# Patient Record
Sex: FEMALE | Race: WHITE | NOT HISPANIC OR LATINO | Employment: FULL TIME | URBAN - METROPOLITAN AREA
[De-identification: names, ages, dates, MRNs, and addresses within clinical notes are randomized per-mention and may not be internally consistent; named-entity substitution may affect disease eponyms.]

---

## 2017-01-01 PROBLEM — E83.39 HYPOPHOSPHATEMIA: Status: ACTIVE | Noted: 2017-01-01

## 2017-01-01 PROBLEM — E83.42 HYPOMAGNESEMIA: Status: ACTIVE | Noted: 2017-01-01

## 2017-01-01 PROBLEM — E44.0 MALNUTRITION OF MODERATE DEGREE: Status: ACTIVE | Noted: 2017-01-01

## 2017-01-03 ENCOUNTER — ANESTHESIA (OUTPATIENT)
Dept: SURGERY | Facility: HOSPITAL | Age: 37
DRG: 329 | End: 2017-01-03
Payer: COMMERCIAL

## 2017-01-03 ENCOUNTER — ANESTHESIA EVENT (OUTPATIENT)
Dept: SURGERY | Facility: HOSPITAL | Age: 37
DRG: 329 | End: 2017-01-03
Payer: COMMERCIAL

## 2017-01-03 PROBLEM — K55.029 ISCHEMIC NECROSIS OF SMALL BOWEL: Status: ACTIVE | Noted: 2017-01-03

## 2017-01-03 PROBLEM — K56.600 PARTIAL SMALL BOWEL OBSTRUCTION: Status: ACTIVE | Noted: 2017-01-03

## 2017-01-03 PROCEDURE — 25000003 PHARM REV CODE 250: Performed by: SURGERY

## 2017-01-03 PROCEDURE — 63600175 PHARM REV CODE 636 W HCPCS: Performed by: SURGERY

## 2017-01-03 PROCEDURE — P9045 ALBUMIN (HUMAN), 5%, 250 ML: HCPCS | Performed by: NURSE ANESTHETIST, CERTIFIED REGISTERED

## 2017-01-03 PROCEDURE — 63600175 PHARM REV CODE 636 W HCPCS: Performed by: NURSE ANESTHETIST, CERTIFIED REGISTERED

## 2017-01-03 PROCEDURE — 25000003 PHARM REV CODE 250: Performed by: NURSE ANESTHETIST, CERTIFIED REGISTERED

## 2017-01-03 RX ORDER — FENTANYL CITRATE 50 UG/ML
INJECTION, SOLUTION INTRAMUSCULAR; INTRAVENOUS
Status: DISCONTINUED | OUTPATIENT
Start: 2017-01-03 | End: 2017-01-03

## 2017-01-03 RX ORDER — NEOSTIGMINE METHYLSULFATE 1 MG/ML
INJECTION, SOLUTION INTRAVENOUS
Status: DISCONTINUED | OUTPATIENT
Start: 2017-01-03 | End: 2017-01-03

## 2017-01-03 RX ORDER — LIDOCAINE HYDROCHLORIDE 10 MG/ML
INJECTION INFILTRATION; PERINEURAL
Status: DISCONTINUED | OUTPATIENT
Start: 2017-01-03 | End: 2017-01-03

## 2017-01-03 RX ORDER — SODIUM CHLORIDE, SODIUM LACTATE, POTASSIUM CHLORIDE, CALCIUM CHLORIDE 600; 310; 30; 20 MG/100ML; MG/100ML; MG/100ML; MG/100ML
INJECTION, SOLUTION INTRAVENOUS CONTINUOUS PRN
Status: DISCONTINUED | OUTPATIENT
Start: 2017-01-03 | End: 2017-01-03

## 2017-01-03 RX ORDER — ROCURONIUM BROMIDE 10 MG/ML
INJECTION, SOLUTION INTRAVENOUS
Status: DISCONTINUED | OUTPATIENT
Start: 2017-01-03 | End: 2017-01-03

## 2017-01-03 RX ORDER — ONDANSETRON 2 MG/ML
INJECTION INTRAMUSCULAR; INTRAVENOUS
Status: DISCONTINUED | OUTPATIENT
Start: 2017-01-03 | End: 2017-01-03

## 2017-01-03 RX ORDER — GLYCOPYRROLATE 0.2 MG/ML
INJECTION INTRAMUSCULAR; INTRAVENOUS
Status: DISCONTINUED | OUTPATIENT
Start: 2017-01-03 | End: 2017-01-03

## 2017-01-03 RX ORDER — MIDAZOLAM HYDROCHLORIDE 1 MG/ML
INJECTION, SOLUTION INTRAMUSCULAR; INTRAVENOUS
Status: DISCONTINUED | OUTPATIENT
Start: 2017-01-03 | End: 2017-01-03

## 2017-01-03 RX ORDER — PROPOFOL 10 MG/ML
VIAL (ML) INTRAVENOUS
Status: DISCONTINUED | OUTPATIENT
Start: 2017-01-03 | End: 2017-01-03

## 2017-01-03 RX ORDER — SUCCINYLCHOLINE CHLORIDE 20 MG/ML
INJECTION INTRAMUSCULAR; INTRAVENOUS
Status: DISCONTINUED | OUTPATIENT
Start: 2017-01-03 | End: 2017-01-03

## 2017-01-03 RX ORDER — ALBUMIN HUMAN 50 G/1000ML
SOLUTION INTRAVENOUS CONTINUOUS PRN
Status: DISCONTINUED | OUTPATIENT
Start: 2017-01-03 | End: 2017-01-03

## 2017-01-03 RX ADMIN — LIDOCAINE HYDROCHLORIDE 80 MG: 10 INJECTION, SOLUTION INFILTRATION; PERINEURAL at 01:01

## 2017-01-03 RX ADMIN — ROCURONIUM BROMIDE 10 MG: 10 INJECTION, SOLUTION INTRAVENOUS at 03:01

## 2017-01-03 RX ADMIN — METRONIDAZOLE 500 MG: 500 SOLUTION INTRAVENOUS at 02:01

## 2017-01-03 RX ADMIN — MIDAZOLAM HYDROCHLORIDE 2 MG: 1 INJECTION, SOLUTION INTRAMUSCULAR; INTRAVENOUS at 01:01

## 2017-01-03 RX ADMIN — ROCURONIUM BROMIDE 45 MG: 10 INJECTION, SOLUTION INTRAVENOUS at 02:01

## 2017-01-03 RX ADMIN — ALBUMIN (HUMAN): 12.5 SOLUTION INTRAVENOUS at 01:01

## 2017-01-03 RX ADMIN — FENTANYL CITRATE 150 MCG: 50 INJECTION, SOLUTION INTRAMUSCULAR; INTRAVENOUS at 01:01

## 2017-01-03 RX ADMIN — ONDANSETRON 4 MG: 2 INJECTION, SOLUTION INTRAMUSCULAR; INTRAVENOUS at 03:01

## 2017-01-03 RX ADMIN — FENTANYL CITRATE 50 MCG: 50 INJECTION, SOLUTION INTRAMUSCULAR; INTRAVENOUS at 02:01

## 2017-01-03 RX ADMIN — PROPOFOL 100 MG: 10 INJECTION, EMULSION INTRAVENOUS at 01:01

## 2017-01-03 RX ADMIN — SUCCINYLCHOLINE CHLORIDE 150 MG: 20 INJECTION, SOLUTION INTRAMUSCULAR; INTRAVENOUS at 01:01

## 2017-01-03 RX ADMIN — GLYCOPYRROLATE 0.6 MG: 0.2 INJECTION, SOLUTION INTRAMUSCULAR; INTRAVENOUS at 03:01

## 2017-01-03 RX ADMIN — CEFTRIAXONE 2 G: 2 INJECTION, SOLUTION INTRAVENOUS at 02:01

## 2017-01-03 RX ADMIN — FENTANYL CITRATE 50 MCG: 50 INJECTION, SOLUTION INTRAMUSCULAR; INTRAVENOUS at 03:01

## 2017-01-03 RX ADMIN — NEOSTIGMINE METHYLSULFATE 4 MG: 1 INJECTION INTRAVENOUS at 03:01

## 2017-01-03 RX ADMIN — Medication 20 MG: at 02:01

## 2017-01-03 RX ADMIN — SODIUM CHLORIDE, SODIUM LACTATE, POTASSIUM CHLORIDE, AND CALCIUM CHLORIDE: 600; 310; 30; 20 INJECTION, SOLUTION INTRAVENOUS at 01:01

## 2017-01-03 RX ADMIN — ROCURONIUM BROMIDE 5 MG: 10 INJECTION, SOLUTION INTRAVENOUS at 01:01

## 2017-01-03 NOTE — TRANSFER OF CARE
"Anesthesia Transfer of Care Note    Patient: Lia Avila    Procedure(s) Performed: Procedure(s):  LAPAROSCOPY-DIAGNOSTIC  [id number]  RESECTION - SMALL BOWEL    Patient location: PACU    Anesthesia Type: general    Transport from OR: Transported from OR on room air with adequate spontaneous ventilation    Post pain: adequate analgesia    Post assessment: no apparent anesthetic complications    Post vital signs: stable    Level of consciousness: awake    Nausea/Vomiting: no nausea/vomiting    Complications: none          Last vitals:   Visit Vitals    BP (!) 123/56    Pulse (!) 138    Temp 36.7 °C (98.1 °F) (Temporal)    Resp 17    Ht 5' 2" (1.575 m)    Wt 62.1 kg (137 lb)    LMP 12/22/2016    SpO2 100%    Breastfeeding No    BMI 25.06 kg/m2     "

## 2017-01-03 NOTE — ANESTHESIA PREPROCEDURE EVALUATION
01/03/2017  Lia Avila is a 36 y.o., female.    [facility] Anesthesia Evaluation    I have reviewed the Patient Summary Reports.    I have reviewed the Nursing Notes.   I have reviewed the Medications.     Review of Systems  Anesthesia Hx:  No problems with previous Anesthesia  History of prior surgery of interest to airway management or planning: Denies Family Hx of Anesthesia complications.   Denies Personal Hx of Anesthesia complications.   Social:  Alcohol Use, Smoker    Hematology/Oncology:     Oncology Normal     EENT/Dental:EENT/Dental Normal   Cardiovascular:   ECG has been reviewed.    Renal/:  Renal/ Normal     Hepatic/GI:   GERD IBS   Musculoskeletal:  Musculoskeletal Normal    Endocrine:  Endocrine Normal    Psych:   Psychiatric History anxiety depression          Physical Exam   Airway/Jaw/Neck:  Airway Findings: Mouth Opening: Normal Tongue: Normal  General Airway Assessment: Adult  Mallampati: II  TM Distance: Normal, at least 6 cm          Chest/Lungs:  Chest/Lungs Findings: Normal Respiratory Rate     Heart/Vascular:  Heart Findings: Rate: Tachycardia             Anesthesia Plan  Type of Anesthesia, risks & benefits discussed:  Anesthesia Type:  general  Patient's Preference:   Intra-op Monitoring Plan:   Intra-op Monitoring Plan Comments:   Post Op Pain Control Plan:   Post Op Pain Control Plan Comments:   Induction:   IV  Beta Blocker:  Patient is not currently on a Beta-Blocker (No further documentation required).       Informed Consent: Patient understands risks and agrees with Anesthesia plan.  Questions answered. Anesthesia consent signed with patient.  ASA Score: 3     Day of Surgery Review of History & Physical:    H&P update referred to the surgeon.         Ready For Surgery From Anesthesia Perspective.

## 2017-01-03 NOTE — ANESTHESIA POSTPROCEDURE EVALUATION
"Anesthesia Post Evaluation    Patient: Lia Avila    Procedure(s) Performed: Procedure(s):  LAPAROSCOPY-DIAGNOSTIC  [id number]  RESECTION - SMALL BOWEL    Final Anesthesia Type: general  Patient location during evaluation: PACU  Patient participation: Yes- Able to Participate  Level of consciousness: awake and alert  Post-procedure vital signs: reviewed and stable  Pain management: adequate  Airway patency: patent  PONV status at discharge: No PONV  Anesthetic complications: no      Cardiovascular status: hemodynamically stable  Respiratory status: spontaneous ventilation  Hydration status: euvolemic  Follow-up not needed.        Visit Vitals    BP (!) 115/52    Pulse (!) 135    Temp 36.7 °C (98.1 °F) (Temporal)    Resp (!) 35    Ht 5' 2" (1.575 m)    Wt 62.1 kg (137 lb)    LMP 12/22/2016    SpO2 95%    Breastfeeding No    BMI 25.06 kg/m2       Pain/Matt Score: Pain Assessment Performed: Yes (1/3/2017 12:00 PM)  Presence of Pain: non-verbal indicators absent (1/3/2017  1:35 PM)  Pain Rating Prior to Med Admin: 7 (1/3/2017  4:05 PM)  Pain Rating Post Med Admin: 0 (eyes closed) (1/3/2017  4:31 AM)      "

## 2017-01-04 PROBLEM — K56.600 PARTIAL SMALL BOWEL OBSTRUCTION: Status: RESOLVED | Noted: 2017-01-03 | Resolved: 2017-01-04

## 2017-01-07 PROBLEM — E83.42 HYPOMAGNESEMIA: Status: RESOLVED | Noted: 2017-01-01 | Resolved: 2017-01-07

## 2017-01-07 PROBLEM — E83.39 HYPOPHOSPHATEMIA: Status: RESOLVED | Noted: 2017-01-01 | Resolved: 2017-01-07

## 2017-01-08 PROBLEM — R19.7 DIARRHEA, UNSPECIFIED: Status: ACTIVE | Noted: 2017-01-08

## 2017-01-09 PROBLEM — K55.029 ISCHEMIC NECROSIS OF SMALL BOWEL: Status: RESOLVED | Noted: 2017-01-03 | Resolved: 2017-01-09

## 2017-01-10 PROBLEM — R10.13 EPIGASTRIC PAIN: Status: ACTIVE | Noted: 2017-01-10

## 2017-01-11 PROBLEM — Z90.49 S/P SMALL BOWEL RESECTION: Status: ACTIVE | Noted: 2017-01-11

## 2017-01-11 PROBLEM — J90 PLEURAL EFFUSION, LEFT: Status: ACTIVE | Noted: 2017-01-11

## 2017-01-15 PROBLEM — E44.0 PROTEIN-CALORIE MALNUTRITION, MODERATE: Status: ACTIVE | Noted: 2017-01-15

## 2017-01-15 PROBLEM — I82.B12 THROMBOSIS OF LEFT SUBCLAVIAN VEIN: Status: ACTIVE | Noted: 2017-01-15

## 2017-01-16 ENCOUNTER — ANESTHESIA (OUTPATIENT)
Dept: SURGERY | Facility: HOSPITAL | Age: 37
DRG: 329 | End: 2017-01-16
Payer: COMMERCIAL

## 2017-01-16 ENCOUNTER — ANESTHESIA EVENT (OUTPATIENT)
Dept: SURGERY | Facility: HOSPITAL | Age: 37
DRG: 329 | End: 2017-01-16
Payer: COMMERCIAL

## 2017-01-16 PROBLEM — E44.0 MALNUTRITION OF MODERATE DEGREE: Chronic | Status: ACTIVE | Noted: 2017-01-01

## 2017-01-16 PROBLEM — K56.600 PARTIAL SMALL BOWEL OBSTRUCTION: Status: ACTIVE | Noted: 2017-01-16

## 2017-01-16 PROCEDURE — 25000003 PHARM REV CODE 250: Performed by: NURSE ANESTHETIST, CERTIFIED REGISTERED

## 2017-01-16 PROCEDURE — 63600175 PHARM REV CODE 636 W HCPCS: Performed by: NURSE ANESTHETIST, CERTIFIED REGISTERED

## 2017-01-16 RX ORDER — MIDAZOLAM HYDROCHLORIDE 1 MG/ML
INJECTION, SOLUTION INTRAMUSCULAR; INTRAVENOUS
Status: DISCONTINUED | OUTPATIENT
Start: 2017-01-16 | End: 2017-01-16

## 2017-01-16 RX ORDER — PROPOFOL 10 MG/ML
VIAL (ML) INTRAVENOUS
Status: DISCONTINUED | OUTPATIENT
Start: 2017-01-16 | End: 2017-01-16

## 2017-01-16 RX ORDER — FENTANYL CITRATE 50 UG/ML
INJECTION, SOLUTION INTRAMUSCULAR; INTRAVENOUS
Status: DISCONTINUED | OUTPATIENT
Start: 2017-01-16 | End: 2017-01-16

## 2017-01-16 RX ORDER — LIDOCAINE HCL/PF 100 MG/5ML
SYRINGE (ML) INTRAVENOUS
Status: DISCONTINUED | OUTPATIENT
Start: 2017-01-16 | End: 2017-01-16

## 2017-01-16 RX ORDER — ONDANSETRON 2 MG/ML
INJECTION INTRAMUSCULAR; INTRAVENOUS
Status: DISCONTINUED | OUTPATIENT
Start: 2017-01-16 | End: 2017-01-16

## 2017-01-16 RX ORDER — SODIUM CHLORIDE, SODIUM LACTATE, POTASSIUM CHLORIDE, CALCIUM CHLORIDE 600; 310; 30; 20 MG/100ML; MG/100ML; MG/100ML; MG/100ML
INJECTION, SOLUTION INTRAVENOUS CONTINUOUS PRN
Status: DISCONTINUED | OUTPATIENT
Start: 2017-01-16 | End: 2017-01-16

## 2017-01-16 RX ADMIN — PROPOFOL 50 MG: 10 INJECTION, EMULSION INTRAVENOUS at 11:01

## 2017-01-16 RX ADMIN — SODIUM CHLORIDE, SODIUM LACTATE, POTASSIUM CHLORIDE, AND CALCIUM CHLORIDE: 600; 310; 30; 20 INJECTION, SOLUTION INTRAVENOUS at 11:01

## 2017-01-16 RX ADMIN — FENTANYL CITRATE 50 MCG: 50 INJECTION, SOLUTION INTRAMUSCULAR; INTRAVENOUS at 11:01

## 2017-01-16 RX ADMIN — PROPOFOL 30 MG: 10 INJECTION, EMULSION INTRAVENOUS at 11:01

## 2017-01-16 RX ADMIN — PROPOFOL 30 MG: 10 INJECTION, EMULSION INTRAVENOUS at 12:01

## 2017-01-16 RX ADMIN — MIDAZOLAM HYDROCHLORIDE 1 MG: 1 INJECTION, SOLUTION INTRAMUSCULAR; INTRAVENOUS at 11:01

## 2017-01-16 RX ADMIN — LIDOCAINE HYDROCHLORIDE 60 MG: 20 INJECTION, SOLUTION INTRAVENOUS at 11:01

## 2017-01-16 RX ADMIN — ONDANSETRON 4 MG: 2 INJECTION, SOLUTION INTRAMUSCULAR; INTRAVENOUS at 11:01

## 2017-01-16 NOTE — ANESTHESIA RELEASE NOTE
"Anesthesia Release from PACU Note    Patient: Lia Avila    Procedure(s) Performed: Procedure(s) (LRB):  CHEST TUBE PLACEMENT (Left)    Anesthesia type: MAC    Post pain: Adequate analgesia    Post assessment: no apparent anesthetic complications, tolerated procedure well and no evidence of recall    Last Vitals:   Visit Vitals    /67 (BP Location: Right arm, Patient Position: Lying, BP Method: Automatic)    Pulse (!) 111    Temp 37.3 °C (99.2 °F) (Oral)    Resp (!) 22    Ht 5' 2" (1.575 m)    Wt 62.1 kg (137 lb)    LMP 12/22/2016    SpO2 (!) 87%    Breastfeeding No    BMI 25.06 kg/m2       Post vital signs: stable    Level of consciousness: awake and alert     Nausea/Vomiting: no nausea/no vomiting    Complications: none    Airway Patency: patent    Respiratory: unassisted, nasal cannula    Cardiovascular: stable and blood pressure at baseline    Hydration: euvolemic  "

## 2017-01-16 NOTE — TRANSFER OF CARE
"Anesthesia Transfer of Care Note    Patient: Lia Avila    Procedure(s) Performed: Procedure(s) (LRB):  CHEST TUBE PLACEMENT (Left)    Patient location: PACU    Anesthesia Type: MAC    Transport from OR: Transported from OR on 2-3 L/min O2 by NC with adequate spontaneous ventilation    Post pain: adequate analgesia    Post assessment: no apparent anesthetic complications    Post vital signs: stable    Level of consciousness: awake and alert    Nausea/Vomiting: no nausea/vomiting    Complications: none          Last vitals:   Visit Vitals    /67 (BP Location: Right arm, Patient Position: Lying, BP Method: Automatic)    Pulse (!) 111    Temp 37.3 °C (99.2 °F) (Oral)    Resp (!) 22    Ht 5' 2" (1.575 m)    Wt 62.1 kg (137 lb)    LMP 12/22/2016    SpO2 (!) 87%    Breastfeeding No    BMI 25.06 kg/m2     "

## 2017-01-16 NOTE — ANESTHESIA PREPROCEDURE EVALUATION
01/16/2017  Lia Avila is a 36 y.o., female.    [facility] An[facility]hesia Evaluation    I have reviewed the Patient Summary Reports.    I have reviewed the Nursing Notes.   I have reviewed the Medications.     Review of Systems  Anesthesia Hx:  No problems with previous Anesthesia  Denies Family Hx of Anesthesia complications.   Denies Personal Hx of Anesthesia complications.   Social:  Smoker    Hematology/Oncology:     Oncology Normal    -- Anemia:   EENT/Dental:EENT/Dental Normal   Cardiovascular:   ECG has been reviewed.    Pulmonary:   Pleural effusion, left   O2 3lpm sat 87%   Renal/:  Renal/ Normal     Hepatic/GI:   GERD    Musculoskeletal:  Musculoskeletal Normal    Endocrine:  Endocrine Normal    Dermatological:  Skin Normal    Psych:   Psychiatric History anxiety          Physical Exam  General:  Well nourished    Airway/Jaw/Neck:  Airway Findings: Mouth Opening: Normal Tongue: Normal  General Airway Assessment: Adult, Average  Mallampati: II  TM Distance: Normal, at least 6 cm      Dental:  Dental Findings: In tact   Chest/Lungs:  Chest/Lungs Findings: Normal Respiratory Rate     Heart/Vascular:  Heart Findings: Rate: Tachycardia  Rhythm: Regular Rhythm  Sounds: Normal        Mental Status:  Mental Status Findings:  Cooperative, Alert and Oriented         Anesthesia Plan  Type of Anesthesia, risks & benefits discussed:  Anesthesia Type:  MAC, general  Patient's Preference:   Intra-op Monitoring Plan:   Intra-op Monitoring Plan Comments:   Post Op Pain Control Plan:   Post Op Pain Control Plan Comments:   Induction:   IV  Beta Blocker:  Patient is not currently on a Beta-Blocker (No further documentation required).       Informed Consent: Patient understands risks and agrees with Anesthesia plan.  Questions answered. Anesthesia consent signed with patient.  ASA Score: 3     Day of Surgery  Review of History & Physical: I have interviewed and examined the patient. I have reviewed the patient's H&P dated: 1/16/17. There are no significant changes.  H&P update referred to the surgeon.         Ready For Surgery From Anesthesia Perspective.

## 2017-01-17 PROBLEM — J90 PLEURAL EFFUSION, RIGHT: Status: ACTIVE | Noted: 2017-01-17

## 2017-01-17 PROBLEM — D64.9 ANEMIA: Status: ACTIVE | Noted: 2017-01-17

## 2017-01-17 PROBLEM — E87.8 ELECTROLYTE IMBALANCE: Status: ACTIVE | Noted: 2017-01-17

## 2017-01-18 PROBLEM — K56.600 PARTIAL SMALL BOWEL OBSTRUCTION: Status: RESOLVED | Noted: 2017-01-16 | Resolved: 2017-01-18

## 2017-01-18 PROBLEM — R07.81 PLEURITIC CHEST PAIN: Status: ACTIVE | Noted: 2017-01-11

## 2017-01-18 PROBLEM — J18.9 PARAPNEUMONIC EFFUSION: Status: ACTIVE | Noted: 2017-01-17

## 2017-01-18 PROBLEM — N73.9 PELVIC ABSCESS IN FEMALE: Status: ACTIVE | Noted: 2017-01-18

## 2017-01-18 PROBLEM — J91.8 PARAPNEUMONIC EFFUSION: Status: ACTIVE | Noted: 2017-01-17

## 2017-01-21 NOTE — ANESTHESIA POSTPROCEDURE EVALUATION
"Anesthesia Post Evaluation    Patient: Lia Avila    Procedure(s) Performed: Procedure(s) (LRB):  CHEST TUBE PLACEMENT (Left)    Final Anesthesia Type: MAC  Patient location during evaluation: PACU  Patient participation: Yes- Able to Participate  Level of consciousness: awake and alert and oriented  Post-procedure vital signs: reviewed and stable  Pain management: adequate  Airway patency: patent  PONV status at discharge: No PONV  Anesthetic complications: no      Cardiovascular status: hemodynamically stable  Respiratory status: unassisted, nasal cannula and spontaneous ventilation  Hydration status: euvolemic  Follow-up not needed.        Visit Vitals    /77 (BP Location: Right arm, Patient Position: Lying, BP Method: Automatic)    Pulse 97    Temp 37.3 °C (99.2 °F) (Oral)    Resp 20    Ht 5' 2" (1.575 m)    Wt 62.6 kg (138 lb)    LMP 12/22/2016    SpO2 97%    Breastfeeding No    BMI 25.24 kg/m2       Pain/Matt Score: Pain Assessment Performed: Yes (1/21/2017 11:05 AM)  Presence of Pain: complains of pain/discomfort (1/21/2017 11:05 AM)  Pain Rating Prior to Med Admin: 2 (1/21/2017 11:09 AM)  Pain Rating Post Med Admin: 2 (1/21/2017  8:27 AM)      "

## 2017-01-22 PROBLEM — R19.7 DIARRHEA, UNSPECIFIED: Status: RESOLVED | Noted: 2017-01-08 | Resolved: 2017-01-22

## 2017-01-23 ENCOUNTER — TELEPHONE (OUTPATIENT)
Dept: PULMONOLOGY | Facility: CLINIC | Age: 37
End: 2017-01-23

## 2017-01-23 NOTE — TELEPHONE ENCOUNTER
----- Message from Thony Ivy MD sent at 1/23/2017  1:37 PM CST -----  Please let her know   NO Malignant cells    FINAL PATHOLOGIC DIAGNOSIS  Pleural fluid:  Sparsely cellular fluid;  Negative for malignant cells.  [facility]  Diagnosed by: Sam Novoa M.D.  (Electronically Signed: 2017-01-23 10:01:47)

## 2017-02-01 ENCOUNTER — NURSE TRIAGE (OUTPATIENT)
Dept: ADMINISTRATIVE | Facility: CLINIC | Age: 37
End: 2017-02-01

## 2017-02-01 ENCOUNTER — HOSPITAL ENCOUNTER (EMERGENCY)
Facility: HOSPITAL | Age: 37
Discharge: HOME | End: 2017-02-01
Attending: EMERGENCY MEDICINE
Payer: COMMERCIAL

## 2017-02-01 VITALS
DIASTOLIC BLOOD PRESSURE: 70 MMHG | OXYGEN SATURATION: 97 % | RESPIRATION RATE: 28 BRPM | SYSTOLIC BLOOD PRESSURE: 120 MMHG | HEIGHT: 62 IN | WEIGHT: 138 LBS | HEART RATE: 100 BPM | TEMPERATURE: 99 F | BODY MASS INDEX: 25.4 KG/M2

## 2017-02-01 DIAGNOSIS — D64.9 ANEMIA, UNSPECIFIED TYPE: Primary | ICD-10-CM

## 2017-02-01 DIAGNOSIS — J90 PLEURAL EFFUSION ON RIGHT: ICD-10-CM

## 2017-02-01 DIAGNOSIS — N83.201 RIGHT OVARIAN CYST: ICD-10-CM

## 2017-02-01 DIAGNOSIS — R03.0 ELEVATED BLOOD PRESSURE READING: ICD-10-CM

## 2017-02-01 DIAGNOSIS — R07.89 CHEST PRESSURE: ICD-10-CM

## 2017-02-01 LAB
ALBUMIN SERPL BCP-MCNC: 3.6 G/DL
ALP SERPL-CCNC: 78 U/L
ALT SERPL W/O P-5'-P-CCNC: 16 U/L
ANION GAP SERPL CALC-SCNC: 13 MMOL/L
AST SERPL-CCNC: 14 U/L
B-HCG UR QL: NEGATIVE
BASOPHILS # BLD AUTO: 0.05 K/UL
BASOPHILS NFR BLD: 0.6 %
BILIRUB SERPL-MCNC: 0.3 MG/DL
BILIRUB UR QL STRIP: NEGATIVE
BUN SERPL-MCNC: 8 MG/DL
CALCIUM SERPL-MCNC: 9.6 MG/DL
CHLORIDE SERPL-SCNC: 107 MMOL/L
CLARITY UR: CLEAR
CO2 SERPL-SCNC: 21 MMOL/L
COLOR UR: YELLOW
CREAT SERPL-MCNC: 0.7 MG/DL
DIFFERENTIAL METHOD: ABNORMAL
EOSINOPHIL # BLD AUTO: 0.4 K/UL
EOSINOPHIL NFR BLD: 4.5 %
ERYTHROCYTE [DISTWIDTH] IN BLOOD BY AUTOMATED COUNT: 16.2 %
EST. GFR  (AFRICAN AMERICAN): >60 ML/MIN/1.73 M^2
EST. GFR  (NON AFRICAN AMERICAN): >60 ML/MIN/1.73 M^2
GLUCOSE SERPL-MCNC: 95 MG/DL
GLUCOSE UR QL STRIP: NEGATIVE
HCT VFR BLD AUTO: 34 %
HGB BLD-MCNC: 10.7 G/DL
HGB UR QL STRIP: NEGATIVE
KETONES UR QL STRIP: NEGATIVE
LEUKOCYTE ESTERASE UR QL STRIP: NEGATIVE
LIPASE SERPL-CCNC: 35 U/L
LYMPHOCYTES # BLD AUTO: 2.1 K/UL
LYMPHOCYTES NFR BLD: 23.3 %
MCH RBC QN AUTO: 26.8 PG
MCHC RBC AUTO-ENTMCNC: 31.5 %
MCV RBC AUTO: 85 FL
MONOCYTES # BLD AUTO: 0.5 K/UL
MONOCYTES NFR BLD: 5.2 %
NEUTROPHILS # BLD AUTO: 6 K/UL
NEUTROPHILS NFR BLD: 66.4 %
NITRITE UR QL STRIP: NEGATIVE
PH UR STRIP: 7 [PH] (ref 5–8)
PLATELET # BLD AUTO: 404 K/UL
PMV BLD AUTO: 10.2 FL
POTASSIUM SERPL-SCNC: 3.9 MMOL/L
PROT SERPL-MCNC: 7.4 G/DL
PROT UR QL STRIP: NEGATIVE
RBC # BLD AUTO: 4 M/UL
SODIUM SERPL-SCNC: 141 MMOL/L
SP GR UR STRIP: 1.01 (ref 1–1.03)
TROPONIN I SERPL DL<=0.01 NG/ML-MCNC: <0.006 NG/ML
URN SPEC COLLECT METH UR: NORMAL
UROBILINOGEN UR STRIP-ACNC: NEGATIVE EU/DL
WBC # BLD AUTO: 9.04 K/UL

## 2017-02-01 PROCEDURE — 25000003 PHARM REV CODE 250: Performed by: EMERGENCY MEDICINE

## 2017-02-01 PROCEDURE — 84484 ASSAY OF TROPONIN QUANT: CPT

## 2017-02-01 PROCEDURE — 80053 COMPREHEN METABOLIC PANEL: CPT

## 2017-02-01 PROCEDURE — 83690 ASSAY OF LIPASE: CPT

## 2017-02-01 PROCEDURE — 81025 URINE PREGNANCY TEST: CPT

## 2017-02-01 PROCEDURE — 99284 EMERGENCY DEPT VISIT MOD MDM: CPT | Mod: 25

## 2017-02-01 PROCEDURE — 63600175 PHARM REV CODE 636 W HCPCS: Performed by: EMERGENCY MEDICINE

## 2017-02-01 PROCEDURE — 93010 ELECTROCARDIOGRAM REPORT: CPT | Mod: ,,, | Performed by: INTERNAL MEDICINE

## 2017-02-01 PROCEDURE — 85025 COMPLETE CBC W/AUTO DIFF WBC: CPT

## 2017-02-01 PROCEDURE — 96374 THER/PROPH/DIAG INJ IV PUSH: CPT

## 2017-02-01 PROCEDURE — 25500020 PHARM REV CODE 255: Performed by: EMERGENCY MEDICINE

## 2017-02-01 PROCEDURE — 81003 URINALYSIS AUTO W/O SCOPE: CPT

## 2017-02-01 RX ORDER — OXYCODONE AND ACETAMINOPHEN 10; 325 MG/1; MG/1
1 TABLET ORAL
Status: COMPLETED | OUTPATIENT
Start: 2017-02-01 | End: 2017-02-01

## 2017-02-01 RX ORDER — DULOXETIN HYDROCHLORIDE 60 MG/1
120 CAPSULE, DELAYED RELEASE ORAL DAILY
COMMUNITY
End: 2019-07-11

## 2017-02-01 RX ORDER — ONDANSETRON 2 MG/ML
4 INJECTION INTRAMUSCULAR; INTRAVENOUS ONCE
Status: COMPLETED | OUTPATIENT
Start: 2017-02-01 | End: 2017-02-01

## 2017-02-01 RX ADMIN — OXYCODONE HYDROCHLORIDE AND ACETAMINOPHEN 1 TABLET: 10; 325 TABLET ORAL at 08:02

## 2017-02-01 RX ADMIN — IOHEXOL 100 ML: 300 INJECTION, SOLUTION INTRAVENOUS at 06:02

## 2017-02-01 RX ADMIN — ONDANSETRON 4 MG: 2 INJECTION INTRAMUSCULAR; INTRAVENOUS at 04:02

## 2017-02-01 RX ADMIN — IOHEXOL 30 ML: 350 INJECTION, SOLUTION INTRAVENOUS at 04:02

## 2017-02-01 NOTE — ED AVS SNAPSHOT
[facility] - [address] [address]               Lia Avila   2017  2:54 PM   ED    Description:  Female : 1980   Department:  [facility]           Your Care was Coordinated By:     Provider Role From To    Kathi Gorman DO Attending Provider 17 1456 --      Reason for Visit     Chest Pain           Diagnoses this Visit        Comments    Anemia, unspecified type    -  Primary     Chest pressure         Pleural effusion on right         Right ovarian cyst           ED Disposition     None           To Do List           Follow-up Information     Follow up with Thony Ivy MD. Schedule an appointment as soon as possible for a visit in 2 days.    Specialty:  Pulmonary Disease    Why:  Return to the ED for:   Worsening shortness of breath, difficulty breathing, fever, worsening chest pain, or other concerns.    Contact information:    [address]  [facility] [address]  [phone]          Follow up with Louis O. Jeansonne, MD. Schedule an appointment as soon as possible for a visit in 2 days.    Specialty:  General Surgery    Why:  Return to the ED for:   vomiting, abdominal pain, fever, unable to pass gas, weakness, or other concerns.     Contact information:    [address] [place] [address] [address] Jessica CRUZ [address]  [phone]          Follow up with [facility]. Schedule an appointment as soon as possible for a visit in 2 days.    Specialty:  Obstetrics and Gynecology    Why:  Return to the ED for lower pelvic pain and vomiting.    Contact information:    [address] [place] [id number]  [phone]      [facility]     [facility] -  Assistance  Registered nurses in the [facility] provide clinical advisement, health education, appointment booking, and other advisory services.  Call for this free service at [phone].              Medications           Message regarding Medications     Verify the changes and/or additions to your medication regime listed below are the same as discussed with your clinician today.  If any of these changes or additions are incorrect, please notify your healthcare provider.        These medications were administered today        Dose Freq    omnipaque 350 iohexol 30 mL 30 mL IMG once as needed    Sig: Take 30 mLs by mouth ONCE PRN for contrast.    Class: Normal    Route: Oral    ondansetron injection 4 mg 4 mg Once    Sig: Inject 4 mg into the vein once.    Class: Normal    Route: Intravenous    omnipaque 300 iohexol 100 mL 100 mL IMG once as needed    Sig: Inject 100 mLs into the vein ONCE PRN for contrast.    Class: Normal    Route: Intravenous    oxycodone-acetaminophen  mg per tablet 1 tablet 1 tablet ED 1 Time    Sig: Take 1 tablet by mouth ED 1 Time.    Class: Normal    Route: Oral           Verify that the below list of medications is an accurate representation of the medications you are currently taking.  If none reported, the list may be blank. If incorrect, please contact your healthcare provider. Carry this list with you in case of emergency.           Current Medications     amoxicillin-clavulanate 875-125mg (AUGMENTIN) 875-125 mg per tablet Take 1 tablet by mouth 2 (two) times daily.    apixaban 5 mg Tab Take 1 tablet (5 mg total) by mouth 2 (two) times daily.    clindamycin (CLEOCIN) 300 MG capsule Take 1 capsule (300 mg total) by mouth 3 (three) times daily.    DEXTROAMPHETAMINE/AMPHETAMINE (ADDERALL ORAL) Take 20 mg by mouth 2 (two) times daily.     duloxetine (CYMBALTA) 60 MG capsule Take 90 mg by mouth once daily.    lorazepam (ATIVAN) 0.5 MG tablet Take 0.5 mg by mouth every 6 (six) hours as needed.    multivitamin (THERAGRAN) tablet Take 1 tablet by mouth once daily.    omeprazole (PRILOSEC) 20 MG capsule Take 20 mg by mouth once daily.    ondansetron (ZOFRAN) 4 MG tablet Take 8 mg by  "mouth every 8 (eight) hours.    promethazine (PHENERGAN) 25 MG tablet Take 1 tablet (25 mg total) by mouth every 6 (six) hours as needed for Nausea.    quetiapine (SEROQUEL) 100 MG Tab Take 300 mg by mouth every evening.     trazodone (DESYREL) 150 MG tablet Take 150 mg by mouth every evening.    imipramine (TOFRANIL) 50 MG tablet Take 100 mg by mouth every evening.     oxycodone (ROXICODONE) 10 mg Tab immediate release tablet Take 1 tablet (10 mg total) by mouth every 6 (six) hours as needed for Pain.           Clinical Reference Information           Your Vitals Were     BP Pulse Temp Resp Height Weight    122/66 92 98.5 °F (36.9 °C) (Oral) 20 5' 2" (1.575 m) 62.6 kg (138 lb)    Last Period SpO2 BMI          12/22/2016 (Approximate) 97% 25.24 kg/m2        Allergies as of 2/1/2017        Reactions    Sulfa (Sulfonamide Antibiotics)       Immunizations Administered on Date of Encounter - 2/1/2017     None      ED Micro, Lab, POCT     Start Ordered       Status Ordering Provider    02/01/17 1627 02/01/17 1626  Troponin I  Add-on      Completed     02/01/17 1529 02/01/17 1528  CBC auto differential  STAT      Final result     02/01/17 1529 02/01/17 1528  Comprehensive metabolic panel  STAT      Final result     02/01/17 1529 02/01/17 1528  Lipase  STAT      Final result     02/01/17 1529 02/01/17 1528  Pregnancy, urine rapid  STAT      Final result     02/01/17 1529 02/01/17 1528  Urinalysis  STAT      Final result     02/01/17 1529 02/01/17 1529  Troponin I  Once      Final result       ED Imaging Orders     Start Ordered       Status Ordering Provider    02/01/17 1548 02/01/17 1553  CT Abdomen Pelvis With Contrast  1 time imaging      Final result     02/01/17 1547 02/01/17 1553  CTA Chest Non-Coronary (PE Study)  1 time imaging      Final result     02/01/17 1529 02/01/17 1528  X-Ray Chest PA And Lateral  1 time imaging      Final result     02/01/17 1529 02/01/17 1528  X-Ray Abdomen Flat And Erect  1 time imaging  "     Final result         Discharge Instructions       Your blood pressure was elevated in the ED.  You may have high blood pressure.   Keep a log of your blood pressure and follow up with a Primary Care Provider within the next two weeks. Call [phone] for appointment.       Discharge References/Attachments     ANEMIA (ENGLISH)    OVARIAN CYST (ENGLISH)    PLEURAL EFFUSION (ENGLISH)    CHEST PAIN, UNCERTAIN CAUSE (ENGLISH)    ABDOMINAL PAIN, ADULT (ENGLISH)    HYPERTENSION, TO BE CONFIRMED (ENGLISH)      Your Scheduled Appointments     Feb 16, 2017 10:40 AM CST   Established Patient Visit with Louis O. Jeansonne IV, MD [facility]    [address] [place] [id number] [phone]            Feb 22, 2017  9:00 AM CST   Established Patient Visit with MD KRISTA [facility] ([facility])    [address] [place] [id number] [phone]              [facility] Sign-Up     Activating your [facility] account is as easy as 1-2-3!     1) Visit [email or web address], select Sign Up Now, enter this activation code and your date of birth, then select Next.  [email or web address]  Expires: 3/8/2017  1:56 PM      2) Create a username and password to use when you visit [facility] in the future and select a security question in case you lose your password and select Next.    3) Enter your e-mail address and click Sign Up!    Additional Information  If you have questions, please e-mail [email or web address] or call [phone] to talk to our [facility] staff. Remember, [facility] is NOT to be used for urgent needs. For medical emergencies, dial 911.         Smoking Cessation     If you would like to quit smoking:   You may be eligible for free services if you are a [place] resident and started smoking cigarettes before September 1, 1988.  Call the Smoking Cessation Trust (SCT) toll free at (986) 508-9537 or (863) 261-7999.   Call 4-264-XWNS-NOW  if you do not meet the above criteria.             [facility] complies with applicable Federal civil rights laws and does not discriminate on the basis of race, color, national origin, age, disability, or sex.        Language Assistance Services     ATTENTION: Language assistance services are available, free of charge. Please call [phone].      ATENCIÓN: Si habla español, tiene a joseph disposición servicios gratuitos de asistencia lingüística. Llame al [phone].     CHÚ Ý: N?u b?n nói Ti?ng Vi?t, có các d?ch v? h? tr? ngôn ng? mi?n phí dành cho b?n. G?i s? [phone].

## 2017-02-01 NOTE — ED PROVIDER NOTES
"SCRIBE #1 NOTE: I, Star Stovall, am scribing for, and in the presence of, Kathi Gorman DO. I have scribed the entire note.      History      Chief Complaint   Patient presents with    Chest Pain     recent bowel resection, saturday began having chest pressure, no relief       Review of patient's allergies indicates:   Allergen Reactions    Sulfa (sulfonamide antibiotics)         HPI   HPI    2/1/2017, 3:01 PM   History obtained from the patient      History of Present Illness: Lia Avila is a 36 y.o. female patient who presents to the Emergency Department for chest pressure which onset gradually 4 days ago. Pt states sx located to "everything above the belly button". Sx are constant and moderate in severity. Sx worse when lying flat or with deep inspiration. Sx alleviated while sitting upright. Associated sx include nausea, abd bloating/distension and discomfort. Although, she is not currently nauseated. Pt denies any SOB, diaphoresis, palpitations, extremity weakness/numbness, leg pain/swelling, HA, dizziness, cough, constipation, vomiting, diarrhea, fever, dysuria, hematuria, and all other sx at this time. Pt states she was admitted here Dec 28 2017 for bowel resection and stayed until Jan 22 after the stay being prolonged to treat bilateral pleural effusion, blood clot to neck and left subclavian. She states she saw her PCP 2 days ago and was dx with constipation, but states she has been having normal bowel movements, and had a BM today. Pt states she was on reglan 5 years ago, unsure why she was taken off of it. Normal gastric emptying study about 3 years ago. No further complaints or concerns at this time.          Arrival mode: Personal vehicle      PCP: Akanksha West MD       Past Medical History:  Past Medical History   Diagnosis Date    Acid reflux     Aerophagia     Alcoholic      recovering    Anxiety     Depression     Functional gastrointestinal disorder     Irritable bowel " syndrome        Past Surgical History:  Past Surgical History   Procedure Laterality Date    Cholecystectomy      Gastrostomy-jejeunostomy tube change/placement           Family History:  History reviewed. No pertinent family history.    Social History:  Social History     Social History Main Topics    Smoking status: Current Every Day Smoker     Packs/day: 0.25    Smokeless tobacco: Not on file    Alcohol use No      Comment: pt states that she is a recoveirng alcoholic, last drink 7-2-11    Drug use: No    Sexual activity: Not on file       ROS   Review of Systems   Constitutional: Negative for chills, diaphoresis and fever.   HENT: Negative for sore throat.    Respiratory: Negative for cough and shortness of breath.    Cardiovascular: Positive for chest pain. Negative for palpitations and leg swelling.   Gastrointestinal: Positive for abdominal distention and abdominal pain. Negative for blood in stool, constipation, diarrhea, nausea and vomiting.   Genitourinary: Negative for difficulty urinating, dysuria and hematuria.   Musculoskeletal: Negative for back pain.   Skin: Negative for rash.   Neurological: Negative for dizziness, weakness, numbness and headaches.   Hematological: Does not bruise/bleed easily.   All other systems reviewed and are negative.      Physical Exam    Initial Vitals   BP Pulse Resp Temp SpO2   02/01/17 1452 02/01/17 1452 02/01/17 1452 02/01/17 1452 02/01/17 1452   134/59 106 16 98.2 °F (36.8 °C) 96 %      Physical Exam  Nursing Notes and Vital Signs Reviewed.  Constitutional: Patient is in no acute distress. Awake and alert. Well-developed and well-nourished.  Head: Atraumatic. Normocephalic.  Eyes: PERRL. EOM intact. Conjunctivae are not pale. No scleral icterus.  ENT: Mucous membranes are moist. Oropharynx is clear and symmetric.    Neck: Supple. Full ROM. No lymphadenopathy.  Cardiovascular: Mildly tachycardic. Regular rhythm. No murmurs, rubs, or gallops. Distal pulses are 2+  "and symmetric.  Pulmonary/Chest: No respiratory distress. Clear to auscultation bilaterally. No wheezing, rales, or rhonchi.  Abdominal: Soft and non-distended.  There is no tenderness.  No rebound, guarding, or rigidity.  Good bowel sounds.  Peg tube in place.  Genitourinary: No CVA tenderness  Musculoskeletal: Moves all extremities. No obvious deformities. No edema. No calf tenderness.   LUE: has no evident deformity. negative for swelling. negative for tenderness. ROM is normal. Cap refill distally is <2 seconds. Radial pulses are equal and 2+ bilaterally. No motor or sensory deficit.   Skin: Warm and dry.  Neurological:  Alert, awake, and appropriate.  Normal speech.  No acute focal neurological deficits are appreciated.  Psychiatric: Normal affect. Good eye contact. Appropriate in content.    ED Course    Procedures  ED Vital Signs:  Vitals:    02/01/17 1452 02/01/17 1616 02/01/17 1716 02/01/17 1850   BP: (!) 134/59 132/76 124/71 (!) 119/46   Pulse: 106 99 89 101   Resp: 16 (!) 24 18 (!) 22   Temp: 98.2 °F (36.8 °C)   98.5 °F (36.9 °C)   TempSrc: Oral   Oral   SpO2: 96% 99% 99% 100%   Weight: 62.6 kg (138 lb)      Height: 5' 2" (1.575 m)       02/01/17 1901 02/01/17 1916 02/01/17 1931 02/01/17 1935   BP: 118/79 112/68 118/86    Pulse: 92 95 100 105   Resp: (!) 23 (!) 22 (!) 25 19   Temp:       TempSrc:       SpO2: 97% 97% 96% 97%   Weight:       Height:        02/01/17 2001 02/01/17 2031   BP: 122/66 120/70   Pulse: 92 100   Resp: 20 (!) 28   Temp:     TempSrc:     SpO2: 97% 97%   Weight:     Height:         Abnormal Lab Results:  Labs Reviewed   CBC W/ AUTO DIFFERENTIAL - Abnormal; Notable for the following:        Result Value    Hemoglobin 10.7 (*)     Hematocrit 34.0 (*)     MCH 26.8 (*)     MCHC 31.5 (*)     RDW 16.2 (*)     Platelets 404 (*)     All other components within normal limits   COMPREHENSIVE METABOLIC PANEL - Abnormal; Notable for the following:     CO2 21 (*)     All other components within " normal limits   LIPASE   PREGNANCY TEST, URINE RAPID   URINALYSIS   TROPONIN I   TROPONIN I        All Lab Results:  Results for orders placed or performed during the hospital encounter of 02/01/17   CBC auto differential   Result Value Ref Range    WBC 9.04 3.90 - 12.70 K/uL    RBC 4.00 4.00 - 5.40 M/uL    Hemoglobin 10.7 (L) 12.0 - 16.0 g/dL    Hematocrit 34.0 (L) 37.0 - 48.5 %    MCV 85 82 - 98 fL    MCH 26.8 (L) 27.0 - 31.0 pg    MCHC 31.5 (L) 32.0 - 36.0 %    RDW 16.2 (H) 11.5 - 14.5 %    Platelets 404 (H) 150 - 350 K/uL    MPV 10.2 9.2 - 12.9 fL    Gran # 6.0 1.8 - 7.7 K/uL    Lymph # 2.1 1.0 - 4.8 K/uL    Mono # 0.5 0.3 - 1.0 K/uL    Eos # 0.4 0.0 - 0.5 K/uL    Baso # 0.05 0.00 - 0.20 K/uL    Gran% 66.4 38.0 - 73.0 %    Lymph% 23.3 18.0 - 48.0 %    Mono% 5.2 4.0 - 15.0 %    Eosinophil% 4.5 0.0 - 8.0 %    Basophil% 0.6 0.0 - 1.9 %    Differential Method Automated    Comprehensive metabolic panel   Result Value Ref Range    Sodium 141 136 - 145 mmol/L    Potassium 3.9 3.5 - 5.1 mmol/L    Chloride 107 95 - 110 mmol/L    CO2 21 (L) 23 - 29 mmol/L    Glucose 95 70 - 110 mg/dL    BUN, Bld 8 6 - 20 mg/dL    Creatinine 0.7 0.5 - 1.4 mg/dL    Calcium 9.6 8.7 - 10.5 mg/dL    Total Protein 7.4 6.0 - 8.4 g/dL    Albumin 3.6 3.5 - 5.2 g/dL    Total Bilirubin 0.3 0.1 - 1.0 mg/dL    Alkaline Phosphatase 78 55 - 135 U/L    AST 14 10 - 40 U/L    ALT 16 10 - 44 U/L    Anion Gap 13 8 - 16 mmol/L    eGFR if African American >60 >60 mL/min/1.73 m^2    eGFR if non African American >60 >60 mL/min/1.73 m^2   Lipase   Result Value Ref Range    Lipase 35 4 - 60 U/L   Pregnancy, urine rapid   Result Value Ref Range    Preg Test, Ur Negative    Urinalysis   Result Value Ref Range    Specimen UA Urine, Clean Catch     Color, UA Yellow Yellow, Straw, [place]    Appearance, UA Clear Clear    pH, UA 7.0 5.0 - 8.0    Specific Gravity, UA 1.010 1.005 - 1.030    Protein, UA Negative Negative    Glucose, UA Negative Negative    Ketones, UA  Negative Negative    Bilirubin (UA) Negative Negative    Occult Blood UA Negative Negative    Nitrite, UA Negative Negative    Urobilinogen, UA Negative <2.0 EU/dL    Leukocytes, UA Negative Negative   Troponin I   Result Value Ref Range    Troponin I <0.006 0.000 - 0.026 ng/mL     Results for MELISSA PALOMINO (MRN [id number]) as of 2/1/2017 16:35   Ref. Range 1/21/2017 06:23 1/22/2017 08:20 1/22/2017 10:20 2/1/2017 15:50   Hemoglobin Latest Ref Range: 12.0 - 16.0 g/dL 8.9 (L)   10.7 (L)   Hematocrit Latest Ref Range: 37.0 - 48.5 % 28.3 (L)   34.0 (L)       Imaging Results:  Imaging Results         CT Abdomen Pelvis With Contrast (Final result) Result time:  02/01/17 18:37:59    Final result by Flako Cordova MD (02/01/17 18:37:59)    Impression:             1.  Right ovarian cyst measuring 3.3 x 2.6 cm.  2.  Interval decrease in size of the small anterior pelvic fluid collection measuring 2.4 x 0.6 m on today's exam.  Interval decrease in size of the small pelvic cul-de-sac fluid collection measuring 1.4 x 1.0 cm on today's exam.  3.  No bowel obstruction.    All CT scans at this facility use dose modulation, iterative reconstruction and/or weight based dosing when appropriate to reduce radiation dose to as low as reasonably achievable.        Electronically signed by: FLAKO CORDOVA MD  Date:     02/01/17  Time:    18:37     Narrative:    Exam: CT scan of the abdomen and pelvis with contrast    Technique: Axial CT imaging was performed through the abdomen and pelvis with  75cc  of intravenous contrast. Multiplanar reformats were performed and interpreted.    Clinical History: recent bowel recent, had fluid collection RLQ.   Abdominal pain     Comparison: CT abdomen and pelvis, 01/17/2017    Findings:         There is a percutaneous gastrostomy tube in place.     The liver, spleen, kidneys, adrenal glands, and pancreas are normal.   Cholecystectomy clips are noted.  There is no bowel obstruction.     There is no  free fluid or free air or peritoneal inflammatory change.  The focal fluid collection in the anterior pelvis seen on the prior exam is much smaller on this exam measuring 2.4 x 0.6 cm.  Also the focal fluid collection in the pelvic cul-de-sac has decreased in size measuring 1.4 x 1.0 cm on today's exam.    The abdominal aorta is normal in caliber.    There is a right ovarian cyst measuring 3.3 x 2.6 cm. The urinary bladder is unremarkable.    There is grade 2 retrolisthesis of L5 on S1.  Posterior spinal fusion is seen from L4-S2.            CTA Chest Non-Coronary (PE Study) (Final result) Result time:  02/01/17 18:31:04    Final result by Flako Cordova MD (02/01/17 18:31:04)    Impression:        In.  No pulmonary embolism.   2.  Moderate size right pleural effusion and small left pleural effusion with bilateral lower lobe atelectasis as described above.        All CT scans at this facility use dose modulation, iterative reconstruction and/or weight based dosing when appropriate to reduce radiation dose to as low as reasonably achievable.        Electronically signed by: FLAKO CORDOVA MD  Date:     02/01/17  Time:    18:31     Narrative:    Exam: CTA chest with intravenous contrast.    Clinical History: Chest pain.  Chest pressure, had clot in left subclavian.  Rule out pulmonary embolus.    Technique: Axial CTA images performed through the chest after the administration of 100 cc intravenous contrast. 3D MIP images were performed and interpreted.    Findings:        There is no evidence of pulmonary embolus. Pulmonary artery caliber is normal. The heart, great vessels, and mediastinal structures are within normal limits. No thoracic adenopathy.    There is marked compressive atelectasis of the basilar segments of the right lower lobe.  There is subsegmental atelectasis of the left lower lobe.  No pneumothorax. There is a moderate size right pleural effusion.  There is a trace left pleural effusion.    There is a  percutaneous gastrostomy tube in place.    The bones are intact.            X-Ray Abdomen Flat And Erect (Final result) Result time:  02/01/17 17:54:52    Final result by Flako Cordova MD (02/01/17 17:54:52)    Impression:             1. No radiographic evidence of small bowel obstruction or ileus.  2.  Right pleural effusion.      Electronically signed by: FLAKO CORDOVA MD  Date:     02/01/17  Time:    17:54     Narrative:    Exam: Abdomen radiograph, 2 views.    Clinical History:   Abdominal Pain.    Findings:    The bowel gas pattern is within normal limits.   Oral contrast is seen within the right colon and hepatic flexure.  Cholecystectomy clips are noted.  Spinal fusion hardware is noted..   No free air the peritoneum.. There is a right pleural effusion..            X-Ray Chest PA And Lateral (Final result) Result time:  02/01/17 17:56:44    Final result by Flako Cordova MD (02/01/17 17:56:44)    Impression:         Persistent small right pleural effusion.            Electronically signed by: FLAKO CORDOVA MD  Date:     02/01/17  Time:    17:56     Narrative:    Exam: Two-view chest radiograph    Clinical History: .  Chest pain     Comparison: Chest x-ray, 01/22/2017.    Findings:   Right pleural effusion persists, unchanged.  Right basilar atelectasis is noted. The cardiac silhouette is within normal limits. No pneumothorax. The bones are intact.             The EKG was ordered, reviewed, and independently interpreted by the ED provider.  Interpretation time: 3:01 PM  Rate: 98 BPM  Rhythm: normal sinus rhythm  Interpretation: No STEMI.        The Emergency Provider reviewed the vital signs and test results, which are outlined above.    ED Discussion       3:39 PM: Speaking with Dr. Jeansonne (cardiology), who recommends pt have CT with oral contrast.    3:46 PM: Re-evaluated pt. Pt is resting in NAD.  D/w pt all pertinent results and information. Updated pt on plan for CT and consult with Dr. Jeansonne. D/w pt  any concerns expressed at this time. Answered all questions. Pt expresses understanding at this time.       7:56 PM:  Discussed with pt all pertinent ED information and results. Discussed pt dx and plan of tx. Gave pt all f/u and return to the ED instructions.Pt to call to advance her f/u appt with Dr. Ivy and Dr. Jeansonne. All questions and concerns were addressed at this time. Pt expresses understanding of information and instructions, and is comfortable with plan to discharge. Pt is stable for discharge.    I discussed with patient and/or family/caretaker that evaluation in the ED does not suggest any emergent or life threatening medical conditions requiring immediate intervention beyond what was provided in the ED, and I believe patient is safe for discharge.  Regardless, an unremarkable evaluation in the ED does not preclude the development or presence of a serious of life threatening condition. As such, patient was instructed to return immediately for any worsening or change in current symptoms.        I have discussed with patient and/or family/caretaker chest pain precautions, specifically to return for worsening chest pain, shortness of breath, fever, or any concern.  I have low suspicion for cardiopulmonary, vascular, infectious, respiratory, or other emergent medical condition based on my evaluation in the ED.      Pre-hypertension/Hypertension: The pt has been informed that they may have pre-hypertension or hypertension based on a blood pressure reading in the ED. I recommend that the pt call the PCP listed on their discharge instructions or a physician of their choice this week to arrange f/u for further evaluation of possible pre-hypertension or hypertension.     ED Medication(s):  Medications   omnipaque 350 iohexol 30 mL (30 mLs Oral Given 2/1/17 1610)   ondansetron injection 4 mg (4 mg Intravenous Given 2/1/17 1623)   omnipaque 300 iohexol 100 mL (100 mLs Intravenous Given 2/1/17 1800)    oxycodone-acetaminophen  mg per tablet 1 tablet (1 tablet Oral Given 2/1/17 2004)       Discharge Medication List as of 2/1/2017  8:21 PM          Follow-up Information     Follow up with Thony Ivy MD. Schedule an appointment as soon as possible for a visit in 2 days.    Specialty:  Pulmonary Disease    Why:  Return to the ED for:   Worsening shortness of breath, difficulty breathing, fever, worsening chest pain, or other concerns.    Contact information:    [address]  [place] [address] [phone]          Follow up with Louis O. Jeansonne, MD. Schedule an appointment as soon as possible for a visit in 2 days.    Specialty:  General Surgery    Why:  Return to the ED for:   vomiting, abdominal pain, fever, unable to pass gas, weakness, or other concerns.     Contact information:    [address] [place] [address]  [phone]          Follow up with [facility]. Schedule an appointment as soon as possible for a visit in 2 days.    Specialty:  Obstetrics and Gynecology    Why:  Return to the ED for lower pelvic pain and vomiting.    Contact information:    [address] [facility] [id number] [phone]             Medical Decision Making    Medical Decision Making:   History:   Old Medical Records: I decided to obtain old medical records.  Old Records Summarized: records from previous admission(s).       <> Summary of Records: Lia Avila is a 36 year old female with history of IBS, functional GI disorder, s/p PEG tube placement who was admitted with small bowel obstruction, parapneumonic effusion, and left subclavian vein DVT.        SBO: Patient presented to ED with LLQ abdominal pain. CT abdomen consistent with developing SBO. PEG tube placed to suction for decompression without improvement. SBFT consistent with SBO. underwent small bowel resection on 1/03/17 for necrotic bowel. Diet slowly advanced and patient tolerated  well.     Parapneumonic Effusion: Patient continued to have leukocytosis postoperatively. Thoracentesis on 1/11/17 and 1/13/17 with exudative drainage. Patient continued to reaccumulate and required CT placement. Patient was followed by Pulmonology. Interventions also included intravenous antibiotics. CXR showed improvement with medical management. Leukocytosis resolved. Blood and pleural cultures were negative. She will continued with Augmentin and Clindamycin x 14 days. She was asked to follow up with Dr. Ivy in 2 weeks for pleural cytology results.     Left Subclavian Vein DVT- Patient required PICC line placement during hospitalization. She developed swelling in LUE. Ultrasound confirmed Left Subclavian vein DVT and was placed on heparin infusion. She was transitioned to Eliquis and will continued treatment for 6 months.   Clinical Tests:   Lab Tests: Ordered and Reviewed  Radiological Study: Ordered and Reviewed  Medical Tests: Ordered and Reviewed           Scribe Attestation:   Scribe #1: I performed the above scribed service and the documentation accurately describes the services I performed. I attest to the accuracy of the note.    Attending:   Physician Attestation Statement for Scribe #1: I, Kathi Gorman DO, personally performed the services described in this documentation, as scribed by Star Stovall in my presence, and it is both accurate and complete.          Clinical Impression       ICD-10-CM ICD-9-CM   1. Anemia, unspecified type D64.9 285.9   2. Chest pressure R07.89 786.59   3. Pleural effusion on right J90 511.9   4. Right ovarian cyst N83.201 620.2   5. Elevated blood pressure reading R03.0 796.2       Disposition:   Disposition: Discharged  Condition: Stable         Kathi Gorman DO  02/01/17 2126

## 2017-02-01 NOTE — ED NOTES
Unable to initiate IV using sonosite in R arm, spoke with Dr. Gorman about difficulties and states its fine to start IV on L arm at this time

## 2017-02-02 ENCOUNTER — TELEPHONE (OUTPATIENT)
Dept: PULMONOLOGY | Facility: CLINIC | Age: 37
End: 2017-02-02

## 2017-02-02 NOTE — DISCHARGE INSTRUCTIONS
Your blood pressure was elevated in the ED.  You may have high blood pressure.   Keep a log of your blood pressure and follow up with a Primary Care Provider within the next two weeks. Call [phone] for appointment.

## 2017-02-02 NOTE — TELEPHONE ENCOUNTER
----- Message from Timothy Collier sent at 2/2/2017  8:49 AM CST -----  Contact: Pt   Pt is requesting to be seen as soon as possible for a hospital follow up./ Pt has an appt on 2/22, but she is requesting to be seen sooner./ Pt can be reached at [phone] ([email or web address])

## 2017-02-03 ENCOUNTER — OFFICE VISIT (OUTPATIENT)
Dept: PULMONOLOGY | Facility: CLINIC | Age: 37
End: 2017-02-03
Payer: COMMERCIAL

## 2017-02-03 ENCOUNTER — PROCEDURE VISIT (OUTPATIENT)
Dept: PULMONOLOGY | Facility: CLINIC | Age: 37
End: 2017-02-03
Payer: COMMERCIAL

## 2017-02-03 VITALS
DIASTOLIC BLOOD PRESSURE: 78 MMHG | BODY MASS INDEX: 25.4 KG/M2 | WEIGHT: 138 LBS | SYSTOLIC BLOOD PRESSURE: 118 MMHG | HEART RATE: 116 BPM | OXYGEN SATURATION: 98 % | RESPIRATION RATE: 18 BRPM | HEIGHT: 62 IN

## 2017-02-03 DIAGNOSIS — R07.81 PLEURITIC CHEST PAIN: Primary | ICD-10-CM

## 2017-02-03 DIAGNOSIS — I82.B12 THROMBOSIS OF LEFT SUBCLAVIAN VEIN: ICD-10-CM

## 2017-02-03 DIAGNOSIS — J18.9 PARAPNEUMONIC EFFUSION: ICD-10-CM

## 2017-02-03 DIAGNOSIS — R06.02 SOB (SHORTNESS OF BREATH): Primary | ICD-10-CM

## 2017-02-03 DIAGNOSIS — R94.2 RESTRICTIVE VENTILATORY DEFECT: Chronic | ICD-10-CM

## 2017-02-03 DIAGNOSIS — E44.0 MALNUTRITION OF MODERATE DEGREE: Chronic | ICD-10-CM

## 2017-02-03 DIAGNOSIS — Z90.49 S/P SMALL BOWEL RESECTION: Chronic | ICD-10-CM

## 2017-02-03 DIAGNOSIS — K58.8 OTHER IRRITABLE BOWEL SYNDROME: Chronic | ICD-10-CM

## 2017-02-03 DIAGNOSIS — J91.8 PARAPNEUMONIC EFFUSION: ICD-10-CM

## 2017-02-03 PROBLEM — R10.13 EPIGASTRIC PAIN: Status: RESOLVED | Noted: 2017-01-10 | Resolved: 2017-02-03

## 2017-02-03 PROBLEM — E87.8 ELECTROLYTE IMBALANCE: Status: RESOLVED | Noted: 2017-01-17 | Resolved: 2017-02-03

## 2017-02-03 PROBLEM — N73.9 PELVIC ABSCESS IN FEMALE: Status: RESOLVED | Noted: 2017-01-18 | Resolved: 2017-02-03

## 2017-02-03 LAB
POST FEF 25 75: 2.16 L/S (ref 2.5–3.65)
POST FET 100: 7.47 S
POST FEV1 FVC: 93 %
POST FEV1: 1.47 L (ref 2.58–3.12)
POST FIF 50: 2.4 L/S
POST FVC: 1.58 L (ref 3.13–3.78)
POST PEF: 3.02 L/S (ref 5.86–7.46)
PRE FEF 25 75: 1.96 L/S (ref 2.5–3.65)
PRE FET 100: 6.91 S
PRE FEV1 FVC: 92 %
PRE FEV1: 1.46 L (ref 2.58–3.12)
PRE FIF 50: 2.53 L/S
PRE FVC: 1.58 L (ref 3.13–3.78)
PRE PEF: 3.39 L/S (ref 5.86–7.46)
PREDICTED FEV1 FVC: 82.73 % (ref 77.84–87.63)
PREDICTED FEV1: 2.85 L (ref 2.58–3.12)
PREDICTED FVC: 3.45 L (ref 3.13–3.78)
PROVOCATION PROTOCOL: ABNORMAL

## 2017-02-03 PROCEDURE — 90686 IIV4 VACC NO PRSV 0.5 ML IM: CPT | Mod: S$GLB,,, | Performed by: INTERNAL MEDICINE

## 2017-02-03 PROCEDURE — 90471 IMMUNIZATION ADMIN: CPT | Mod: S$GLB,,, | Performed by: INTERNAL MEDICINE

## 2017-02-03 PROCEDURE — 99214 OFFICE O/P EST MOD 30 MIN: CPT | Mod: 25,S$GLB,, | Performed by: INTERNAL MEDICINE

## 2017-02-03 PROCEDURE — 90670 PCV13 VACCINE IM: CPT | Mod: S$GLB,,, | Performed by: INTERNAL MEDICINE

## 2017-02-03 PROCEDURE — 90472 IMMUNIZATION ADMIN EACH ADD: CPT | Mod: S$GLB,,, | Performed by: INTERNAL MEDICINE

## 2017-02-03 PROCEDURE — 99999 PR PBB SHADOW E&M-EST. PATIENT-LVL III: CPT | Mod: PBBFAC,,, | Performed by: INTERNAL MEDICINE

## 2017-02-03 PROCEDURE — 94060 EVALUATION OF WHEEZING: CPT | Mod: S$GLB,,, | Performed by: INTERNAL MEDICINE

## 2017-02-03 RX ORDER — ATORVASTATIN CALCIUM 20 MG/1
20 TABLET, FILM COATED ORAL NIGHTLY
Refills: 1 | COMMUNITY
Start: 2017-01-23

## 2017-02-03 NOTE — PROGRESS NOTES
Subjective:       Lia Avila is a 36 y.o. female    This a follow-up visit.  Patient is accompanied by her mother.    Patient was in the hospital with the acute enteritis.    She had a laparotomy and then subsequently developed bilateral pleural effusions that needed chest tube in the left side and thoracentesis on the right side.  Pleural fluid WBC was elevated 5531.  Patient was treated with Augmentin and clindamycin.  She has made a remarkable recovery.  Her albumin has improved from 1.9 and is now 3.6  She was recently seen in the emergency room on Tuesday she is concerned that fluid had reaccumulated  I reviewed old imaging with patient and her mother  There is only very very trivial pleural fluid.  The pleural space is measured at around less than 2 cm.  This is markedly improved patient is taking ibuprofen more treatment and her symptoms have improved  No fever no cough no wheezing.  Her spirometry today consistent with a restrictive ventilatory defect  We have reviewed the literature for the anticoagulation for her acute upper extremity DVT.  Guidelines last at least a minimum of 3 months of anticoagulation per maximum of 6 months  I reviewed with her the risks of anticoagulation including bleeding  Patient will be advised to use barrier methods of birth control.  Pharmacological birth control would be contraindicated given her DVT  No smoking history  She will get a Prevnar 13 vaccination and influenza vaccination          The following portions of the patient's history were reviewed and updated as appropriate:   She  has a past medical history of Acid reflux; Aerophagia; Alcoholic; Anxiety; Depression; Functional gastrointestinal disorder; and Irritable bowel syndrome.  She  does not have any pertinent problems on file.  She  has a past surgical history that includes Cholecystectomy and Gastrostomy-jejeunostomy tube change/placement.  Her family history is not on file.  She  reports that she  quit smoking about 2 months ago. She smoked 0.25 packs per day. She does not have any smokeless tobacco history on file. She reports that she does not drink alcohol or use illicit drugs.  She has a current medication list which includes the following prescription(s): apixaban, apixaban, duloxetine, lorazepam, multivitamin, omeprazole, ondansetron, quetiapine, trazodone, and atorvastatin.  Current Outpatient Prescriptions on File Prior to Visit   Medication Sig Dispense Refill    apixaban 5 mg Tab Take 1 tablet (5 mg total) by mouth 2 (two) times daily. 30 tablet 5    duloxetine (CYMBALTA) 60 MG capsule Take 90 mg by mouth once daily.      lorazepam (ATIVAN) 0.5 MG tablet Take 0.5 mg by mouth every 6 (six) hours as needed.      multivitamin (THERAGRAN) tablet Take 1 tablet by mouth once daily. 30 tablet 1    omeprazole (PRILOSEC) 20 MG capsule Take 20 mg by mouth once daily.      ondansetron (ZOFRAN) 4 MG tablet Take 8 mg by mouth every 8 (eight) hours.      quetiapine (SEROQUEL) 100 MG Tab Take 300 mg by mouth every evening.       trazodone (DESYREL) 150 MG tablet Take 150 mg by mouth every evening.      [DISCONTINUED] DEXTROAMPHETAMINE/AMPHETAMINE (ADDERALL ORAL) Take 20 mg by mouth 2 (two) times daily.       [DISCONTINUED] imipramine (TOFRANIL) 50 MG tablet Take 100 mg by mouth every evening.       [DISCONTINUED] oxycodone (ROXICODONE) 10 mg Tab immediate release tablet Take 1 tablet (10 mg total) by mouth every 6 (six) hours as needed for Pain. 15 tablet 0    [DISCONTINUED] promethazine (PHENERGAN) 25 MG tablet Take 1 tablet (25 mg total) by mouth every 6 (six) hours as needed for Nausea. 15 tablet 0     No current facility-administered medications on file prior to visit.      She is allergic to sulfa (sulfonamide antibiotics)..    Review of Systems  A comprehensive review of systems was negative except for: Respiratory: positive for pleurisy/chest pain      Objective:        Visit Vitals    BP  "118/78    Pulse (!) 116    Resp 18    Ht 5' 2" (1.575 m)    Wt 62.6 kg (138 lb)    LMP 12/22/2016 (Approximate)    SpO2 98%    BMI 25.24 kg/m2     General appearance: alert, appears stated age, cooperative and no distress  Head: atraumatic  Eyes: negative findings: conjunctivae and sclerae normal  Neck: no adenopathy, no carotid bruit, no JVD, supple, symmetrical, trachea midline and thyroid not enlarged, symmetric, no tenderness/mass/nodules  Lungs: clear to auscultation bilaterally and normal percussion bilaterally  Heart: regular rate and rhythm, S1, S2 normal, no murmur, click, rub or gallop  Abdomen: soft, non-tender; bowel sounds normal; no masses,  no organomegaly and PEG  Extremities: extremities normal, atraumatic, no cyanosis or edema  Pulses: 2+ and symmetric  Skin: Skin color, texture, turgor normal. No rashes or lesions  Lymph nodes: Cervical, supraclavicular, and axillary nodes normal.  Neurologic: Grossly normal    Diagnostic Review  CT of chest was reviewed: effusion on right side  On Ct is only 2 cm in thickness   There is no evidence of pulmonary embolus. Pulmonary artery caliber is normal.   The heart, great vessels, and mediastinal structures are within normal limits. No thoracic adenopathy.    There is marked compressive atelectasis of the basilar segments of the right lower lobe.    There is subsegmental atelectasis of the left lower lobe.  No pneumothorax.   There is a moderate size right pleural effusion.  There is a trace left pleural effusion.*.    Pulmonary function tests: FEV1: 1.46  (51 % predicted), FVC:  1.58 (46 % predicted), FEV1/FVC:  92         Cytology to both  right and left reviewed      Assessment:     Problem List Items Addressed This Visit     Malnutrition of moderate degree (Chronic)     Albumin improved to 3.6         Irritable bowel syndrome (Chronic)     Will follow GI/Surgery         Restrictive ventilatory defect (Chronic)     FEV1 1.46( 51%), FVC 1.58( 46%). " FEV1/FVC 92         Relevant Orders    Pneumococcal conjugate vaccine 13-valent less than 6yo IM (Completed)    X-Ray Chest PA And Lateral    Complete PFT w/ bronchodilator    Thrombosis of left subclavian vein     Guidelines are 3-6 months anticoagulation  Risks of anticoagulation  Prescription for elliquis 6 months         Parapneumonic effusion     SP bilateral thoracentesis and left chest Tube  WBcc was elevated 5531 right side and left side 194   Treated and completed Abx         Relevant Orders    Pneumococcal conjugate vaccine 13-valent less than 6yo IM (Completed)    X-Ray Chest PA And Lateral    Complete PFT w/ bronchodilator    S/P small bowel resection     Follow up with surgery 02/16         Pleuritic chest pain - Primary     Had exudative effusion in hospital  SP chest tube left side  SP thoracentsis right side  Cytology was -ve  NSAID and deep breathing           Relevant Orders    Pneumococcal conjugate vaccine 13-valent less than 6yo IM (Completed)    X-Ray Chest PA And Lateral    Complete PFT w/ bronchodilator           Plan:    Come sooner if concerns  No indication for thoracentesis currently    Return in about 6 weeks (around 3/17/2017) for prevnar and flu vac, cxr, pft.    This note was prepared using voice recognition system and is likely to have sound alike errors that may have been overlooked even after proof reading.  Please call me with any questions    Discussed diagnosis, its evaluation, treatment and usual course. All questions answered.    Thank you for the courtesy of participating in the care of this patient    Thony Ivy MD

## 2017-02-03 NOTE — ASSESSMENT & PLAN NOTE
SP bilateral thoracentesis and left chest Tube  WBcc was elevated 5531 right side and left side 194   Treated and completed Abx

## 2017-02-03 NOTE — ASSESSMENT & PLAN NOTE
Guidelines are 3-6 months anticoagulation  Risks of anticoagulation  Prescription for elliquis 6 months

## 2017-02-03 NOTE — PATIENT INSTRUCTIONS
Duration of anticoagulation -- In patients with primary upper extremity deep vein thrombosis, anticoagulation should be continued for a minimum of three months following the initial thrombotic event, with a longer duration of therapy indicated for those who have had a recurrent event [63,110]. We maintain anticoagulation regardless of whether intervention (thrombolysis, thoracic outlet decompression) was performed

## 2017-02-03 NOTE — ASSESSMENT & PLAN NOTE
Had exudative effusion in hospital  SP chest tube left side  SP thoracentsis right side  Cytology was -ve  NSAID and deep breathing

## 2017-02-06 ENCOUNTER — PATIENT MESSAGE (OUTPATIENT)
Dept: SURGERY | Facility: CLINIC | Age: 37
End: 2017-02-06

## 2017-02-16 ENCOUNTER — OFFICE VISIT (OUTPATIENT)
Dept: SURGERY | Facility: CLINIC | Age: 37
End: 2017-02-16
Payer: COMMERCIAL

## 2017-02-16 VITALS
SYSTOLIC BLOOD PRESSURE: 117 MMHG | DIASTOLIC BLOOD PRESSURE: 80 MMHG | BODY MASS INDEX: 25.08 KG/M2 | WEIGHT: 137.13 LBS | HEART RATE: 97 BPM | TEMPERATURE: 98 F

## 2017-02-16 DIAGNOSIS — Z90.49 S/P SMALL BOWEL RESECTION: Primary | ICD-10-CM

## 2017-02-16 PROCEDURE — 99024 POSTOP FOLLOW-UP VISIT: CPT | Mod: S$GLB,,, | Performed by: SURGERY

## 2017-02-16 PROCEDURE — 99999 PR PBB SHADOW E&M-EST. PATIENT-LVL III: CPT | Mod: PBBFAC,,, | Performed by: SURGERY

## 2017-02-16 NOTE — LETTER
[facility]  [address] [place] [address]  Phone: [phone]  Fax: [phone] February 16, 2017     Patient: Lia Avila   YOB: 1980   Date of Visit: 2/16/2017       To Whom It May Concern:    It is my medical opinion that Lia Avila may return to work on 3/16/17 with no restrictions.    If you have any questions or concerns, please don't hesitate to contact my office.    Sincerely,         Louis O. Jeansonne, MD

## 2017-02-16 NOTE — MR AVS SNAPSHOT
[facility]  [address] [facility] [address]  Phone: [phone]  Fax: [phone]                  Lia Avila   2017 10:40 AM   Office Visit    Description:  Female : 1980   Provider:  Louis O. Jeansonne IV, MD   [facility]           Reason for Visit     Post-op Evaluation           Diagnoses this Visit        Comments    S/P small bowel resection    -  Primary            To Do List           Future Appointments        Provider Department Dept Phone    3/1/2017 10:30 AM [facility] CT1 LIMIT 500 LBS [facility] - [phone]    3/1/2017 11:00 AM [facility] CT1 LIMIT 500 LBS [facility] - [phone]    3/17/2017 2:30 PM [facility] XR2 [facility] [phone]    3/17/2017 2:50 PM PULMONARY LAB, [facility]- Pulmonary Function [facility] [phone]    3/17/2017 3:40 PM Thony Ivy MD [facility] [phone]      Goals (5 Years of Data)     None      [facility]     [facility] Nurse Care Line -  Assistance  Registered nurses in the [facility] provide clinical advisement, health education, appointment booking, and other advisory services.  Call for this free service at [phone].             Medications           Message regarding Medications     Verify the changes and/or additions to your medication regime listed below are the same as discussed with your clinician today.  If any of these changes or additions are incorrect, please notify your healthcare provider.             Verify that the below list of medications is an accurate representation of the medications you are currently taking.  If none reported, the list may be blank. If incorrect, please contact your healthcare provider. Carry this list with you in case of emergency.           Current Medications     apixaban (ELIQUIS) 5 mg Tab Take 5 mg by mouth 2 (two) times daily.    apixaban 5 mg  Tab Take 1 tablet (5 mg total) by mouth 2 (two) times daily.    atorvastatin (LIPITOR) 20 MG tablet     duloxetine (CYMBALTA) 60 MG capsule Take 90 mg by mouth once daily.    lorazepam (ATIVAN) 0.5 MG tablet Take 0.5 mg by mouth every 6 (six) hours as needed.    multivitamin (THERAGRAN) tablet Take 1 tablet by mouth once daily.    omeprazole (PRILOSEC) 20 MG capsule Take 20 mg by mouth once daily.    ondansetron (ZOFRAN) 4 MG tablet Take 8 mg by mouth every 8 (eight) hours.    quetiapine (SEROQUEL) 100 MG Tab Take 300 mg by mouth every evening.     trazodone (DESYREL) 150 MG tablet Take 150 mg by mouth every evening.           Clinical Reference Information           Your Vitals Were     BP Pulse Temp Weight Last Period BMI    117/80 97 98.3 °F (36.8 °C) (Oral) 62.2 kg (137 lb 2 oz) 12/22/2016 (Approximate) 25.08 kg/m2      Blood Pressure          Most Recent Value    BP  117/80      Allergies as of 2/16/2017     Sulfa (Sulfonamide Antibiotics)      Immunizations Administered on Date of Encounter - 2/16/2017     None      Orders Placed During Today's Visit     Future Labs/Procedures Expected by Expires    CT Abdomen Pelvis With Contrast  3/1/2017 2/17/2018    CT Chest With Contrast  3/1/2017 2/16/2018      Language Assistance Services     ATTENTION: Language assistance services are available, free of charge. Please call [phone].      ATENCIÓN: Si habla español, tiene a joseph disposición servicios gratuitos de asistencia lingüística. Llame al [phone].     [facility] Ý: N?u b?n nói Ti?ng Vi?t, có các d?ch v? h? tr? ngôn ng? mi?n phí dành cho b?n. G?i s? [phone].         [facility] complies with applicable Federal civil rights laws and does not discriminate on the basis of race, color, national origin, age, disability, or sex.

## 2017-02-16 NOTE — PROGRESS NOTES
C/o upper abdominal pain and fever    Lia Avila is status post small bowel resection and chest tube.  She is tolerating a regular diet. She c/o upper abdominal pain and fever to 100 every night.    PE: Abdomen is soft, non-tender, non-distended.  Incisions clean, dry, and intact.    A/P:  Repeat CT of chest/abd in about 2 weeks.  Patient to remove PEG tube at home.

## 2017-02-16 NOTE — LETTER
[facility]  [address] [place] [address]  Phone: [phone]  Fax: [phone] February 16, 2017     Patient: Lia Avila   YOB: 1980   Date of Visit: 2/16/2017       To Whom It May Concern:    It is my medical opinion that Lia Avila may return to work on 3/6/17 with no restrictions.    If you have any questions or concerns, please don't hesitate to contact my office.    Sincerely,         Louis O. Jeansonne, MD

## 2017-03-01 ENCOUNTER — HOSPITAL ENCOUNTER (OUTPATIENT)
Dept: RADIOLOGY | Facility: HOSPITAL | Age: 37
Discharge: HOME | End: 2017-03-01
Attending: SURGERY
Payer: COMMERCIAL

## 2017-03-01 DIAGNOSIS — Z90.49 S/P SMALL BOWEL RESECTION: ICD-10-CM

## 2017-03-01 PROCEDURE — 74177 CT ABD & PELVIS W/CONTRAST: CPT | Mod: TC

## 2017-03-01 PROCEDURE — 71260 CT THORAX DX C+: CPT | Mod: TC

## 2017-03-01 PROCEDURE — 25500020 PHARM REV CODE 255: Performed by: SURGERY

## 2017-03-01 RX ADMIN — IOHEXOL 75 ML: 350 INJECTION, SOLUTION INTRAVENOUS at 11:03

## 2017-03-01 RX ADMIN — IOHEXOL 30 ML: 350 INJECTION, SOLUTION INTRAVENOUS at 09:03

## 2017-03-02 ENCOUNTER — PATIENT MESSAGE (OUTPATIENT)
Dept: SURGERY | Facility: CLINIC | Age: 37
End: 2017-03-02

## 2017-03-02 DIAGNOSIS — N73.9 PELVIC ABSCESS IN FEMALE: Primary | ICD-10-CM

## 2017-03-07 ENCOUNTER — PATIENT MESSAGE (OUTPATIENT)
Dept: SURGERY | Facility: CLINIC | Age: 37
End: 2017-03-07

## 2017-03-14 ENCOUNTER — TELEPHONE (OUTPATIENT)
Dept: RADIOLOGY | Facility: HOSPITAL | Age: 37
End: 2017-03-14

## 2017-03-14 ENCOUNTER — PATIENT MESSAGE (OUTPATIENT)
Dept: SURGERY | Facility: CLINIC | Age: 37
End: 2017-03-14

## 2017-03-15 ENCOUNTER — HOSPITAL ENCOUNTER (OUTPATIENT)
Dept: RADIOLOGY | Facility: HOSPITAL | Age: 37
Discharge: HOME | End: 2017-03-15
Attending: SURGERY
Payer: COMMERCIAL

## 2017-03-15 DIAGNOSIS — N73.9 PELVIC ABSCESS IN FEMALE: ICD-10-CM

## 2017-03-15 PROCEDURE — 76830 TRANSVAGINAL US NON-OB: CPT | Mod: 26,,, | Performed by: RADIOLOGY

## 2017-03-15 PROCEDURE — 76856 US EXAM PELVIC COMPLETE: CPT | Mod: 26,,, | Performed by: RADIOLOGY

## 2017-03-15 PROCEDURE — 76856 US EXAM PELVIC COMPLETE: CPT | Mod: TC

## 2017-03-16 ENCOUNTER — PATIENT MESSAGE (OUTPATIENT)
Dept: SURGERY | Facility: CLINIC | Age: 37
End: 2017-03-16

## 2017-03-16 ENCOUNTER — TELEPHONE (OUTPATIENT)
Dept: SURGERY | Facility: CLINIC | Age: 37
End: 2017-03-16

## 2017-03-16 DIAGNOSIS — K65.1 INTRA-ABDOMINAL ABSCESS: Primary | ICD-10-CM

## 2017-03-16 NOTE — TELEPHONE ENCOUNTER
----- Message from Louis O. Jeansonne IV, MD sent at 3/16/2017  9:35 AM CDT -----  Schedule for CT-guided abscess drainage in radiology, MyChart message sent.

## 2017-03-16 NOTE — TELEPHONE ENCOUNTER
No ovarian cyst on ultrasound, so fluid collection seen on CT is likely an abscess.  Will schedule for IR drainage.

## 2017-03-16 NOTE — TELEPHONE ENCOUNTER
CT guided abscess drainage scheduled for Monday, March 27th at 8am, patient notified and voiced understandings.

## 2017-03-17 ENCOUNTER — OFFICE VISIT (OUTPATIENT)
Dept: PULMONOLOGY | Facility: CLINIC | Age: 37
End: 2017-03-17
Payer: COMMERCIAL

## 2017-03-17 ENCOUNTER — PROCEDURE VISIT (OUTPATIENT)
Dept: PULMONOLOGY | Facility: CLINIC | Age: 37
End: 2017-03-17
Payer: COMMERCIAL

## 2017-03-17 ENCOUNTER — HOSPITAL ENCOUNTER (OUTPATIENT)
Dept: RADIOLOGY | Facility: HOSPITAL | Age: 37
Discharge: HOME | End: 2017-03-17
Attending: INTERNAL MEDICINE
Payer: COMMERCIAL

## 2017-03-17 VITALS
DIASTOLIC BLOOD PRESSURE: 66 MMHG | HEART RATE: 77 BPM | WEIGHT: 142 LBS | BODY MASS INDEX: 26.13 KG/M2 | OXYGEN SATURATION: 99 % | HEIGHT: 62 IN | RESPIRATION RATE: 18 BRPM | SYSTOLIC BLOOD PRESSURE: 112 MMHG

## 2017-03-17 DIAGNOSIS — J18.9 PARAPNEUMONIC EFFUSION: ICD-10-CM

## 2017-03-17 DIAGNOSIS — R07.81 PLEURITIC CHEST PAIN: ICD-10-CM

## 2017-03-17 DIAGNOSIS — R94.2 DIFFUSION CAPACITY OF LUNG (DL), DECREASED: Chronic | ICD-10-CM

## 2017-03-17 DIAGNOSIS — J91.8 PARAPNEUMONIC EFFUSION: ICD-10-CM

## 2017-03-17 DIAGNOSIS — R94.2 RESTRICTIVE VENTILATORY DEFECT: Chronic | ICD-10-CM

## 2017-03-17 DIAGNOSIS — I82.B12 THROMBOSIS OF LEFT SUBCLAVIAN VEIN: Chronic | ICD-10-CM

## 2017-03-17 DIAGNOSIS — R94.2 ABNORMAL PFT: Primary | ICD-10-CM

## 2017-03-17 PROBLEM — E44.0 MALNUTRITION OF MODERATE DEGREE: Chronic | Status: RESOLVED | Noted: 2017-01-01 | Resolved: 2017-03-17

## 2017-03-17 LAB
POST FEF 25 75: 3.17 L/S (ref 2.46–3.62)
POST FET 100: 7.92 S
POST FEV1 FVC: 82 %
POST FEV1: 2.54 L (ref 2.56–3.1)
POST FIF 50: 2.46 L/S
POST FVC: 3.08 L (ref 3.12–3.77)
POST PEF: 6.01 L/S (ref 5.85–7.45)
PRE DLCO: 13.21 ML/MMHG/MIN (ref 17.35–25.64)
PRE ERV: 0.59 L
PRE FEF 25 75: 2.15 L/S (ref 2.46–3.62)
PRE FET 100: 8.74 S
PRE FEV1 FVC: 78 %
PRE FEV1: 2.46 L (ref 2.56–3.1)
PRE FIF 50: 4.77 L/S
PRE FRC PL: 2.93 L (ref 2.06–3.01)
PRE FVC: 3.17 L (ref 3.12–3.77)
PRE KROGHS K: 3.29 1/MIN
PRE PEF: 6.58 L/S (ref 5.85–7.45)
PRE RV: 2.08 L (ref 1.14–1.84)
PRE SVC: 3.17 L
PRE TLC: 5.25 L (ref 4.28–5.04)
PREDICTED DLCO: 21.5 ML/MMHG/MIN (ref 17.35–25.64)
PREDICTED FEV1 FVC: 82.52 % (ref 77.62–87.42)
PREDICTED FEV1: 2.83 L (ref 2.56–3.1)
PREDICTED FRC N2: 2.54 L (ref 2.06–3.01)
PREDICTED FRC PL: 2.54 L (ref 2.06–3.01)
PREDICTED FVC: 3.44 L (ref 3.12–3.77)
PREDICTED RV: 1.49 L (ref 1.14–1.84)
PREDICTED SVC: 3.12 L
PREDICTED TLC: 4.66 L (ref 4.28–5.04)
PROVOCATION PROTOCOL: ABNORMAL

## 2017-03-17 PROCEDURE — 94729 DIFFUSING CAPACITY: CPT | Mod: S$GLB,,, | Performed by: INTERNAL MEDICINE

## 2017-03-17 PROCEDURE — 99999 PR PBB SHADOW E&M-EST. PATIENT-LVL III: CPT | Mod: PBBFAC,,, | Performed by: INTERNAL MEDICINE

## 2017-03-17 PROCEDURE — 1160F RVW MEDS BY RX/DR IN RCRD: CPT | Mod: S$GLB,,, | Performed by: INTERNAL MEDICINE

## 2017-03-17 PROCEDURE — 71020 XR CHEST PA AND LATERAL: CPT | Mod: TC,PO

## 2017-03-17 PROCEDURE — 94060 EVALUATION OF WHEEZING: CPT | Mod: 59,S$GLB,, | Performed by: INTERNAL MEDICINE

## 2017-03-17 PROCEDURE — 99214 OFFICE O/P EST MOD 30 MIN: CPT | Mod: 25,S$GLB,, | Performed by: INTERNAL MEDICINE

## 2017-03-17 PROCEDURE — 71020 XR CHEST PA AND LATERAL: CPT | Mod: 26,,, | Performed by: RADIOLOGY

## 2017-03-17 PROCEDURE — 94726 PLETHYSMOGRAPHY LUNG VOLUMES: CPT | Mod: 51,S$GLB,, | Performed by: INTERNAL MEDICINE

## 2017-03-17 RX ORDER — METHYLPHENIDATE HYDROCHLORIDE 10 MG/1
10 TABLET ORAL
COMMUNITY
Start: 2017-02-27 | End: 2018-03-28

## 2017-03-17 NOTE — MR AVS SNAPSHOT
[facility]  [address] [facility] [address]  [place] [address]  Phone: [phone]  Fax: [phone]                  Lia Avila   3/17/2017 3:40 PM   Office Visit    Description:  Female : 1980   Provider:  Thony Ivy MD   Department:  [facility] - Pulmonary Services           Reason for Visit     pleuritic chest pain           Diagnoses this Visit        Comments    Abnormal PFT    -  Primary FEV1 improved from 1.46 L to 2.46 L  FVC improved from 1.58 L to 3.17 L  FEV1 was 2.46 (87% predicted) FVC was 3.17 (92% predicted) FEV1/FEC was 78.  TLC was 5.    Thrombosis of left subclavian vein         Diffusion capacity of lung (dl), decreased                To Do List           Future Appointments        Provider Department Dept Phone    3/27/2017 9:00 AM [facility] CT1 LIMIT 500 LBS [facility] -  [phone]    2017 2:45 PM [facility] XR2 [facility] [phone]    2017 3:00 PM PULMONARY LAB, [facility]- Pulmonary Function Svcs [phone]    2017 3:40 PM Thony Ivy MD [facility] [phone]      Goals (5 Years of Data)     None      Follow-Up and Disposition     Return in about 5 months (around 2017) for cxr, luis.       These Medications        Disp Refills Start End    apixaban (ELIQUIS) 5 mg Tab 60 tablet 4 3/17/2017 2017    Take 1 tablet (5 mg total) by mouth 2 (two) times daily. - Oral    Pharmacy: [facility] [facility] [place] - [id number] Ph #: [phone]         [facility]     [facility] Nurse Care Line -  Assistance  Registered nurses in the [facility] provide clinical advisement, health education, appointment booking, and other advisory services.  Call for this free service at [phone].             Medications           Message regarding Medications     Verify the changes and/or additions to your medication regime listed below are the  "same as discussed with your clinician today.  If any of these changes or additions are incorrect, please notify your healthcare provider.        CHANGE how you are taking these medications     Start Taking Instead of    apixaban (ELIQUIS) 5 mg Tab apixaban (ELIQUIS) 5 mg Tab    Dosage:  Take 1 tablet (5 mg total) by mouth 2 (two) times daily. Dosage:  Take 5 mg by mouth 2 (two) times daily.    Reason for Change:  Reorder            Verify that the below list of medications is an accurate representation of the medications you are currently taking.  If none reported, the list may be blank. If incorrect, please contact your healthcare provider. Carry this list with you in case of emergency.           Current Medications     apixaban (ELIQUIS) 5 mg Tab Take 1 tablet (5 mg total) by mouth 2 (two) times daily.    apixaban 5 mg Tab Take 1 tablet (5 mg total) by mouth 2 (two) times daily.    atorvastatin (LIPITOR) 20 MG tablet     duloxetine (CYMBALTA) 60 MG capsule Take 90 mg by mouth once daily.    lorazepam (ATIVAN) 0.5 MG tablet Take 0.5 mg by mouth every 6 (six) hours as needed.    methylphenidate (RITALIN) 10 MG tablet Take 10 mg by mouth.    multivitamin (THERAGRAN) tablet Take 1 tablet by mouth once daily.    omeprazole (PRILOSEC) 20 MG capsule Take 20 mg by mouth once daily.    ondansetron (ZOFRAN) 4 MG tablet Take 8 mg by mouth every 8 (eight) hours.    quetiapine (SEROQUEL) 100 MG Tab Take 300 mg by mouth every evening.     trazodone (DESYREL) 150 MG tablet Take 150 mg by mouth every evening.           Clinical Reference Information           Your Vitals Were     BP Pulse Resp Height Weight SpO2    112/66 77 18 5' 2" (1.575 m) 64.4 kg (142 lb) 99%    BMI                25.97 kg/m2          Blood Pressure          Most Recent Value    BP  112/66      Allergies as of 3/17/2017     Sulfa (Sulfonamide Antibiotics)      Immunizations Administered on Date of Encounter - 3/17/2017     None      Orders Placed During " Today's Visit     Future Labs/Procedures Expected by Expires    X-Ray Chest PA And Lateral  3/17/2017 3/17/2018    Complete PFT without bronchodilator - Clinic  As directed 3/17/2018      Language Assistance Services     ATTENTION: Language assistance services are available, free of charge. Please call [phone].      ATENCIÓN: Si habla español, tiene a joseph disposición servicios gratuitos de asistencia lingüística. Llame al [phone].     CHÚ Ý: N?u b?n nói Ti?ng Vi?t, có các d?ch v? h? tr? ngôn ng? mi?n phí dành cho b?n. G?i s? [phone].         [facility] complies with applicable Federal civil rights laws and does not discriminate on the basis of race, color, national origin, age, disability, or sex.

## 2017-03-17 NOTE — PROGRESS NOTES
Subjective:      Lia Avila is a 37 y.o. female with subclavian vein thrombosis and parapneumonic effusion.  Symptoms have completely resolved no cough no wheezing no shortness of breath.  She still has some residual neuropathic pain at the site of the chest up  Spirometry shows that FEV1 and FVC of significantly improved  PFTs were reviewed today on a mild impairment in the diffusion capacity  She is taking Eliquis for subclavian vein thrombosis  Refill for prescription was made  I'll see her and of July 2017  Two-view chest x-ray was reviewed which was clear.  Chest CT scan performed 3/1/2017 was reviewed no effusion lungs look clear      The following portions of the patient's history were reviewed and updated as appropriate:   She  has a past medical history of Acid reflux; Aerophagia; Alcoholic; Anxiety; Depression; Functional gastrointestinal disorder; and Irritable bowel syndrome.  She  does not have any pertinent problems on file.  She  has a past surgical history that includes Cholecystectomy and Gastrostomy-jejeunostomy tube change/placement.  Her family history is not on file.  She  reports that she quit smoking about 3 months ago. She smoked 0.25 packs per day. She does not have any smokeless tobacco history on file. She reports that she does not drink alcohol or use illicit drugs.  She has a current medication list which includes the following prescription(s): apixaban, apixaban, atorvastatin, duloxetine, lorazepam, methylphenidate, multivitamin, omeprazole, ondansetron, quetiapine, and trazodone.  Current Outpatient Prescriptions on File Prior to Visit   Medication Sig Dispense Refill    apixaban 5 mg Tab Take 1 tablet (5 mg total) by mouth 2 (two) times daily. 30 tablet 5    atorvastatin (LIPITOR) 20 MG tablet   1    duloxetine (CYMBALTA) 60 MG capsule Take 90 mg by mouth once daily.      lorazepam (ATIVAN) 0.5 MG tablet Take 0.5 mg by mouth every 6 (six) hours as needed.       "multivitamin (THERAGRAN) tablet Take 1 tablet by mouth once daily. 30 tablet 1    omeprazole (PRILOSEC) 20 MG capsule Take 20 mg by mouth once daily.      ondansetron (ZOFRAN) 4 MG tablet Take 8 mg by mouth every 8 (eight) hours.      quetiapine (SEROQUEL) 100 MG Tab Take 300 mg by mouth every evening.       trazodone (DESYREL) 150 MG tablet Take 150 mg by mouth every evening.      [DISCONTINUED] apixaban (ELIQUIS) 5 mg Tab Take 5 mg by mouth 2 (two) times daily.       No current facility-administered medications on file prior to visit.      She is allergic to sulfa (sulfonamide antibiotics)..    Review of Systems  A comprehensive review of systems was negative.       Objective:      /66  Pulse 77  Resp 18  Ht 5' 2" (1.575 m)  Wt 64.4 kg (142 lb)  SpO2 99%  BMI 25.97 kg/m2    General appearance: alert, appears stated age, cooperative and no distress  Head: Normocephalic, without obvious abnormality, atraumatic  Lungs: clear to auscultation bilaterally and normal percussion bilaterally  Heart: regular rate and rhythm, S1, S2 normal, no murmur, click, rub or gallop  Extremities: extremities normal, atraumatic, no cyanosis or edema  Pulses: 2+ and symmetric  Skin: Skin color, texture, turgor normal. No rashes or lesions  Lymph nodes: Cervical, supraclavicular, and axillary nodes normal.         Two-view chest x-ray was reviewed with patient clear lung fields    Chest CT scan performed 3/1/2017 was reviewed with patient no pleural effusion complete resolution of pleural effusions    Spirometry was normal  FEV1 improved from 1.46 L to 2.46 L  FVC improved from 1.58 L to 3.17 L    FEV1 was 2.46 (87% predicted) FVC was 3.17 (92% predicted) FEV1/FEC was 78.    TLC was 5.25 (113% predicted) DLCO was 13.2 (61% predicted)        Assessment:       Problem List Items Addressed This Visit     Thrombosis of left subclavian vein    Relevant Medications    apixaban (ELIQUIS) 5 mg Tab    Abnormal PFT - Primary    " Relevant Orders    X-Ray Chest PA And Lateral    Complete PFT without bronchodilator - Clinic      Other Visit Diagnoses     Diffusion capacity of lung (dl), decreased  (Chronic)  (Chronic)      TLC was 5.25 (113% predicted) DLCO was 13.2 (61% predicted)      Relevant Orders    X-Ray Chest PA And Lateral    Complete PFT without bronchodilator - Clinic        Plan:     Return in about 5 months (around 8/17/2017) for cxr, luis.    This note was prepared using voice recognition system and is likely to have sound alike errors that may have been overlooked even after proof reading.  Please call me with any questions    Discussed diagnosis, its evaluation, treatment and usual course. All questions answered.    Thank you for the courtesy of participating in the care of this patient    Thony Ivy MD

## 2017-03-23 ENCOUNTER — PATIENT MESSAGE (OUTPATIENT)
Dept: SURGERY | Facility: CLINIC | Age: 37
End: 2017-03-23

## 2017-03-23 DIAGNOSIS — K58.8 OTHER IRRITABLE BOWEL SYNDROME: Primary | Chronic | ICD-10-CM

## 2017-03-24 ENCOUNTER — TELEPHONE (OUTPATIENT)
Dept: PULMONOLOGY | Facility: CLINIC | Age: 37
End: 2017-03-24

## 2017-03-27 ENCOUNTER — HOSPITAL ENCOUNTER (OUTPATIENT)
Dept: RADIOLOGY | Facility: HOSPITAL | Age: 37
Discharge: HOME | End: 2017-03-27
Attending: SURGERY
Payer: COMMERCIAL

## 2017-03-28 ENCOUNTER — HOSPITAL ENCOUNTER (OUTPATIENT)
Facility: HOSPITAL | Age: 37
Discharge: HOME | End: 2017-03-28
Attending: INTERNAL MEDICINE | Admitting: INTERNAL MEDICINE
Payer: COMMERCIAL

## 2017-03-28 ENCOUNTER — PATIENT MESSAGE (OUTPATIENT)
Dept: SURGERY | Facility: CLINIC | Age: 37
End: 2017-03-28

## 2017-03-28 ENCOUNTER — ANESTHESIA (OUTPATIENT)
Dept: ENDOSCOPY | Facility: HOSPITAL | Age: 37
End: 2017-03-28
Payer: COMMERCIAL

## 2017-03-28 ENCOUNTER — SURGERY (OUTPATIENT)
Age: 37
End: 2017-03-28

## 2017-03-28 ENCOUNTER — ANESTHESIA EVENT (OUTPATIENT)
Dept: ENDOSCOPY | Facility: HOSPITAL | Age: 37
End: 2017-03-28
Payer: COMMERCIAL

## 2017-03-28 ENCOUNTER — PATIENT MESSAGE (OUTPATIENT)
Dept: GASTROENTEROLOGY | Facility: HOSPITAL | Age: 37
End: 2017-03-28

## 2017-03-28 VITALS
DIASTOLIC BLOOD PRESSURE: 57 MMHG | HEIGHT: 62 IN | BODY MASS INDEX: 25.03 KG/M2 | WEIGHT: 136 LBS | OXYGEN SATURATION: 98 % | RESPIRATION RATE: 20 BRPM | SYSTOLIC BLOOD PRESSURE: 96 MMHG | RESPIRATION RATE: 41 BRPM | HEART RATE: 62 BPM | TEMPERATURE: 98 F

## 2017-03-28 DIAGNOSIS — T85.598D FEEDING TUBE DYSFUNCTION, SUBSEQUENT ENCOUNTER: ICD-10-CM

## 2017-03-28 DIAGNOSIS — K31.84 GASTRIC ATONY: ICD-10-CM

## 2017-03-28 DIAGNOSIS — T85.598A FEEDING TUBE DYSFUNCTION, INITIAL ENCOUNTER: Primary | ICD-10-CM

## 2017-03-28 DIAGNOSIS — K58.8 OTHER IRRITABLE BOWEL SYNDROME: Chronic | ICD-10-CM

## 2017-03-28 DIAGNOSIS — Z90.49 S/P SMALL BOWEL RESECTION: ICD-10-CM

## 2017-03-28 DIAGNOSIS — R10.9 ABDOMINAL PAIN: ICD-10-CM

## 2017-03-28 DIAGNOSIS — F45.8 AEROPHAGIA: Chronic | ICD-10-CM

## 2017-03-28 DIAGNOSIS — F45.8: Chronic | ICD-10-CM

## 2017-03-28 DIAGNOSIS — K31.84 GASTROPARESIS: ICD-10-CM

## 2017-03-28 LAB
B-HCG UR QL: NEGATIVE
CTP QC/QA: YES

## 2017-03-28 PROCEDURE — 88305 TISSUE EXAM BY PATHOLOGIST: CPT | Mod: 26,,, | Performed by: PATHOLOGY

## 2017-03-28 PROCEDURE — 88305 TISSUE EXAM BY PATHOLOGIST: CPT | Performed by: PATHOLOGY

## 2017-03-28 PROCEDURE — 63600175 PHARM REV CODE 636 W HCPCS: Performed by: NURSE ANESTHETIST, CERTIFIED REGISTERED

## 2017-03-28 PROCEDURE — 81025 URINE PREGNANCY TEST: CPT | Performed by: INTERNAL MEDICINE

## 2017-03-28 PROCEDURE — 37000009 HC ANESTHESIA EA ADD 15 MINS: Performed by: INTERNAL MEDICINE

## 2017-03-28 PROCEDURE — 25000003 PHARM REV CODE 250: Performed by: NURSE ANESTHETIST, CERTIFIED REGISTERED

## 2017-03-28 PROCEDURE — 37000008 HC ANESTHESIA 1ST 15 MINUTES: Performed by: INTERNAL MEDICINE

## 2017-03-28 PROCEDURE — 43239 EGD BIOPSY SINGLE/MULTIPLE: CPT | Mod: ,,, | Performed by: INTERNAL MEDICINE

## 2017-03-28 PROCEDURE — 43239 EGD BIOPSY SINGLE/MULTIPLE: CPT | Performed by: INTERNAL MEDICINE

## 2017-03-28 PROCEDURE — 25000003 PHARM REV CODE 250: Performed by: INTERNAL MEDICINE

## 2017-03-28 PROCEDURE — 27201012 HC FORCEPS, HOT/COLD, DISP: Performed by: INTERNAL MEDICINE

## 2017-03-28 RX ORDER — LIDOCAINE HCL/PF 100 MG/5ML
SYRINGE (ML) INTRAVENOUS
Status: DISCONTINUED | OUTPATIENT
Start: 2017-03-28 | End: 2017-03-28

## 2017-03-28 RX ORDER — SODIUM CHLORIDE, SODIUM LACTATE, POTASSIUM CHLORIDE, CALCIUM CHLORIDE 600; 310; 30; 20 MG/100ML; MG/100ML; MG/100ML; MG/100ML
INJECTION, SOLUTION INTRAVENOUS CONTINUOUS PRN
Status: DISCONTINUED | OUTPATIENT
Start: 2017-03-28 | End: 2017-03-28

## 2017-03-28 RX ORDER — PROPOFOL 10 MG/ML
INJECTION, EMULSION INTRAVENOUS
Status: DISCONTINUED | OUTPATIENT
Start: 2017-03-28 | End: 2017-03-28

## 2017-03-28 RX ORDER — SODIUM CHLORIDE, SODIUM LACTATE, POTASSIUM CHLORIDE, CALCIUM CHLORIDE 600; 310; 30; 20 MG/100ML; MG/100ML; MG/100ML; MG/100ML
INJECTION, SOLUTION INTRAVENOUS CONTINUOUS
Status: DISCONTINUED | OUTPATIENT
Start: 2017-03-28 | End: 2017-03-28 | Stop reason: HOSPADM

## 2017-03-28 RX ADMIN — SODIUM CHLORIDE, SODIUM LACTATE, POTASSIUM CHLORIDE, AND CALCIUM CHLORIDE: .6; .31; .03; .02 INJECTION, SOLUTION INTRAVENOUS at 10:03

## 2017-03-28 RX ADMIN — PROPOFOL 130 MG: 10 INJECTION, EMULSION INTRAVENOUS at 10:03

## 2017-03-28 RX ADMIN — LIDOCAINE HYDROCHLORIDE 100 MG: 20 INJECTION, SOLUTION INTRAVENOUS at 10:03

## 2017-03-28 RX ADMIN — PROPOFOL 30 MG: 10 INJECTION, EMULSION INTRAVENOUS at 11:03

## 2017-03-28 RX ADMIN — SODIUM CHLORIDE, SODIUM LACTATE, POTASSIUM CHLORIDE, AND CALCIUM CHLORIDE: 600; 310; 30; 20 INJECTION, SOLUTION INTRAVENOUS at 10:03

## 2017-03-28 NOTE — IP AVS SNAPSHOT
[address] [place] [address]           Patient Discharge Instructions   Our goal is to set you up for success. This packet includes information on your condition, medications, and your home care.  It will help you care for yourself to prevent having to return to the hospital.     Please ask your nurse if you have any questions.      There are many details to remember when preparing to leave the hospital. Here is what you will need to do:    1. Take your medicine. If you are prescribed medications, review your Medication List on the following pages. You may have new medications to  at the pharmacy and others that you'll need to stop taking. Review the instructions for how and when to take your medications. Talk with your doctor or nurses if you are unsure of what to do.     2. Go to your follow-up appointments. Specific follow-up information is listed in the following pages. Your may be contacted by a nurse or clinical provider about future appointments. Be sure we have all of the phone numbers to reach you. Please contact your provider's office if you are unable to make an appointment.     3. Watch for warning signs. Your doctor or nurse will give you detailed warning signs to watch for and when to call for assistance. These instructions may also include educational information about your condition. If you experience any of warning signs to your health, call your doctor.               ** Verify the list of medication(s) below is accurate and up to date. Carry this with you in case of emergency. If your medications have changed, please notify your healthcare provider.             Medication List      CONTINUE taking these medications        Additional Info                      * apixaban 5 mg Tab   Quantity:  30 tablet   Refills:  5   Dose:  5 mg    Instructions:  Take 1 tablet (5 mg total) by mouth 2 (two) times daily.     Begin Date    AM    Noon    PM     Bedtime       * apixaban 5 mg Tab   Commonly known as:  ELIQUIS   Quantity:  60 tablet   Refills:  4   Dose:  5 mg    Instructions:  Take 1 tablet (5 mg total) by mouth 2 (two) times daily.     Begin Date    AM    Noon    PM    Bedtime       atorvastatin 20 MG tablet   Commonly known as:  LIPITOR   Refills:  1      Begin Date    AM    Noon    PM    Bedtime       duloxetine 60 MG capsule   Commonly known as:  CYMBALTA   Refills:  0   Dose:  90 mg    Instructions:  Take 90 mg by mouth once daily.     Begin Date    AM    Noon    PM    Bedtime       lorazepam 0.5 MG tablet   Commonly known as:  ATIVAN   Refills:  0   Dose:  0.5 mg    Instructions:  Take 0.5 mg by mouth every 6 (six) hours as needed.     Begin Date    AM    Noon    PM    Bedtime       methylphenidate 10 MG tablet   Commonly known as:  RITALIN   Refills:  0   Dose:  10 mg    Instructions:  Take 10 mg by mouth.     Begin Date    AM    Noon    PM    Bedtime       quetiapine 100 MG Tab   Commonly known as:  SEROQUEL   Refills:  0   Dose:  300 mg    Instructions:  Take 300 mg by mouth every evening.     Begin Date    AM    Noon    PM    Bedtime       trazodone 150 MG tablet   Commonly known as:  DESYREL   Refills:  0   Dose:  150 mg    Instructions:  Take 150 mg by mouth every evening.     Begin Date    AM    Noon    PM    Bedtime       * Notice:  This list has 2 medication(s) that are the same as other medications prescribed for you. Read the directions carefully, and ask your doctor or other care provider to review them with you.      STOP taking these medications     multivitamin tablet   Commonly known as:  THERAGRAN       omeprazole 20 MG capsule   Commonly known as:  PRILOSEC       ondansetron 4 MG tablet   Commonly known as:  ZOFRAN                  Please bring to all follow up appointments:    1. A copy of your discharge instructions.  2. All medicines you are currently taking in their original bottles.  3. Identification and insurance  card.    Please arrive 15 minutes ahead of scheduled appointment time.    Please call 24 hours in advance if you must reschedule your appointment and/or time.        Your Scheduled Appointments     Jul 14, 2017  2:45 PM CDT   Diagnostic Xray with SUMCHANDRAKANT XR2   [facility] ([facility])    [address]  [facility] [address] [phone]            Jul 14, 2017  3:00 PM CDT   Complete PFT with PULMONARY LAB, [facility] ([facility])    [address] [address] [phone]            Jul 14, 2017  3:40 PM CDT   Established Patient Visit with Thony Ivy MD   [facility] ([facility])    [address] [address] [phone]              Follow-up Information     Follow up with Akanksha West MD.    Specialty:  Internal Medicine    Why:  As needed    Contact information:    [address]  [address]  [facility] [id number] [phone]          Discharge Instructions     Future Orders    Activity as tolerated     Call MD for:  difficulty breathing, headache or visual disturbances     Call MD for:  hives     Call MD for:  persistent dizziness or light-headedness     Call MD for:  persistent nausea and vomiting     Call MD for:  redness, tenderness, or signs of infection (pain, swelling, redness, odor or green/yellow discharge around incision site)     Call MD for:  severe uncontrolled pain     Call MD for:  temperature >100.4     Diet general     Questions:    Total calories:      Fat restriction, if any:      Protein restriction, if any:      Na restriction, if any:      Fluid restriction:      Additional restrictions:      No dressing needed         Discharge Instructions       If you develop fevers, severe abdominal pain, significant bleeding, nausea or vomiting, please contact us or come to our Emergency Department at [facility].  Our  contact number is [phone] available for emergencies or  "any question that you may have.      You may return to normal activities tomorrow.  Written discharge instructions were provided to you today and have them handy since you may not remember our conversation today.       I also recommend that you follow a high fiber diet and take calcium supplements daily as well as to continue your present medications.      If you take Aspirin, please resume taking it at your prior dose today. If we obtained biopsies or removed polyps during your procedure, we will contact you within 5 to 6 days with the results.      Thanks for trusting us with your healthcare needs.      Sincerely,     Pop Schwab M.D.        To rate your experience with Dr. 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      Admission Information     Date & Time Provider Department CSN    3/28/2017 10:16 AM Pop Schwab MD [facility] [id number]      Care Providers     Provider Role Specialty Primary office phone    Pop Schwab MD Attending Provider Gastroenterology [phone]    Pop Schwab MD Surgeon  Gastroenterology [phone]      Your Vitals Were     BP Pulse Temp Resp Height Weight    94/52 66 97.9 °F (36.6 °C) (Oral) 66 5' 2" (1.575 m) 61.7 kg (136 lb)    Last Period SpO2 BMI          03/07/2017 99% 24.87 kg/m2        Recent Lab Values        1/2/2017                           5:05 AM           A1C 4.6           Comment for A1C at  5:05 AM on 1/2/2017:  According to ADA guidelines, hemoglobin A1C <7.0% represents  optimal control in non-pregnant diabetic patients.  Different  metrics may apply to specific populations.   Standards of Medical Care in Diabetes - 2016.  For the purpose of screening for the presence of diabetes:  <5.7%     Consistent with the absence of diabetes  5.7-6.4%  " Consistent with increasing risk for diabetes   (prediabetes)  >or=6.5%  Consistent with diabetes  Currently no consensus exists for use of hemoglobin A1C  for diagnosis of diabetes for children.        Pending Labs     Order Current Status    Specimen to Pathology - Surgery Collected (03/28/17 1104)      Allergies as of 3/28/2017        Reactions    Sulfa (Sulfonamide Antibiotics)       [facility]     [facility] Nurse Care Line - 24/7 Assistance  Unless otherwise directed by your provider, please contact [facility], our nurse care line that is available for 24/7 assistance.     Registered nurses in the [facility] provide clinical advisement, health education, appointment booking, and other advisory services.  Call for this free service at [phone].        Advance Directives     An advance directive is a document which, in the event you are no longer able to make decisions for yourself, tells your healthcare team what kind of treatment you do or do not want to receive, or who you would like to make those decisions for you.  If you do not currently have an advance directive, [facility] encourages you to create one.  For more information call:  [phone]), [phone] ([phone]),  or log on to [email or web address].        Smoking Cessation     If you would like to quit smoking:   You may be eligible for free services if you are a [place] resident and started smoking cigarettes before September 1, 1988.  Call the Smoking Cessation Trust ([facility]) toll free at ([phone]) 279-2647 or ([phone].   Call 4-146-QUIT-NOW if you do not meet the above criteria.            Language Assistance Services     ATTENTION: Language assistance services are available, free of charge. Please call [phone].      ATENCIÓN: Si habla español, tiene a joseph disposición servicios gratuitos de asistencia lingüística. Llame al [phone].     CHÚ Ý: N?u b?n nói Ti?ng Vi?t, có các d?ch  v? h? tr? ngôn ng? mi?n phí dành cho b?n. G?i s? [phone].        [employer] [facility] complies with applicable Federal civil rights laws and does not discriminate on the basis of race, color, national origin, age, disability, or sex.

## 2017-03-28 NOTE — ANESTHESIA POSTPROCEDURE EVALUATION
"Anesthesia Post Evaluation    Patient: Lia Avila    Procedure(s) Performed: Procedure(s) (LRB):  ESOPHAGOGASTRODUODENOSCOPY (EGD) (N/A)    Final Anesthesia Type: MAC  Patient location during evaluation: PACU  Patient participation: Yes- Able to Participate  Level of consciousness: awake and alert and oriented  Post-procedure vital signs: reviewed and stable  Pain management: adequate  Airway patency: patent  PONV status at discharge: No PONV  Anesthetic complications: no      Cardiovascular status: blood pressure returned to baseline  Respiratory status: unassisted, room air and spontaneous ventilation  Hydration status: euvolemic  Follow-up not needed.        Visit Vitals    BP (!) 90/58 (BP Location: Right leg, Patient Position: Lying, BP Method: Automatic)    Pulse 65    Temp 36.6 °C (97.9 °F) (Oral)    Resp (!) 66    Ht 5' 2" (1.575 m)    Wt 61.7 kg (136 lb)    LMP 03/07/2017    SpO2 97%    Breastfeeding No    BMI 24.87 kg/m2       Pain/Matt Score: Pain Assessment Performed: Yes (3/28/2017 11:07 AM)  Presence of Pain: non-verbal indicators absent (3/28/2017 11:07 AM)  Matt Score: 9 (3/28/2017 11:07 AM)      "

## 2017-03-28 NOTE — ANESTHESIA RELEASE NOTE
"Anesthesia Release from PACU Note    Patient: Lia Avila    Procedure(s) Performed: Procedure(s) (LRB):  ESOPHAGOGASTRODUODENOSCOPY (EGD) (N/A)    Anesthesia type: MAC    Post pain: Adequate analgesia    Post assessment: no apparent anesthetic complications, tolerated procedure well and no evidence of recall    Last Vitals:   Visit Vitals    BP (!) 90/58 (BP Location: Right leg, Patient Position: Lying, BP Method: Automatic)    Pulse 65    Temp 36.6 °C (97.9 °F) (Oral)    Resp (!) 66    Ht 5' 2" (1.575 m)    Wt 61.7 kg (136 lb)    LMP 03/07/2017    SpO2 97%    Breastfeeding No    BMI 24.87 kg/m2       Post vital signs: stable     Level of consciousness: awake    Nausea/Vomiting: no nausea/no vomiting    Complications: none    Airway Patency: patent    Respiratory: unassisted, spontaneous ventilation, room air    Cardiovascular: stable and blood pressure at baseline    Hydration: euvolemic  "

## 2017-03-28 NOTE — INTERVAL H&P NOTE
"The patient has been examined and the H&P has been reviewed:    I concur with the findings and no changes have occurred since H&P was written.    Anesthesia/Surgery risks, benefits and alternative options discussed and understood by patient/family.    Past Medical History:   Diagnosis Date    Acid reflux     Aerophagia     Alcoholic     recovering    Anxiety     Depression     Functional gastrointestinal disorder     Irritable bowel syndrome      Past Surgical History:   Procedure Laterality Date    CHOLECYSTECTOMY      GASTROSTOMY-JEJEUNOSTOMY TUBE CHANGE/PLACEMENT       No current facility-administered medications on file prior to encounter.      Current Outpatient Prescriptions on File Prior to Encounter   Medication Sig Dispense Refill    apixaban (ELIQUIS) 5 mg Tab Take 1 tablet (5 mg total) by mouth 2 (two) times daily. 60 tablet 4    atorvastatin (LIPITOR) 20 MG tablet   1    duloxetine (CYMBALTA) 60 MG capsule Take 90 mg by mouth once daily.      lorazepam (ATIVAN) 0.5 MG tablet Take 0.5 mg by mouth every 6 (six) hours as needed.      methylphenidate (RITALIN) 10 MG tablet Take 10 mg by mouth.      ondansetron (ZOFRAN) 4 MG tablet Take 8 mg by mouth every 8 (eight) hours.      quetiapine (SEROQUEL) 100 MG Tab Take 300 mg by mouth every evening.       trazodone (DESYREL) 150 MG tablet Take 150 mg by mouth every evening.      apixaban 5 mg Tab Take 1 tablet (5 mg total) by mouth 2 (two) times daily. 30 tablet 5    multivitamin (THERAGRAN) tablet Take 1 tablet by mouth once daily. 30 tablet 1    omeprazole (PRILOSEC) 20 MG capsule Take 20 mg by mouth once daily.       Review of patient's allergies indicates:   Allergen Reactions    Sulfa (sulfonamide antibiotics)      BP (!) 98/56 (BP Location: Left arm, Patient Position: Lying, BP Method: Automatic)  Pulse 67  Temp 97.9 °F (36.6 °C) (Oral)   Resp 18  Ht 5' 2" (1.575 m)  Wt 61.7 kg (136 lb)  LMP 03/07/2017  SpO2 98%  Breastfeeding? " No  BMI 24.87 kg/m2    General Appearance:  Alert, cooperative, no distress, appears stated age   Head:  Normocephalic, without obvious abnormality, atraumatic   Eyes:  PERRL, conjunctiva/corneas clear, EOM's intact, fundi benign, both eyes   Throat: Lips, mucosa, and tongue normal; teeth and gums normal   Neck: Supple, symmetrical, trachea midline, no adenopathy;  thyroid: not enlarged, symmetric, no tenderness/mass/nodules; no carotid bruit or JVD   Lungs:   Clear to auscultation bilaterally, respirations unlabored   Heart:  Regular rate and rhythm, S1 and S2 normal, no murmur, rub, or gallop   Abdomen:   Soft, non-tender, bowel sounds active all four quadrants,  no masses, no organomegaly   Pelvic: Deferred   Extremities: Extremities normal, atraumatic, no cyanosis or edema   Pulses: 2+ and symmetric   Skin: Skin color, texture, turgor normal, no rashes or lesions   Lymph nodes: Cervical, supraclavicular, and axillary nodes normal   Neurologic: Normal           Active Hospital Problems    Diagnosis  POA    *Irritable bowel syndrome [K58.9]  Yes     Chronic    Abdominal pain [R10.9]  Yes    S/P small bowel resection [Z90.49]  Not Applicable    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

## 2017-03-28 NOTE — TRANSFER OF CARE
"Anesthesia Transfer of Care Note    Patient: Lia Avila    Procedure(s) Performed: Procedure(s) (LRB):  ESOPHAGOGASTRODUODENOSCOPY (EGD) (N/A)    Patient location: PACU    Anesthesia Type: MAC    Transport from OR: Transported from OR on room air with adequate spontaneous ventilation    Post pain: adequate analgesia    Post assessment: no apparent anesthetic complications    Post vital signs: stable    Level of consciousness: sedated    Nausea/Vomiting: no nausea/vomiting    Complications: none          Last vitals:   Visit Vitals    BP (!) 90/58 (BP Location: Right leg, Patient Position: Lying, BP Method: Automatic)    Pulse 65    Temp 36.6 °C (97.9 °F) (Oral)    Resp (!) 66    Ht 5' 2" (1.575 m)    Wt 61.7 kg (136 lb)    LMP 03/07/2017    SpO2 97%    Breastfeeding No    BMI 24.87 kg/m2     "

## 2017-03-28 NOTE — ANESTHESIA PREPROCEDURE EVALUATION
03/28/2017  Lia Avila is a 37 y.o., female.    [facility] Anesthesia Evaluation    I have reviewed the Patient Summary Reports.    I have reviewed the Nursing Notes.   I have reviewed the Medications.     Review of Systems  Anesthesia Hx:  No problems with previous Anesthesia    Social:  No Alcohol Use, Former Smoker    Hematology/Oncology:  Hematology Normal   Oncology Normal     EENT/Dental:EENT/Dental Normal   Cardiovascular:   Exercise tolerance: good    Pulmonary:   Snore   Renal/:  Renal/ Normal     Hepatic/GI:   GERD, well controlled 2000 last drink of fluid.   Musculoskeletal:   Arthritis     Neurological:  Neurology Normal    Endocrine:  Endocrine Normal    Dermatological:  Skin Normal    Psych:   Psychiatric History          Physical Exam  General:  Well nourished                 Anesthesia Plan  Type of Anesthesia, risks & benefits discussed:  Anesthesia Type:  MAC  Patient's Preference:   Intra-op Monitoring Plan:   Intra-op Monitoring Plan Comments:   Post Op Pain Control Plan:   Post Op Pain Control Plan Comments:   Induction:   IV  Beta Blocker:  Patient is not currently on a Beta-Blocker (No further documentation required).       Informed Consent: Patient understands risks and agrees with Anesthesia plan.  Questions answered. Anesthesia consent signed with patient.  ASA Score: 2     Day of Surgery Review of History & Physical: I have interviewed and examined the patient. I have reviewed the patient's H&P dated: 03/28/17. There are no significant changes.  H&P update referred to the surgeon.         Ready For Surgery From Anesthesia Perspective.

## 2017-03-28 NOTE — DISCHARGE SUMMARY
Endoscopy Discharge Summary      Admit Date: 3/28/2017    Discharge Date and Time:  3/28/2017 11:19 AM    Attending Physician: Pop Schwab MD     Discharge Physician: Pop Schwab MD     Principal Admitting Diagnoses: Irritable bowel syndrome         Discharge Diagnosis: The primary encounter diagnosis was Feeding tube dysfunction, initial encounter. Diagnoses of Other irritable bowel syndrome, S/P small bowel resection, Digestive disorder due to psychological factors, Gastric atony, Aerophagia, Abdominal pain, Feeding tube dysfunction, subsequent encounter, and Gastroparesis were also pertinent to this visit.     Discharged Condition: Good    Indication for Admission: Irritable bowel syndrome     Hospital Course: Patient was admitted for an inpatient procedure and tolerated the procedure well with no complications.    Significant Diagnostic Studies: EGD with biopsies.     Pathology (if any):  Specimen (12h ago through future)    Start     Ordered    03/28/17 1102  Specimen to Pathology - Surgery  Once     Comments:  1. Small bowel biopsy  1. Antrum biopsy r/o h.pylori and celiac disease    03/28/17 1104          Disposition: Home.    Bleeding: None    No Implants    Follow Up/Patient Instructions:   Current Discharge Medication List      CONTINUE these medications which have NOT CHANGED    Details   !! apixaban (ELIQUIS) 5 mg Tab Take 1 tablet (5 mg total) by mouth 2 (two) times daily.  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      lorazepam (ATIVAN) 0.5 MG tablet Take 0.5 mg by mouth every 6 (six) hours as needed.      methylphenidate (RITALIN) 10 MG tablet Take 10 mg by mouth.      quetiapine (SEROQUEL) 100 MG Tab Take 300 mg by mouth every evening.       trazodone (DESYREL) 150 MG tablet Take 150 mg by mouth every evening.      !!  apixaban 5 mg Tab Take 1 tablet (5 mg total) by mouth 2 (two) times daily.  Qty: 30 tablet, Refills: 5       !! - Potential duplicate medications found. Please discuss with provider.      STOP taking these medications       ondansetron (ZOFRAN) 4 MG tablet Comments:   Reason for Stopping:         multivitamin (THERAGRAN) tablet Comments:   Reason for Stopping:         omeprazole (PRILOSEC) 20 MG capsule Comments:   Reason for Stopping:                 Discharge Procedure Orders  Diet general     Activity as tolerated     Call MD for:  temperature >100.4     Call MD for:  persistent nausea and vomiting     Call MD for:  severe uncontrolled pain     Call MD for:  difficulty breathing, headache or visual disturbances     Call MD for:  redness, tenderness, or signs of infection (pain, swelling, redness, odor or green/yellow discharge around incision site)     Call MD for:  hives     Call MD for:  persistent dizziness or light-headedness     No dressing needed         Follow-up Information     Follow up with Akanksha West MD.    Specialty:  Internal Medicine    Why:  As needed    Contact information:    [address]  [address]  [facility] [facility] [address] [phone]

## 2017-03-28 NOTE — DISCHARGE INSTRUCTIONS
If you develop fevers, severe abdominal pain, significant bleeding, nausea or vomiting, please contact us or come to our Emergency Department at [facility].  Our  contact number is [phone] available for emergencies or any question that you may have.      You may return to normal activities tomorrow.  Written discharge instructions were provided to you today and have them handy since you may not remember our conversation today.       I also recommend that you follow a high fiber diet and take calcium supplements daily as well as to continue your present medications.      If you take Aspirin, please resume taking it at your prior dose today. If we obtained biopsies or removed polyps during your procedure, we will contact you within 5 to 6 days with the results.      Thanks for trusting us with your healthcare needs.      Sincerely,     Pop Schwab M.D.        To rate your experience with Dr. Schwab, please click on the link below     [email or web address]

## 2017-03-29 ENCOUNTER — PATIENT MESSAGE (OUTPATIENT)
Dept: SURGERY | Facility: CLINIC | Age: 37
End: 2017-03-29

## 2017-03-30 ENCOUNTER — PATIENT MESSAGE (OUTPATIENT)
Dept: GASTROENTEROLOGY | Facility: CLINIC | Age: 37
End: 2017-03-30

## 2017-03-30 NOTE — TELEPHONE ENCOUNTER
FINAL PATHOLOGIC DIAGNOSIS  1. Small bowel, biopsy:  Normal small bowel mucosa.  Negative for significant inflammation and villous blunting.  2. Stomach, antrum, biopsy:  Normal gastric mucosa.  No Helicobacter identified on routine stain.  Separate fragment of normal small bowel mucosa.    Biopsy results sent to the patient.    douglas

## 2017-03-31 ENCOUNTER — OFFICE VISIT (OUTPATIENT)
Dept: SURGERY | Facility: CLINIC | Age: 37
End: 2017-03-31
Payer: COMMERCIAL

## 2017-03-31 VITALS
BODY MASS INDEX: 25.73 KG/M2 | SYSTOLIC BLOOD PRESSURE: 123 MMHG | WEIGHT: 140.63 LBS | TEMPERATURE: 100 F | HEART RATE: 106 BPM | DIASTOLIC BLOOD PRESSURE: 79 MMHG

## 2017-03-31 DIAGNOSIS — Z90.49 S/P SMALL BOWEL RESECTION: Primary | ICD-10-CM

## 2017-03-31 PROCEDURE — 99999 PR PBB SHADOW E&M-EST. PATIENT-LVL III: CPT | Mod: PBBFAC,,, | Performed by: SURGERY

## 2017-03-31 PROCEDURE — 1160F RVW MEDS BY RX/DR IN RCRD: CPT | Mod: S$GLB,,, | Performed by: SURGERY

## 2017-03-31 PROCEDURE — 99215 OFFICE O/P EST HI 40 MIN: CPT | Mod: 24,S$GLB,, | Performed by: SURGERY

## 2017-03-31 NOTE — PROGRESS NOTES
Lia is a 37-year-old white female patient who is being seen at the request of Dr. Louis jeansonne for the evaluation of known gastroparesis.  She has very complex surgical history.  She recently undergo exploratory laparotomy by Dr. jeansonne and had a small bowel resection done.  Subsequent to the operation she developed a pelvic abscess which was managed medically.  She has gastrostomy in place.  The gastrostomy has been placed over 5 years ago.  Because of ongoing bloating and postprandial fullness as well as early satiety, she was suspected of gastroparesis.  She did undergo upper endoscopic examination by Dr. Schwab and confirmed the presence of significant amount of retained food in the stomach.  Enterra therapy symptom monitor worksheet scores reveals that the patient's vomiting 3 out of 3, nausea 3 out of 4, early satiety 4 out of 4, bloating 4 out of 4, postprandial fullness 44, epigastric pain 3 out of 4 with epigastric burning 0 out of 0.  She does have all the symptoms and signs of gastroparesis.  She had been on prokinetics such as domperidone and Reglan without any improvements.  She has had extensive biofeedback training without any positive results.  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  Without all this intervention, it doesn't seem to help her to relieve of this persistent symptoms of gastroparesis.  She was referred for surgical evaluation.    The physical exam reveals long midline incision which is healing.  The incision is clear from recent surgery.  She is nontender to palpation.  The gastrostomy tube is in place.  The upper abdomen is slightly bloated.  The rest of the physical exam is unremarkable.  Next    Assessment reveals that patient has symptoms and signs of gastroparesis.  But because of the recent exploratory laparotomy as well as past surgical intervention with gastrostomy in place,  the likelihood of the difficulty and complexity of placement of gastric stimulator is not impossible but difficult.  Because of procedure requires complete sterile field, simple expiration of the abdomen would jeopardize this.  This was discussed with the patient.  Patient was reassured that her gastrostomy can be replaced with MAGALI button and follow-up with me in 3 months for reassessment.  Total time approximate 60 minute was spent for this patient, and more than 50% of that was spent for treatment planning and counseling.    Milton Encarnacion

## 2017-06-14 ENCOUNTER — PATIENT MESSAGE (OUTPATIENT)
Dept: SURGERY | Facility: CLINIC | Age: 37
End: 2017-06-14

## 2017-06-14 ENCOUNTER — PATIENT MESSAGE (OUTPATIENT)
Dept: GASTROENTEROLOGY | Facility: CLINIC | Age: 37
End: 2017-06-14

## 2017-07-14 ENCOUNTER — HOSPITAL ENCOUNTER (OUTPATIENT)
Dept: RADIOLOGY | Facility: HOSPITAL | Age: 37
Discharge: HOME | End: 2017-07-14
Attending: INTERNAL MEDICINE
Payer: COMMERCIAL

## 2017-07-14 ENCOUNTER — PROCEDURE VISIT (OUTPATIENT)
Dept: PULMONOLOGY | Facility: CLINIC | Age: 37
End: 2017-07-14
Payer: COMMERCIAL

## 2017-07-14 ENCOUNTER — OFFICE VISIT (OUTPATIENT)
Dept: PULMONOLOGY | Facility: CLINIC | Age: 37
End: 2017-07-14
Payer: COMMERCIAL

## 2017-07-14 VITALS
HEIGHT: 62 IN | RESPIRATION RATE: 12 BRPM | SYSTOLIC BLOOD PRESSURE: 102 MMHG | DIASTOLIC BLOOD PRESSURE: 70 MMHG | HEART RATE: 87 BPM | OXYGEN SATURATION: 99 % | WEIGHT: 150 LBS | BODY MASS INDEX: 27.6 KG/M2

## 2017-07-14 DIAGNOSIS — R94.2 DIFFUSION CAPACITY OF LUNG (DL), DECREASED: Chronic | ICD-10-CM

## 2017-07-14 DIAGNOSIS — R94.2 ABNORMAL PFT: ICD-10-CM

## 2017-07-14 DIAGNOSIS — R94.2 ABNORMAL PFT: Primary | ICD-10-CM

## 2017-07-14 DIAGNOSIS — I82.B12 THROMBOSIS OF LEFT SUBCLAVIAN VEIN: ICD-10-CM

## 2017-07-14 LAB
POST FEF 25 75: 3.46 L/S (ref 2.46–3.62)
POST FET 100: 7.14 S
POST FEV1 FVC: 89 %
POST FEV1: 2.68 L (ref 2.56–3.1)
POST FIF 50: 3.08 L/S
POST FVC: 3.02 L (ref 3.12–3.77)
POST PEF: 5.44 L/S (ref 5.85–7.45)
PRE DLCO: 16.34 ML/MMHG/MIN (ref 17.97–26.26)
PRE ERV: 0.46 L
PRE FEF 25 75: 3.08 L/S (ref 2.46–3.62)
PRE FET 100: 7.22 S
PRE FEV1 FVC: 89 %
PRE FEV1: 2.58 L (ref 2.56–3.1)
PRE FIF 50: 4.25 L/S
PRE FRC PL: 2.6 L (ref 2–2.95)
PRE FVC: 2.89 L (ref 3.12–3.77)
PRE KROGHS K: 3.51 1/MIN
PRE PEF: 6.27 L/S (ref 5.85–7.45)
PRE RV: 2.16 L (ref 1.14–1.84)
PRE SVC: 3.04 L
PRE TLC: 5.2 L (ref 4.28–5.04)
PREDICTED DLCO: 22.12 ML/MMHG/MIN (ref 17.97–26.26)
PREDICTED FEV1 FVC: 82.52 % (ref 77.62–87.42)
PREDICTED FEV1: 2.83 L (ref 2.56–3.1)
PREDICTED FRC N2: 2.47 L (ref 2–2.95)
PREDICTED FRC PL: 2.47 L (ref 2–2.95)
PREDICTED FVC: 3.44 L (ref 3.12–3.77)
PREDICTED RV: 1.49 L (ref 1.14–1.84)
PREDICTED SVC: 3.12 L
PREDICTED TLC: 4.66 L (ref 4.28–5.04)
PROVOCATION PROTOCOL: ABNORMAL

## 2017-07-14 PROCEDURE — 94729 DIFFUSING CAPACITY: CPT | Mod: S$GLB,,, | Performed by: INTERNAL MEDICINE

## 2017-07-14 PROCEDURE — 99214 OFFICE O/P EST MOD 30 MIN: CPT | Mod: 25,S$GLB,, | Performed by: INTERNAL MEDICINE

## 2017-07-14 PROCEDURE — 71020 XR CHEST PA AND LATERAL: CPT | Mod: TC,PO

## 2017-07-14 PROCEDURE — 94060 EVALUATION OF WHEEZING: CPT | Mod: S$GLB,,, | Performed by: INTERNAL MEDICINE

## 2017-07-14 PROCEDURE — 94726 PLETHYSMOGRAPHY LUNG VOLUMES: CPT | Mod: 51,S$GLB,, | Performed by: INTERNAL MEDICINE

## 2017-07-14 PROCEDURE — 99999 PR PBB SHADOW E&M-EST. PATIENT-LVL III: CPT | Mod: PBBFAC,,, | Performed by: INTERNAL MEDICINE

## 2017-07-14 PROCEDURE — 71020 XR CHEST PA AND LATERAL: CPT | Mod: 26,,, | Performed by: RADIOLOGY

## 2017-07-14 RX ORDER — DEXTROAMPHETAMINE SACCHARATE, AMPHETAMINE ASPARTATE, DEXTROAMPHETAMINE SULFATE AND AMPHETAMINE SULFATE 3.75; 3.75; 3.75; 3.75 MG/1; MG/1; MG/1; MG/1
60 TABLET ORAL DAILY
COMMUNITY
Start: 2017-06-08 | End: 2018-09-11

## 2017-07-14 RX ORDER — METFORMIN HYDROCHLORIDE 500 MG/1
500 TABLET ORAL 2 TIMES DAILY WITH MEALS
COMMUNITY
End: 2021-06-04

## 2017-07-14 RX ORDER — ERGOCALCIFEROL 1.25 MG/1
1000 CAPSULE ORAL
COMMUNITY
End: 2018-03-26 | Stop reason: ALTCHOICE

## 2017-07-14 RX ORDER — QUETIAPINE 300 MG/1
100 TABLET, FILM COATED, EXTENDED RELEASE ORAL DAILY
COMMUNITY
Start: 2017-06-02 | End: 2018-12-15

## 2017-07-14 RX ORDER — ONDANSETRON 4 MG/1
4 TABLET, FILM COATED ORAL
COMMUNITY
Start: 2017-05-01 | End: 2018-03-26

## 2017-07-14 NOTE — PROGRESS NOTES
"Subjective:       Patient ID: Lia Avila is a 37 y.o. female.    Chief Complaint: Shortness of Breath (abn pft  - rev cxr pft)    Ms. Fitzgerald is 37 years old  Follow-up for abnormal PFTs and subclavian vein thrombosis  She has nearly completed 6 months of anticoagulation with Eliquis  Eliquis will be discontinued July 31, 2017  No cough no wheezing no shortness of breath  X-rays clear   All tests were reviewed with the patient's  PFTs showed that the diffusion capacity improved by 24%.  I have reviewed the patient's medical history in detail and updated the computerized patient record.        Review of Systems   Constitutional: Negative.    HENT: Negative.    Eyes: Negative.    Respiratory: Negative.    Cardiovascular: Negative.    Genitourinary: Negative.    Endocrine: endocrine negative   Musculoskeletal: Negative.    Skin: Negative.    Gastrointestinal: Negative.    Neurological: Negative.    Psychiatric/Behavioral: Negative.        Objective:       Vitals:    07/14/17 1633   BP: 102/70   Pulse: 87   Resp: 12   SpO2: 99%   Weight: 68 kg (150 lb)   Height: 5' 2" (1.575 m)     Physical Exam   Constitutional: She is oriented to person, place, and time. She appears well-developed and well-nourished.   HENT:   Head: Normocephalic.   Nose: Nose normal.   Mouth/Throat: Oropharynx is clear and moist.   Neck: Normal range of motion. Neck supple. No tracheal deviation present. No thyromegaly present.   Cardiovascular: Normal rate, regular rhythm and normal heart sounds.    Pulmonary/Chest: Normal expansion and symmetric chest wall expansion.   Abdominal: Soft.   PEG   Neurological: She is alert and oriented to person, place, and time.   Skin: Skin is warm and dry.   Psychiatric: She has a normal mood and affect. Her behavior is normal.   Nursing note and vitals reviewed.    Personal Diagnostic Review  Chest x-ray:   Findings: No infiltrates.  There stable blunting of the left costophrenic angle.  A PEG tube is " noted.  The heart is not enlarged.   Impression         1.  No acute cardiopulmonary process.         Pulmonary function tests: FEV1: 2.58  (91 % predicted), FVC:  2.89 (84 % predicted), FEV1/FVC:  89, T.20 (112 % predicted), RV/TLVC: 42 (130 % predicted), DLCO: 16.3 (74 % predicted)    Normal spirometry  Lung volumes show hyperinflation  DLCO improved    No flowsheet data found.      Assessment:       Problem List Items Addressed This Visit     Thrombosis of left subclavian vein    Abnormal PFT - Primary      Other Visit Diagnoses    None.       Plan:       End iqu    Return if symptoms worsen or fail to improve.    This note was prepared using voice recognition system and is likely to have sound alike errors that may have been overlooked even after proof reading.  Please call me with any questions    Discussed diagnosis, its evaluation, treatment and usual course. All questions answered.    Thank you for the courtesy of participating in the care of this patient    Thony Ivy MD

## 2017-07-17 ENCOUNTER — PATIENT MESSAGE (OUTPATIENT)
Dept: GASTROENTEROLOGY | Facility: CLINIC | Age: 37
End: 2017-07-17

## 2017-07-17 ENCOUNTER — OFFICE VISIT (OUTPATIENT)
Dept: GASTROENTEROLOGY | Facility: CLINIC | Age: 37
End: 2017-07-17
Payer: COMMERCIAL

## 2017-07-17 VITALS
HEART RATE: 77 BPM | SYSTOLIC BLOOD PRESSURE: 120 MMHG | DIASTOLIC BLOOD PRESSURE: 64 MMHG | HEIGHT: 62 IN | WEIGHT: 148.88 LBS | BODY MASS INDEX: 27.4 KG/M2

## 2017-07-17 DIAGNOSIS — T40.601A NARCOTIC BOWEL SYNDROME: ICD-10-CM

## 2017-07-17 DIAGNOSIS — K59.00 CONSTIPATION, UNSPECIFIED CONSTIPATION TYPE: ICD-10-CM

## 2017-07-17 DIAGNOSIS — Z90.49 S/P SMALL BOWEL RESECTION: ICD-10-CM

## 2017-07-17 DIAGNOSIS — K63.89 NARCOTIC BOWEL SYNDROME: ICD-10-CM

## 2017-07-17 DIAGNOSIS — K31.84 GASTROPARESIS: Primary | ICD-10-CM

## 2017-07-17 DIAGNOSIS — Z93.4 SMALL BOWEL TUBE FEEDING: ICD-10-CM

## 2017-07-17 PROCEDURE — 99999 PR PBB SHADOW E&M-EST. PATIENT-LVL III: CPT | Mod: PBBFAC,,, | Performed by: INTERNAL MEDICINE

## 2017-07-17 PROCEDURE — 99215 OFFICE O/P EST HI 40 MIN: CPT | Mod: S$GLB,,, | Performed by: INTERNAL MEDICINE

## 2017-07-17 NOTE — PATIENT INSTRUCTIONS
Constipation (Adult)  Constipation means that you have bowel movements that are less frequent than usual. Stools often become very hard and difficult to pass.  Constipation is very common. At some point in life it affects almost everyone. Since everyone's bowel habits are different, what is constipation to one person may not be to another. Your healthcare provider may do tests to diagnose constipation. It depends on what he or she finds when evaluating you.    Symptoms of constipation include:  · Abdominal pain  · Bloating  · Vomiting  · Painful bowel movements  · Itching, swelling, bleeding, or pain around the anus  Causes  Constipation can have many causes. These include:  · Diet low in fiber  · Too much dairy  · Not drinking enough liquids  · Lack of exercise or physical activity. This is especially true for older adults.  · Changes in lifestyle or daily routine, including pregnancy, aging, work, and travel  · Frequent use or misuse of laxatives  · Ignoring the urge to have a bowel movement or delaying it until later  · Medicines, such as certain prescription pain medicines, iron supplements, antacids, certain antidepressants, and calcium supplements  · Diseases like irritable bowel syndrome, bowel obstructions, stroke, diabetes, thyroid disease, Parkinson disease, hemorrhoids, and colon cancer  Complications  Potential complications of constipation can include:  · Hemorrhoids  · Rectal bleeding from hemorrhoids or anal fissures (skin tears)  · Hernias  · Dependency on laxatives  · Chronic constipation  · Fecal impaction  · Bowel obstruction or perforation  Home care  All treatment should be done after talking with your healthcare provider. This is especially true if you have another medical problems, are taking prescription medicines, or are an older adult. Treatment most often involves lifestyle changes. You may also need medicines. Your healthcare provider will tell you which will work best for you. Follow  the advice below to help avoid this problem in the future.  Lifestyle changes  These lifestyle changes can help prevent constipation:  · Diet. Eat a high-fiber diet, with fresh fruit and vegetables, and reduce dairy intake, meats, and processed foods  · Fluids. It's important to get enough fluids each day. Drink plenty of water when you eat more fiber. If you are on diet that limits the amount of fluid you can have, talk about this with your healthcare provider.  · Regular exercise. Check with your healthcare provider first.  Medications  Take any medicines as directed. Some laxatives are safe to use only every now and then. Others can be taken on a regular basis. Talk with your doctor or pharmacist if you have questions.  Prescription pain medicines can cause constipation. If you are taking this kind of medicine, ask your healthcare provider if you should also take a stool softener.  Medicines you may take to treat constipation include:  · Fiber supplements  · Stool softeners  · Laxatives  · Enemas  · Rectal suppositories  Follow-up care  Follow up with your healthcare provider if symptoms don't get better in the next few days. You may need to have more tests or see a specialist.  Call 911  Call 911 if any of these occur:  · Trouble breathing  · Stiff, rigid abdomen that is severely painful to touch  · Confusion  · Fainting or loss of consciousness  · Rapid heart rate  · Chest pain  When to seek medical advice  Call your healthcare provider right away if any of these occur:  · Fever over 100.4°F (38°C)  · Failure to resume normal bowel movements  · Pain in your abdomen or back gets worse  · Nausea or vomiting  · Swelling in your abdomen  · Blood in the stool  · Black, tarry stool  · Involuntary weight loss  · Weakness  Date Last Reviewed: 12/30/2015  © 8785-6928 [employer]. [address] [place] [address], [place] [address]. All rights reserved. This information is not intended as a substitute for  professional medical care. Always follow your healthcare professional's instructions.        Gastroparesis     Gastroparesis means that food and fluids move too slowly out of the stomach into the duodenum.   Gastroparesis (also called delayed gastric emptying) happens when the stomach takes longer than normal to empty of food. This is due to a problem with motility (the movement of the muscles in the digestive tract). For many people, gastroparesis is a lifelong condition. But treatment can help relieve symptoms and prevent complications. Read on to learn more about gastroparesis and how it can be managed.  How gastroparesis develops  With normal motility, signals from nerves tell the stomach muscles when to contract. These muscles move food from the stomach into the duodenum (the first part of the small bowel). With gastroparesis, the nerves or muscles are damaged. This causes motility to slow down or stop completely. As a result, food cannot move from the stomach properly. This delayed emptying can cause nausea, vomiting, and other symptoms. Malnutrition can result. Bezoars (hardened lumps of food) can form in the stomach and cause other complications as well.  Causes of gastroparesis  Gastroparesis can be caused by any of the following:  · Diabetes  · Surgery involving any of the digestive organs, such as the stomach and bowels  · Certain medicines, such as strong pain medicines (narcotics)  · Certain conditions, such as systemic scleroderma, Parkinson disease, and thyroid disease  · After a viral illness  In many cases, the cause of gastroparesis cannot be found.  Signs and symptoms of gastroparesis  These can include:  · Nausea and vomiting  · Feeling full quickly when eating  · Belly pain  · Heartburn  · Belly bloating  · Weight loss  · Loss of appetite  · High and low blood sugar levels (in people with diabetes)  Diagnosing gastroparesis  Your healthcare provider will ask about your symptoms and health  history. Youll also be examined. In addition, blood tests and X-rays are often done to check your health and rule out other problems. To confirm the problem, you may need other tests as well. These can include:  · Upper endoscopy. This is done to see inside the stomach and duodenum. For the test, an endoscope is used. This is a thin, flexible tube with a tiny camera on the end. Its inserted through the mouth and down into the stomach and duodenum.  · Upper gastrointestinal (GI) series. This is done to take X-rays of the upper GI tract from the mouth to the small bowel. For the test, a substance called barium is used. The barium coats the upper GI tract so that it will show up clearly on X-rays.  · Gastric emptying scan. This is done to measure how quickly food leaves the stomach. For the test, a meal containing a harmless radioactive substance (tracer) is eaten. Then scans of the stomach are done. The tracer shows up clearly on the scans and shows the movement of the food through the stomach.  · Antroduodenal manometry. This test gives pressure measurements of the stomach and small intestine to check how the contractions are working.  · Newer tests. These are being created and include breath tests and wireless capsule studies   Treating gastroparesis  The goal of treatment is to help you manage your condition. Treatment may include one or more of the following:  · Dietary changes. You may need to make changes to your eating habits and daily diet. For instance, your healthcare provider may instruct you to eat small meals throughout the day. Doing this can keep you from feeling full too quickly. You may be placed on a liquid or soft diet. This means youll eat liquid foods or foods that are mashed or put through a . In addition, you may need to avoid foods high in fats and fiber. These can slow digestion. For more help with your diet, your healthcare provider can refer you to a dietitian. In severe cases,  you may need a feeding tube. This sends liquid food or medicine directly to your small bowel, bypassing the stomach.  · Treating diabetes. If you are diabetic, it is important to control your blood sugar. High sugar levels worsen gastroparesis.   · Medicines. These can help manage symptoms, such as nausea and vomiting. They can also improve motility. Each medicine has specific risks and side effects. Your doctor can tell you more about any medicine that is prescribed for you.  · Surgery. You may need to have a tube surgically inserted into the stomach. The tube removes excess air and fluid. This can relieve severe symptoms of nausea and vomiting. In rare cases, other surgery may be needed on the stomach or small bowel. This is to create a new passageway for food to be emptied from the stomach.  · Gastric electrical stimulation. This treatment is done less often and may not be available. Your healthcare provider can tell you more about this treatment if it is a choice for you.  Diabetes and gastroparesis  If you have diabetes, gastroparesis can make it harder to manage your blood sugar level. Youll need to take extra steps in your treatment to prevent complications. Work with your healthcare provider to learn what you can do to protect your health. For more information, contact the American Diabetes Association, www.diabetes.org.   Long-term concerns   With treatment, most people can manage their symptoms and maintain their usual routines. If your symptoms are moderate to severe, you may need to see your healthcare provider more often for checkups. Also, other treatments will likely be needed.  Date Last Reviewed: 7/14/2016  © 4671-7528 [employer]. [address] [place] [address], [place] [address]. All rights reserved. This information is not intended as a substitute for professional medical care. Always follow your healthcare professional's instructions.        Anatomy of the Digestive System    Food gives  the body the energy needed for life. The digestive system breaks food down into basic nutrients that can be used by the body. The digestive tract is a long, muscular tube that extends from the mouth through the stomach and intestines to the anus. As food moves along the digestive tract, it is digested (changed into substances that can be absorbed into the bloodstream). Certain organs (such as the liver, gallbladder, and pancreas) help with this digestion. Parts of food that cannot be digested are turned into stool, which is waste material that is passed out of the body.  Digestive system  The digestive system is made up of the following:  · The mouth takes in food, breaks it into pieces, and begins the process of digestion.  · The esophagus moves food from the mouth to the stomach.  · The stomach breaks food down into a liquid mixture.  · The liver makes bile that helps digest fat.  · The gallbladder stores bile.  · The pancreas makes enzymes that help in digestion.  · The small intestine digests food further and absorbs nutrients. What is left is passed on to the colon as liquid waste.  · The large intestine (colon) absorbs water, salt, and minerals from the waste, forming a solid stool.  · The rectum stores stool until a bowel movement happens.  · The anus is the opening where stool leaves the body.  Date Last Reviewed: 3/14/2014  © 1302-9972 [employer]. [address] [place] [address], [place] [address]. All rights reserved. This information is not intended as a substitute for professional medical care. Always follow your healthcare professional's instructions.      Gastroparesis    S/P small bowel resection    Narcotic bowel syndrome    Small bowel tube feeding    Constipation, unspecified constipation type  -     linaclotide (LINZESS) 290 mcg Cap; Take 1 capsule (290 mcg total) by mouth once daily at 6am.  Dispense: 30 capsule; Refill: 10    Thanks for trusting us with your healthcare needs and using  [facility]. If you want to ask us a question, you can do so by replying to this message or by calling [phone].    Sincerely,    Pop Schwab M.D.

## 2017-07-17 NOTE — PROGRESS NOTES
Subjective:       Patient ID: Lia Avila is a 37 y.o. female.    Chief Complaint: GI Problem    Patient comes to see us today with chronic gastroparesis, narcotic bowel syndrome. She had a PEG placed which was used for venting stomach contents with a J-tube over the PEG for feeding purposes, but this is no longer the case, and she only has a PEG for venting purposes. She was seen by Dr. Encarnacion for consideration of gastric pacer, but she is probably not a candidate according to Dr. Encarnacion. She suffers with chronic constipation and is taking narcotics for chronic back pain. She has seen several motility specialists and prior to me she was seen by Dr. Argueta. I have reviewed her records.       GI Problem   The primary symptoms include abdominal pain, nausea and vomiting. Primary symptoms do not include fever, fatigue or dysuria. The onset was gradual.   The nausea is associated with eating, stress and prescription drugs. The nausea is exacerbated by food.   Vomiting occurs 2 to 5 times per day. The emesis contains stomach contents.   The illness is also significant for bloating, constipation and back pain. The illness does not include chills. Significant associated medical issues include GERD, bowel resection and irritable bowel syndrome. Associated medical issues do not include inflammatory bowel disease or alcohol abuse.     Past Medical History:   Diagnosis Date    Acid reflux     Aerophagia     Alcoholic     recovering    Anxiety     Depression     Functional gastrointestinal disorder     Irritable bowel syndrome      Past Surgical History:   Procedure Laterality Date    CHOLECYSTECTOMY      GASTROSTOMY-JEJEUNOSTOMY TUBE CHANGE/PLACEMENT       Current Outpatient Prescriptions on File Prior to Visit   Medication Sig Dispense Refill    apixaban (ELIQUIS) 5 mg Tab Take 1 tablet (5 mg total) by mouth 2 (two) times daily. 60 tablet 4    atorvastatin (LIPITOR) 20 MG tablet   1     dextroamphetamine-amphetamine (ADDERALL) 15 mg tablet Take 15 mg by mouth.      duloxetine (CYMBALTA) 60 MG capsule Take 90 mg by mouth once daily.      ergocalciferol (ERGOCALCIFEROL) 50,000 unit Cap Take 50,000 Units by mouth.      lorazepam (ATIVAN) 0.5 MG tablet Take 0.5 mg by mouth every 6 (six) hours as needed.      metformin (GLUCOPHAGE) 500 MG tablet Take 500 mg by mouth.      methylphenidate (RITALIN) 10 MG tablet Take 10 mg by mouth.      ondansetron (ZOFRAN) 4 MG tablet Take 4 mg by mouth.      quetiapine (SEROQUEL XR) 300 MG Tb24 Take 300 mg by mouth.      trazodone (DESYREL) 150 MG tablet Take 150 mg by mouth every evening.       No current facility-administered medications on file prior to visit.      Review of patient's allergies indicates:   Allergen Reactions    Sulfa (sulfonamide antibiotics)      Social History     Social History    Marital status:      Spouse name: N/A    Number of children: N/A    Years of education: N/A     Occupational History    Not on file.     Social History Main Topics    Smoking status: Former Smoker     Packs/day: 0.25     Quit date: 12/3/2016    Smokeless tobacco: Never Used    Alcohol use No      Comment: pt states that she is a recoveirng alcoholic, last drink 7-2-11    Drug use: No    Sexual activity: Not on file     Other Topics Concern    Not on file     Social History Narrative    No narrative on file     Family History   Problem Relation Age of Onset    Hypertension Mother     Cancer Father          Review of Systems   Constitutional: Negative for chills, fatigue, fever and unexpected weight change.   HENT: Negative for trouble swallowing.    Eyes: Negative for pain, discharge and itching.   Respiratory: Negative for apnea, chest tightness, shortness of breath and wheezing.    Cardiovascular: Negative for chest pain, palpitations and leg swelling.   Gastrointestinal: Positive for abdominal distention, abdominal pain, bloating,  constipation, nausea and vomiting.   Endocrine: Negative for cold intolerance, heat intolerance and polydipsia.   Genitourinary: Negative for dysuria and hematuria.   Musculoskeletal: Positive for back pain.   Neurological: Negative for dizziness, seizures and syncope.   Hematological: Negative for adenopathy. Does not bruise/bleed easily.   Psychiatric/Behavioral: Negative for agitation and behavioral problems.       Objective:      Physical Exam   Constitutional: She is oriented to person, place, and time. She appears well-developed and well-nourished.   HENT:   Head: Normocephalic and atraumatic.   Eyes: EOM are normal. Pupils are equal, round, and reactive to light.   Neck: Normal range of motion. Neck supple. No thyromegaly present.   Cardiovascular: Normal rate, regular rhythm, normal heart sounds and intact distal pulses.    No murmur heard.  Pulmonary/Chest: Effort normal and breath sounds normal. No respiratory distress. She has no wheezes. She has no rales.   Abdominal: Soft. Bowel sounds are normal. She exhibits no distension and no mass. There is no tenderness.   PEG tube in place. Scars from prior surgeries.    Musculoskeletal: Normal range of motion. She exhibits no edema or tenderness.   Lymphadenopathy:     She has no cervical adenopathy.   Neurological: She is alert and oriented to person, place, and time. She has normal reflexes. No cranial nerve deficit.   Skin: Skin is warm and dry. No erythema.   Psychiatric: She has a normal mood and affect. Her behavior is normal.   Vitals reviewed.      Assessment:       1. Gastroparesis    2. S/P small bowel resection    3. Narcotic bowel syndrome    4. Small bowel tube feeding    5. Constipation, unspecified constipation type        Plan:       Gastroparesis    S/P small bowel resection    Narcotic bowel syndrome    Small bowel tube feeding    Constipation, unspecified constipation type  -     linaclotide (LINZESS) 290 mcg Cap; Take 1 capsule (290 mcg  total) by mouth once daily at 6am.  Dispense: 30 capsule; Refill: 10    Lifestyle changes discussed with patient. Patient not a candidate for gastric pacer according to Dr. Encarnacion. Decrease the use of narcotics.       Pop Schwab

## 2017-07-17 NOTE — LETTER
July 17, 2017      Akanksha West MD  [address] [facility]  [address]  [facility] [address]           [facility]  [address] [place] [id number]  Phone: [phone]  Fax: [phone]          Patient: Lia Avila   MR Number: [id number]   YOB: 1980   Date of Visit: 7/17/2017       Dear Dr. Akanksha West:    Thank you for referring Lia Avila to me for evaluation. Attached you will find relevant portions of my assessment and plan of care.    If you have questions, please do not hesitate to call me. I look forward to following Lia Avila along with you.    Sincerely,    Pop Schwab MD    Enclosure  CC:  No Recipients    If you would like to receive this communication electronically, please contact [email or web address] or [phone] to request more information on [employer] Link access.    For providers and/or their staff who would like to refer a patient to [facility], please contact us through our one-stop-shop provider referral line, [facility], at [phone].    If you feel you have received this communication in error or would no longer like to receive these types of communications, please e-mail [email or web address]

## 2017-08-23 ENCOUNTER — HOSPITAL ENCOUNTER (EMERGENCY)
Facility: HOSPITAL | Age: 37
Discharge: HOME | End: 2017-08-23
Attending: EMERGENCY MEDICINE
Payer: COMMERCIAL

## 2017-08-23 VITALS
TEMPERATURE: 99 F | HEIGHT: 62 IN | BODY MASS INDEX: 27.6 KG/M2 | DIASTOLIC BLOOD PRESSURE: 70 MMHG | SYSTOLIC BLOOD PRESSURE: 139 MMHG | OXYGEN SATURATION: 99 % | WEIGHT: 150 LBS | RESPIRATION RATE: 18 BRPM | HEART RATE: 90 BPM

## 2017-08-23 DIAGNOSIS — K59.00 CONSTIPATION, UNSPECIFIED CONSTIPATION TYPE: Primary | ICD-10-CM

## 2017-08-23 DIAGNOSIS — R52 PAIN: ICD-10-CM

## 2017-08-23 LAB
ALBUMIN SERPL BCP-MCNC: 3.7 G/DL
ALP SERPL-CCNC: 50 U/L
ALT SERPL W/O P-5'-P-CCNC: 17 U/L
ANION GAP SERPL CALC-SCNC: 8 MMOL/L
AST SERPL-CCNC: 17 U/L
B-HCG UR QL: NEGATIVE
BASOPHILS # BLD AUTO: 0.02 K/UL
BASOPHILS NFR BLD: 0.4 %
BILIRUB SERPL-MCNC: 0.4 MG/DL
BILIRUB UR QL STRIP: NEGATIVE
BUN SERPL-MCNC: 11 MG/DL
CALCIUM SERPL-MCNC: 8.6 MG/DL
CHLORIDE SERPL-SCNC: 107 MMOL/L
CLARITY UR: CLEAR
CO2 SERPL-SCNC: 26 MMOL/L
COLOR UR: YELLOW
CREAT SERPL-MCNC: 0.8 MG/DL
DIFFERENTIAL METHOD: ABNORMAL
EOSINOPHIL # BLD AUTO: 0.1 K/UL
EOSINOPHIL NFR BLD: 0.9 %
ERYTHROCYTE [DISTWIDTH] IN BLOOD BY AUTOMATED COUNT: 14 %
EST. GFR  (AFRICAN AMERICAN): >60 ML/MIN/1.73 M^2
EST. GFR  (NON AFRICAN AMERICAN): >60 ML/MIN/1.73 M^2
GLUCOSE SERPL-MCNC: 82 MG/DL
GLUCOSE UR QL STRIP: NEGATIVE
HCT VFR BLD AUTO: 36.3 %
HGB BLD-MCNC: 12.1 G/DL
HGB UR QL STRIP: ABNORMAL
KETONES UR QL STRIP: NEGATIVE
LEUKOCYTE ESTERASE UR QL STRIP: NEGATIVE
LIPASE SERPL-CCNC: 13 U/L
LYMPHOCYTES # BLD AUTO: 1.7 K/UL
LYMPHOCYTES NFR BLD: 30.9 %
MCH RBC QN AUTO: 28.7 PG
MCHC RBC AUTO-ENTMCNC: 33.3 G/DL
MCV RBC AUTO: 86 FL
MICROSCOPIC COMMENT: NORMAL
MONOCYTES # BLD AUTO: 0.4 K/UL
MONOCYTES NFR BLD: 6.5 %
NEUTROPHILS # BLD AUTO: 3.3 K/UL
NEUTROPHILS NFR BLD: 61.3 %
NITRITE UR QL STRIP: NEGATIVE
PH UR STRIP: 6 [PH] (ref 5–8)
PLATELET # BLD AUTO: 221 K/UL
PMV BLD AUTO: 11.1 FL
POTASSIUM SERPL-SCNC: 4 MMOL/L
PROT SERPL-MCNC: 6.4 G/DL
PROT UR QL STRIP: NEGATIVE
RBC # BLD AUTO: 4.21 M/UL
RBC #/AREA URNS HPF: 3 /HPF (ref 0–4)
SODIUM SERPL-SCNC: 141 MMOL/L
SP GR UR STRIP: 1.02 (ref 1–1.03)
URN SPEC COLLECT METH UR: ABNORMAL
UROBILINOGEN UR STRIP-ACNC: NEGATIVE EU/DL
WBC # BLD AUTO: 5.4 K/UL
WBC #/AREA URNS HPF: 2 /HPF (ref 0–5)

## 2017-08-23 PROCEDURE — 83690 ASSAY OF LIPASE: CPT

## 2017-08-23 PROCEDURE — 81025 URINE PREGNANCY TEST: CPT

## 2017-08-23 PROCEDURE — 85025 COMPLETE CBC W/AUTO DIFF WBC: CPT

## 2017-08-23 PROCEDURE — 80053 COMPREHEN METABOLIC PANEL: CPT

## 2017-08-23 PROCEDURE — 81000 URINALYSIS NONAUTO W/SCOPE: CPT

## 2017-08-23 PROCEDURE — 96360 HYDRATION IV INFUSION INIT: CPT

## 2017-08-23 PROCEDURE — 99284 EMERGENCY DEPT VISIT MOD MDM: CPT | Mod: 25

## 2017-08-23 PROCEDURE — 25000003 PHARM REV CODE 250: Performed by: EMERGENCY MEDICINE

## 2017-08-23 RX ORDER — SYRING-NEEDL,DISP,INSUL,0.3 ML 29 G X1/2"
296 SYRINGE, EMPTY DISPOSABLE MISCELLANEOUS
Status: COMPLETED | OUTPATIENT
Start: 2017-08-23 | End: 2017-08-23

## 2017-08-23 RX ORDER — POLYETHYLENE GLYCOL 3350, SODIUM SULFATE ANHYDROUS, SODIUM BICARBONATE, SODIUM CHLORIDE, POTASSIUM CHLORIDE 236; 22.74; 6.74; 5.86; 2.97 G/4L; G/4L; G/4L; G/4L; G/4L
4 POWDER, FOR SOLUTION ORAL ONCE
Qty: 4000 ML | Refills: 0 | Status: SHIPPED | OUTPATIENT
Start: 2017-08-23 | End: 2017-08-23

## 2017-08-23 RX ADMIN — SODIUM CHLORIDE 1000 ML: 0.9 INJECTION, SOLUTION INTRAVENOUS at 04:08

## 2017-08-23 RX ADMIN — MAGNESIUM CITRATE 296 ML: 1.75 LIQUID ORAL at 04:08

## 2017-08-23 NOTE — ED PROVIDER NOTES
SCRIBE #1 NOTE: I, Corinne Mack, am scribing for, and in the presence of, Cesar Avendano Jr., MD. I have scribed the entire note.      History      Chief Complaint   Patient presents with    Abdominal Pain     abd pain that changed from normal gastroparessis. hx of SBO. rectal pain. last bm 6 days ago       Review of patient's allergies indicates:   Allergen Reactions    Sulfa (sulfonamide antibiotics)         HPI   HPI    8/23/2017, 2:39 PM   History obtained from the patient      History of Present Illness: Lia Avila is a 37 y.o. female patient who presents to the Emergency Department for abd pain which onset gradually 6 days ago. Symptoms are constant and moderate in severity. Pt's last BM was 6 days ago, she is passing gas. No mitigating or exacerbating factors reported. Associated sxs include constipation and nausea. Patient denies any fever, chills, CP, SOB, V/D, back pain, HA, dizziness, and all other sxs at this time. Prior Tx includes linzess. Pt has Hx of SBO and gastroparesis. No further complaints or concerns at this time.       Arrival mode: Personal vehicle      PCP: Akanksha West MD       Past Medical History:  Past Medical History:   Diagnosis Date    Acid reflux     Aerophagia     Alcoholic     recovering    Anxiety     Depression     Functional gastrointestinal disorder     Irritable bowel syndrome        Past Surgical History:  Past Surgical History:   Procedure Laterality Date    CHOLECYSTECTOMY      GASTROSTOMY-JEJEUNOSTOMY TUBE CHANGE/PLACEMENT           Family History:  Family History   Problem Relation Age of Onset    Hypertension Mother     Cancer Father        Social History:  Social History     Social History Main Topics    Smoking status: Former Smoker     Packs/day: 0.25     Quit date: 12/3/2016    Smokeless tobacco: Never Used    Alcohol use No      Comment: pt states that she is a recoveirng alcoholic, last drink 7-2-11    Drug use: No    Sexual  activity: Not on file       ROS   Review of Systems   Constitutional: Negative for chills and fever.   Respiratory: Negative for cough and shortness of breath.    Cardiovascular: Negative for chest pain and leg swelling.   Gastrointestinal: Positive for abdominal pain, constipation and nausea. Negative for diarrhea and vomiting.   Musculoskeletal: Negative for back pain, neck pain and neck stiffness.   Skin: Negative for rash and wound.   Neurological: Negative for dizziness, light-headedness, numbness and headaches.   All other systems reviewed and are negative.      Physical Exam      Initial Vitals [08/23/17 1336]   BP Pulse Resp Temp SpO2   (!) 149/65 95 18 99.2 °F (37.3 °C) 99 %      MAP       93          Physical Exam  Nursing Notes and Vital Signs Reviewed.  Constitutional: Patient is in no acute distress. Well-developed and well-nourished.  Head: Atraumatic. Normocephalic.  Eyes: PERRL. EOM intact. Conjunctivae are not pale. No scleral icterus.  ENT: Mucous membranes are moist. Oropharynx is clear and symmetric.    Neck: Supple. Full ROM. No lymphadenopathy.  Cardiovascular: Regular rate. Regular rhythm. No murmurs, rubs, or gallops. Distal pulses are 2+ and symmetric.  Pulmonary/Chest: No respiratory distress. Clear to auscultation bilaterally. No wheezing, rales, or rhonchi.  Abdominal: Soft and non-distended.  There is diffuse tenderness.  No rebound, guarding, or rigidity. G tube to abd wall.  Musculoskeletal: Moves all extremities. No obvious deformities. No edema. No calf tenderness.  Skin: Warm and dry.  Neurological:  Alert, awake, and appropriate.  Normal speech.  No acute focal neurological deficits are appreciated.  Psychiatric: Normal affect. Good eye contact. Appropriate in content.    ED Course    Procedures  ED Vital Signs:  Vitals:    08/23/17 1336 08/23/17 1530 08/23/17 1700   BP: (!) 149/65  139/70   Pulse: 95 90 90   Resp: 18  18   Temp: 99.2 °F (37.3 °C)  99 °F (37.2 °C)   TempSrc: Oral   "Oral   SpO2: 99%  99%   Weight: 68 kg (150 lb)     Height: 5' 2" (1.575 m)         Abnormal Lab Results:  Labs Reviewed   URINALYSIS - Abnormal; Notable for the following:        Result Value    Occult Blood UA 3+ (*)     All other components within normal limits   CBC W/ AUTO DIFFERENTIAL - Abnormal; Notable for the following:     Hematocrit 36.3 (*)     All other components within normal limits   COMPREHENSIVE METABOLIC PANEL - Abnormal; Notable for the following:     Calcium 8.6 (*)     Alkaline Phosphatase 50 (*)     All other components within normal limits   PREGNANCY TEST, URINE RAPID   URINALYSIS MICROSCOPIC   LIPASE        All Lab Results:  Results for orders placed or performed during the hospital encounter of 08/23/17   Urinalysis   Result Value Ref Range    Specimen UA Urine, Clean Catch     Color, UA Yellow Yellow, Straw, [place]    Appearance, UA Clear Clear    pH, UA 6.0 5.0 - 8.0    Specific Gravity, UA 1.020 1.005 - 1.030    Protein, UA Negative Negative    Glucose, UA Negative Negative    Ketones, UA Negative Negative    Bilirubin (UA) Negative Negative    Occult Blood UA 3+ (A) Negative    Nitrite, UA Negative Negative    Urobilinogen, UA Negative <2.0 EU/dL    Leukocytes, UA Negative Negative   Rapid Pregnancy, Urine   Result Value Ref Range    Preg Test, Ur Negative    Urinalysis Microscopic   Result Value Ref Range    RBC, UA 3 0 - 4 /hpf    WBC, UA 2 0 - 5 /hpf    Microscopic Comment SEE COMMENT    CBC auto differential   Result Value Ref Range    WBC 5.40 3.90 - 12.70 K/uL    RBC 4.21 4.00 - 5.40 M/uL    Hemoglobin 12.1 12.0 - 16.0 g/dL    Hematocrit 36.3 (L) 37.0 - 48.5 %    MCV 86 82 - 98 fL    MCH 28.7 27.0 - 31.0 pg    MCHC 33.3 32.0 - 36.0 g/dL    RDW 14.0 11.5 - 14.5 %    Platelets 221 150 - 350 K/uL    MPV 11.1 9.2 - 12.9 fL    Gran # 3.3 1.8 - 7.7 K/uL    Lymph # 1.7 1.0 - 4.8 K/uL    Mono # 0.4 0.3 - 1.0 K/uL    Eos # 0.1 0.0 - 0.5 K/uL    Baso # 0.02 0.00 - 0.20 K/uL    Gran% 61.3 " 38.0 - 73.0 %    Lymph% 30.9 18.0 - 48.0 %    Mono% 6.5 4.0 - 15.0 %    Eosinophil% 0.9 0.0 - 8.0 %    Basophil% 0.4 0.0 - 1.9 %    Differential Method Automated    Comprehensive metabolic panel   Result Value Ref Range    Sodium 141 136 - 145 mmol/L    Potassium 4.0 3.5 - 5.1 mmol/L    Chloride 107 95 - 110 mmol/L    CO2 26 23 - 29 mmol/L    Glucose 82 70 - 110 mg/dL    BUN, Bld 11 6 - 20 mg/dL    Creatinine 0.8 0.5 - 1.4 mg/dL    Calcium 8.6 (L) 8.7 - 10.5 mg/dL    Total Protein 6.4 6.0 - 8.4 g/dL    Albumin 3.7 3.5 - 5.2 g/dL    Total Bilirubin 0.4 0.1 - 1.0 mg/dL    Alkaline Phosphatase 50 (L) 55 - 135 U/L    AST 17 10 - 40 U/L    ALT 17 10 - 44 U/L    Anion Gap 8 8 - 16 mmol/L    eGFR if African American >60 >60 mL/min/1.73 m^2    eGFR if non African American >60 >60 mL/min/1.73 m^2   Lipase   Result Value Ref Range    Lipase 13 4 - 60 U/L       Imaging Results:  Imaging Results          X-Ray Abdomen Flat And Erect (Final result)  Result time 08/23/17 15:13:46    Final result by ROBERT Augustine Sr., MD (08/23/17 15:13:46)                 Impression:      1. There is a radiopaque tube projected over the abdomen. There are no dilated loops of bowel visualized.   2. There are surgical clips projected over the abdomen.   3. There is posterior spinal fusion hardware across the lumbosacral portion of the spine and the sacroiliac joints.        Electronically signed by: ROBERT AUGUSTINE MD  Date:     08/23/17  Time:    15:13              Narrative:    Flat and erect KUB    History: Pain, unspecified    Finding: Comparison was made to a prior examination performed on 2/1/2017. There is a radiopaque tube projected over the abdomen. There are no dilated loops of bowel visualized. There are surgical clips projected over the abdomen. There is no pneumoperitoneum. There is posterior spinal fusion hardware across the lumbosacral portion of the spine and the sacroiliac joints.                                      The Emergency  Provider reviewed the vital signs and test results, which are outlined above.    ED Discussion     2:59 PM: Dr. Avendano discussed the pt's case with Dr. Jeansonne (General Surgery) who recommends performing a CT scan if there are any abnormalities on the pt's x-ray.    3:45 PM: Dr. Jeansonne at pt bedside discussing pt f/u for SBO.    5:14 PM: Reassessed pt at this time. Pt is awake, alert, and in no distress. Discussed with pt all pertinent ED information and results. Discussed pt dx and plan of tx. Gave pt all f/u and return to the ED instructions. All questions and concerns were addressed at this time. Pt expresses understanding of information and instructions, and is comfortable with plan to discharge. Pt is stable for discharge.    I discussed with patient and/or family/caretaker that evaluation in the ED does not suggest any emergent or life threatening medical conditions requiring immediate intervention beyond what was provided in the ED, and I believe patient is safe for discharge.  Regardless, an unremarkable evaluation in the ED does not preclude the development or presence of a serious of life threatening condition. As such, patient was instructed to return immediately for any worsening or change in current symptoms.      ED Medication(s):  Medications   sodium chloride 0.9% bolus 1,000 mL (0 mLs Intravenous Stopped 8/23/17 1729)   magnesium citrate solution 296 mL (296 mLs Oral Given 8/23/17 1630)       Discharge Medication List as of 8/23/2017  5:14 PM      START taking these medications    Details   polyethylene glycol (GOLYTELY,NULYTELY) 236-22.74-6.74 -5.86 gram suspension Take 4,000 mLs (4 L total) by mouth once., Starting Wed 8/23/2017, Normal             Follow-up Information     Akanksha West MD. Call in 2 days.    Specialty:  Internal Medicine  Contact information:  [address]  [address]  [facility] [address] [phone]             Louis O. Jeansonne, MD. Call in 2 days.     Specialties:  General Surgery, Surgery  Contact information:  [address] [place] [address]  [phone]                     Medical Decision Making    Medical Decision Making:   Clinical Tests:   Lab Tests: Ordered and Reviewed  Radiological Study: Ordered and Reviewed           Scribe Attestation:   Scribe #1: I performed the above scribed service and the documentation accurately describes the services I performed. I attest to the accuracy of the note.    Attending:   Physician Attestation Statement for Scribe #1: I, Cesar Avendano Jr., MD, personally performed the services described in this documentation, as scribed by Corinne Mack, in my presence, and it is both accurate and complete.          Clinical Impression       ICD-10-CM ICD-9-CM   1. Constipation, unspecified constipation type K59.00 564.00   2. Pain R52 780.96       Disposition:   Disposition: Discharged  Condition: Stable         Cesar Avendano Jr., MD  08/23/17 1939

## 2017-12-20 ENCOUNTER — TELEPHONE (OUTPATIENT)
Dept: GASTROENTEROLOGY | Facility: CLINIC | Age: 37
End: 2017-12-20

## 2017-12-20 NOTE — TELEPHONE ENCOUNTER
----- Message from Loan Nobles MA sent at 12/20/2017 10:56 AM CST -----  Contact: Mobile: [phone]   Brad   Pt states  about 6 weeks ago her gastro doctor (Dr. Adalid Ferreira) called to refer her to Dr Salinas for gastroparesis, slow motility and H/O bowel resection in January,  Pt had a financial block on her account and was not able to make an appt, the account has been unblocked for about 4 weeks now so pt would like to try an obtain the appt.   Mobile: [phone]

## 2017-12-20 NOTE — TELEPHONE ENCOUNTER
Spoke with patient and advised patient our next available is April 2018.    Patient states the records were sent over 6 weeks ago and this is the first time I am seeing this.    Patient advised to see her current GI provider until she can come see Dr. Salinas and in the mean time I will get the records from Dr. Ferreira and [facility].    Patient verbalizes understanding.

## 2018-02-23 ENCOUNTER — TELEPHONE (OUTPATIENT)
Dept: GASTROENTEROLOGY | Facility: CLINIC | Age: 38
End: 2018-02-23

## 2018-02-23 NOTE — TELEPHONE ENCOUNTER
Spoke with patient and scheduled new patient appointment with Dr. Salinas.    Patient confirmed appointment.    Patient reports A1c in August was a 4.3 and now in February of 2018 her A1c is 12.7    Patients PCP diagnosed her with Type 2 Diabetes and Patient is taking Metformin.

## 2018-02-23 NOTE — TELEPHONE ENCOUNTER
Attempted to contact patient without success.    Left message to return call to the clinic to schedule new patient visit with Dr. Salinas.

## 2018-02-28 ENCOUNTER — PATIENT MESSAGE (OUTPATIENT)
Dept: GASTROENTEROLOGY | Facility: CLINIC | Age: 38
End: 2018-02-28
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
## 2018-03-26 ENCOUNTER — TELEPHONE (OUTPATIENT)
Dept: ENDOSCOPY | Facility: HOSPITAL | Age: 38
End: 2018-03-26

## 2018-03-26 ENCOUNTER — OFFICE VISIT (OUTPATIENT)
Dept: GASTROENTEROLOGY | Facility: CLINIC | Age: 38
End: 2018-03-26
Payer: COMMERCIAL

## 2018-03-26 VITALS
SYSTOLIC BLOOD PRESSURE: 124 MMHG | HEIGHT: 62 IN | RESPIRATION RATE: 20 BRPM | DIASTOLIC BLOOD PRESSURE: 77 MMHG | BODY MASS INDEX: 31.08 KG/M2 | HEART RATE: 99 BPM | WEIGHT: 168.88 LBS

## 2018-03-26 DIAGNOSIS — K59.00 CONSTIPATION, UNSPECIFIED CONSTIPATION TYPE: ICD-10-CM

## 2018-03-26 DIAGNOSIS — F41.9 ANXIETY AND DEPRESSION: ICD-10-CM

## 2018-03-26 DIAGNOSIS — F32.A ANXIETY AND DEPRESSION: ICD-10-CM

## 2018-03-26 DIAGNOSIS — R10.10 UPPER ABDOMINAL PAIN: ICD-10-CM

## 2018-03-26 DIAGNOSIS — R19.7 DIARRHEA, UNSPECIFIED TYPE: ICD-10-CM

## 2018-03-26 DIAGNOSIS — K21.9 GASTROESOPHAGEAL REFLUX DISEASE, ESOPHAGITIS PRESENCE NOT SPECIFIED: ICD-10-CM

## 2018-03-26 DIAGNOSIS — R07.9 CHEST PAIN, UNSPECIFIED TYPE: ICD-10-CM

## 2018-03-26 DIAGNOSIS — K62.5 BRBPR (BRIGHT RED BLOOD PER RECTUM): ICD-10-CM

## 2018-03-26 DIAGNOSIS — R11.2 NAUSEA AND VOMITING, INTRACTABILITY OF VOMITING NOT SPECIFIED, UNSPECIFIED VOMITING TYPE: Primary | ICD-10-CM

## 2018-03-26 DIAGNOSIS — Z87.19 HISTORY OF SMALL BOWEL OBSTRUCTION: ICD-10-CM

## 2018-03-26 DIAGNOSIS — R14.0 ABDOMINAL BLOATING: ICD-10-CM

## 2018-03-26 PROCEDURE — 99999 PR PBB SHADOW E&M-EST. PATIENT-LVL V: CPT | Mod: PBBFAC,,, | Performed by: INTERNAL MEDICINE

## 2018-03-26 PROCEDURE — 99245 OFF/OP CONSLTJ NEW/EST HI 55: CPT | Mod: S$GLB,,, | Performed by: INTERNAL MEDICINE

## 2018-03-26 RX ORDER — HYDROCODONE BITARTRATE AND ACETAMINOPHEN 10; 325 MG/1; MG/1
TABLET ORAL EVERY 6 HOURS PRN
COMMUNITY
End: 2019-01-21

## 2018-03-26 RX ORDER — DIAZEPAM 10 MG/1
10 TABLET ORAL
COMMUNITY
End: 2021-06-04

## 2018-03-26 RX ORDER — ESOMEPRAZOLE MAGNESIUM 40 MG/1
40 GRANULE, DELAYED RELEASE ORAL
Qty: 2400 MG | Refills: 1 | Status: SHIPPED | OUTPATIENT
Start: 2018-03-26 | End: 2018-07-31

## 2018-03-26 RX ORDER — PROMETHAZINE HYDROCHLORIDE 25 MG/1
25 TABLET ORAL
COMMUNITY
End: 2018-05-15

## 2018-03-26 NOTE — LETTER
March 26, 2018      Adalid Ferreira MD  [address]  [facility]  Gastroenterology Center  [facility] [address]           [facility]  [address]  [facility] [address]  Phone: [phone]  Fax: [phone]          Patient: Lia Avila   MR Number: [id number]   YOB: 1980   Date of Visit: 3/26/2018       Dear Dr. Adalid Ferreira:    Thank you for referring Lia Avila to me for evaluation. Attached you will find relevant portions of my assessment and plan of care.    If you have questions, please do not hesitate to call me. I look forward to following Lia Avila along with you.    Sincerely,    Apurva Salinas MD    Enclosure  CC:  No Recipients    If you would like to receive this communication electronically, please contact [email or web address] or [phone] to request more information on [employer] access.    For providers and/or their staff who would like to refer a patient to [facility], please contact us through our one-stop-shop provider referral line, [facility], at [phone].    If you feel you have received this communication in error or would no longer like to receive these types of communications, please e-mail [email or web address]

## 2018-03-26 NOTE — PATIENT INSTRUCTIONS
Eliminate lactose and artificial sweeteners for two weeks to see if this helps with abdominal bloating/distention.    Take metamucil for constipation and diarrhea. Start with a low dose and gradually increase the dose.

## 2018-03-28 ENCOUNTER — OFFICE VISIT (OUTPATIENT)
Dept: CARDIOLOGY | Facility: CLINIC | Age: 38
End: 2018-03-28
Payer: COMMERCIAL

## 2018-03-28 ENCOUNTER — PATIENT MESSAGE (OUTPATIENT)
Dept: ENDOSCOPY | Facility: HOSPITAL | Age: 38
End: 2018-03-28

## 2018-03-28 VITALS
HEART RATE: 85 BPM | SYSTOLIC BLOOD PRESSURE: 122 MMHG | DIASTOLIC BLOOD PRESSURE: 78 MMHG | BODY MASS INDEX: 30.95 KG/M2 | HEIGHT: 62 IN | WEIGHT: 168.19 LBS

## 2018-03-28 DIAGNOSIS — E78.2 HYPERLIPIDEMIA, MIXED: ICD-10-CM

## 2018-03-28 DIAGNOSIS — R07.9 CHEST PAIN: ICD-10-CM

## 2018-03-28 DIAGNOSIS — R07.89 ATYPICAL CHEST PAIN: ICD-10-CM

## 2018-03-28 PROCEDURE — 93000 ELECTROCARDIOGRAM COMPLETE: CPT | Mod: S$GLB,,, | Performed by: INTERNAL MEDICINE

## 2018-03-28 PROCEDURE — 99999 PR PBB SHADOW E&M-EST. PATIENT-LVL III: CPT | Mod: PBBFAC,,, | Performed by: INTERNAL MEDICINE

## 2018-03-28 PROCEDURE — 99244 OFF/OP CNSLTJ NEW/EST MOD 40: CPT | Mod: S$GLB,,, | Performed by: INTERNAL MEDICINE

## 2018-03-28 NOTE — LETTER
March 28, 2018      Imelda Balderas, TIMOTHY  [address]  [facility] [address]           [facility]  [address] [place] [address]  Phone: [phone]  Fax: [phone]          Patient: Lia Avila   MR Number: [id number]   YOB: 1980   Date of Visit: 3/28/2018       Dear Imelda Balderas:    Thank you for referring Lia Avila to me for evaluation. Attached you will find relevant portions of my assessment and plan of care.    If you have questions, please do not hesitate to call me. I look forward to following Lia Avila along with you.    Sincerely,    Ever Leon MD    Enclosure  CC:  No Recipients    If you would like to receive this communication electronically, please contact [email or web address] or [phone] to request more information on [employer] access.    For providers and/or their staff who would like to refer a patient to [facility], please contact us through our one-stop-shop provider referral line, [facility] , at [phone].    If you feel you have received this communication in error or would no longer like to receive these types of communications, please e-mail [email or web address]

## 2018-03-28 NOTE — PROGRESS NOTES
Subjective:    Patient ID:  Lia Avila is a 38 y.o. female who presents for evaluation of Chest Pain      Pt referred by Dr. Apurva Salinas      HPI pt referred for cp.  H/o insulin resistance, hyperlipidemia.  Nonsmoker. Former alcoholic.  No prior h/o CV disease.  H/o numerous GI problems, documented in epic chart, prolonged hospitalization last year, has g tube; ongoing gastric workup..  Has had cp sxs, was asked to get cardiac clearance for further GI workup with endoscopy.  ecg today is normal.  ecgs in past are normal, or unremarkable.  She has cp sxs over last 4 - 6 months. Atypical, lower chest, 30 seconds, resolves on its own.  Nonexertional.  She gets abdominal swelling, distention, and feels short of breath when this happens.      Current Outpatient Prescriptions:     atorvastatin (LIPITOR) 20 MG tablet, 20 mg once daily. , Disp: , Rfl: 1    dextroamphetamine-amphetamine (ADDERALL) 15 mg tablet, Take 60 mg by mouth once daily. 30 mg in morning, 20 mg afternoon, 10 mg  late evening, Disp: , Rfl:     diazePAM (VALIUM) 10 MG Tab, Take 10 mg by mouth as needed., Disp: , Rfl:     duloxetine (CYMBALTA) 60 MG capsule, Take 120 mg by mouth once daily. , Disp: , Rfl:     ERGOCALCIFEROL, VITAMIN D2, (VITAMIN D ORAL), Take 1,000 mg by mouth once daily., Disp: , Rfl:     esomeprazole (NEXIUM PACKET) 40 mg GrPS, Take 40 mg by mouth 2 (two) times daily before meals., Disp: 2400 mg, Rfl: 1    hydrocodone-acetaminophen 10-325mg (NORCO)  mg Tab, Take by mouth every 6 (six) hours as needed for Pain., Disp: , Rfl:     linaclotide (LINZESS) 290 mcg Cap, Take 1 capsule (290 mcg total) by mouth once daily at 6am., Disp: 30 capsule, Rfl: 10    promethazine (PHENERGAN) 25 MG tablet, Take 25 mg by mouth as needed for Nausea., Disp: , Rfl:     quetiapine (SEROQUEL XR) 300 MG Tb24, Take 200 mg by mouth. , Disp: , Rfl:     trazodone (DESYREL) 150 MG tablet, Take 50 mg by mouth every evening. , Disp: , Rfl:  "    metformin (GLUCOPHAGE) 500 MG tablet, Take 500 mg by mouth 2 (two) times daily with meals. , Disp: , Rfl:       Review of Systems   Constitution: Negative.   HENT: Negative.    Eyes: Negative.    Cardiovascular: Positive for chest pain.   Respiratory: Positive for shortness of breath.    Endocrine: Negative.    Hematologic/Lymphatic: Negative.    Skin: Negative.    Musculoskeletal: Negative.    Gastrointestinal: Positive for bloating, abdominal pain, change in bowel habit and heartburn.   Genitourinary: Negative.    Neurological: Negative.    Psychiatric/Behavioral: Negative.    Allergic/Immunologic: Negative.        /78 (BP Location: Left arm)   Pulse 85   Ht 5' 2" (1.575 m)   Wt 76.3 kg (168 lb 3.4 oz)   LMP 03/05/2018 (Exact Date)   BMI 30.77 kg/m²          Objective:    Physical Exam   Constitutional: She is oriented to person, place, and time. Vital signs are normal. She appears well-developed and well-nourished. She is active and cooperative. She does not have a sickly appearance. She does not appear ill. No distress.   HENT:   Head: Normocephalic.   Neck: Neck supple. Normal carotid pulses, no hepatojugular reflux and no JVD present. Carotid bruit is not present. No thyromegaly present.   Cardiovascular: Normal rate, regular rhythm, S1 normal, S2 normal, normal heart sounds and normal pulses.  PMI is not displaced.  Exam reveals no gallop and no friction rub.    No murmur heard.  Pulses:       Radial pulses are 2+ on the right side, and 2+ on the left side.   Pulmonary/Chest: Effort normal and breath sounds normal. She has no wheezes. She has no rales.   Abdominal: Soft. Normal appearance, normal aorta and bowel sounds are normal. She exhibits no pulsatile liver, no abdominal bruit, no ascites and no mass. There is generalized tenderness. There is no rebound and no guarding.   Musculoskeletal: She exhibits no edema.   Lymphadenopathy:     She has no cervical adenopathy.   Neurological: She is " alert and oriented to person, place, and time.   Skin: Skin is warm. She is not diaphoretic.   Psychiatric: She has a normal mood and affect. Her behavior is normal.   Nursing note and vitals reviewed.      I have reviewed all pertinent labs and cardiac studies.      Chemistry        Component Value Date/Time     08/23/2017 1619    K 4.0 08/23/2017 1619     08/23/2017 1619    CO2 26 08/23/2017 1619    BUN 11 08/23/2017 1619    CREATININE 0.8 08/23/2017 1619    GLU 82 08/23/2017 1619        Component Value Date/Time    CALCIUM 8.6 (L) 08/23/2017 1619    ALKPHOS 50 (L) 08/23/2017 1619    AST 17 08/23/2017 1619    ALT 17 08/23/2017 1619    BILITOT 0.4 08/23/2017 1619    ESTGFRAFRICA >60 08/23/2017 1619    EGFRNONAA >60 08/23/2017 1619        Lab Results   Component Value Date    WBC 5.40 08/23/2017    HGB 12.1 08/23/2017    HCT 36.3 (L) 08/23/2017    MCV 86 08/23/2017     08/23/2017     No results found for: CHOL  No results found for: HDL  No results found for: LDLCALC  Lab Results   Component Value Date    TRIG 106 01/10/2017     No results found for: CHOLHDL  Lab Results   Component Value Date    HGBA1C 4.6 01/02/2017           Assessment:       1. Atypical chest pain    2. Chest pain    3. Hyperlipidemia, mixed         Plan:             Atypical cp sxs, likely GI in nature or other non-cardiac etiology.  Continue f/u with GI.  Normal ECGs  10 year ASCVD risk is very low 0.4%  Will proceed with echocardiogram and treadmill stress test.  Cardiac diet  Needs daily exercise in future, 30 - 45 minutes aerobic exercise advised.  Phone review for test results.

## 2018-04-02 ENCOUNTER — TELEPHONE (OUTPATIENT)
Dept: ENDOSCOPY | Facility: HOSPITAL | Age: 38
End: 2018-04-02

## 2018-04-02 DIAGNOSIS — K59.00 CONSTIPATION, UNSPECIFIED CONSTIPATION TYPE: Primary | ICD-10-CM

## 2018-04-02 NOTE — TELEPHONE ENCOUNTER
Attempting to reach Lia x 2 to set up anal rectal manometry.  No answer, message left for pt to return call to clinic.    Cass Artner message sent as well.

## 2018-04-03 ENCOUNTER — HOSPITAL ENCOUNTER (OUTPATIENT)
Facility: HOSPITAL | Age: 38
Discharge: HOME | End: 2018-04-03
Attending: INTERNAL MEDICINE | Admitting: INTERNAL MEDICINE
Payer: COMMERCIAL

## 2018-04-03 ENCOUNTER — HOSPITAL ENCOUNTER (OUTPATIENT)
Dept: RADIOLOGY | Facility: HOSPITAL | Age: 38
Discharge: HOME | End: 2018-04-03
Attending: NURSE PRACTITIONER
Payer: COMMERCIAL

## 2018-04-03 VITALS
HEIGHT: 62 IN | HEART RATE: 74 BPM | TEMPERATURE: 98 F | RESPIRATION RATE: 16 BRPM | SYSTOLIC BLOOD PRESSURE: 114 MMHG | DIASTOLIC BLOOD PRESSURE: 59 MMHG | BODY MASS INDEX: 29.44 KG/M2 | OXYGEN SATURATION: 99 % | WEIGHT: 160 LBS

## 2018-04-03 DIAGNOSIS — R11.2 NAUSEA AND VOMITING, INTRACTABILITY OF VOMITING NOT SPECIFIED, UNSPECIFIED VOMITING TYPE: ICD-10-CM

## 2018-04-03 DIAGNOSIS — R19.7 DIARRHEA, UNSPECIFIED TYPE: Primary | ICD-10-CM

## 2018-04-03 DIAGNOSIS — R14.0 ABDOMINAL BLOATING: ICD-10-CM

## 2018-04-03 DIAGNOSIS — R10.10 UPPER ABDOMINAL PAIN: ICD-10-CM

## 2018-04-03 DIAGNOSIS — K59.00 CONSTIPATION, UNSPECIFIED CONSTIPATION TYPE: ICD-10-CM

## 2018-04-03 DIAGNOSIS — R19.7 DIARRHEA: ICD-10-CM

## 2018-04-03 PROCEDURE — 25500020 PHARM REV CODE 255: Performed by: NURSE PRACTITIONER

## 2018-04-03 PROCEDURE — 91122 HC ANORECTAL MANOMETRY: CPT | Performed by: INTERNAL MEDICINE

## 2018-04-03 PROCEDURE — 74177 CT ABD & PELVIS W/CONTRAST: CPT | Mod: TC

## 2018-04-03 PROCEDURE — 74177 CT ABD & PELVIS W/CONTRAST: CPT | Mod: 26,,, | Performed by: RADIOLOGY

## 2018-04-03 PROCEDURE — 91122 PR ANAL PRESSURE RECORD: CPT | Mod: 26,,, | Performed by: INTERNAL MEDICINE

## 2018-04-03 RX ORDER — SODIUM CHLORIDE 9 MG/ML
INJECTION, SOLUTION INTRAVENOUS CONTINUOUS
Status: DISCONTINUED | OUTPATIENT
Start: 2018-04-03 | End: 2018-04-03 | Stop reason: HOSPADM

## 2018-04-03 RX ADMIN — IOHEXOL 120 ML: 350 INJECTION, SOLUTION INTRAVENOUS at 05:04

## 2018-04-03 NOTE — TELEPHONE ENCOUNTER
Spoke to Ms. Avila    Notified her the appt with the dietitian will need to be cancelled for tomorrow due to the dietitian being ill. Ms Avila states she is scheduled to come in tomorrow for the ct scan and would really like to have ARM done tomorrow.    She explained she is a  and off this week.  Her mother also recently had a stroke and already has childcare for tomorrow.      Explained to Ms. Avila there is availability tomorrow for ARM, however this is not enough time to guarantee insurance will cover test.  Due to circumstances, Ms. Avila is willing to pay out of pocket for test if needed in order to have test done tomorrow due to schedule.      ARM order unable to be scheduled, new order placed    ARM scheduled for tomorrow at 1330.  Instructions and directions given.  Also instructed Ms. Avila to call first thing tomorrow morning to endo to ensure everything is good to go for ARM tomorrow.  She verbalizes understanding of all of this and is very appreciative of efforts.

## 2018-04-04 ENCOUNTER — CLINICAL SUPPORT (OUTPATIENT)
Dept: CARDIOLOGY | Facility: CLINIC | Age: 38
End: 2018-04-04
Attending: INTERNAL MEDICINE
Payer: COMMERCIAL

## 2018-04-04 ENCOUNTER — DOCUMENTATION ONLY (OUTPATIENT)
Dept: CARDIOLOGY | Facility: CLINIC | Age: 38
End: 2018-04-04

## 2018-04-04 DIAGNOSIS — R07.89 ATYPICAL CHEST PAIN: ICD-10-CM

## 2018-04-04 LAB
DIASTOLIC DYSFUNCTION: NO
DIASTOLIC DYSFUNCTION: NO
ESTIMATED PA SYSTOLIC PRESSURE: 15.67
RETIRED EF AND QEF - SEE NOTES: 60 (ref 55–65)

## 2018-04-04 PROCEDURE — 93306 TTE W/DOPPLER COMPLETE: CPT | Mod: S$GLB,,, | Performed by: INTERNAL MEDICINE

## 2018-04-04 PROCEDURE — 93015 CV STRESS TEST SUPVJ I&R: CPT | Mod: S$GLB,,, | Performed by: NUCLEAR MEDICINE

## 2018-04-04 NOTE — PROGRESS NOTES
Pt presented for an echocardiogram with bubble study per Dr. Leon.  Procedure was explained to the patient, She verbalized understanding.  IV, 24ga x 4 attempt, was started in the right wrist using aseptic technique.  Patient tolerated the procedure well.  IV discontinued, pressure dressing applied.

## 2018-04-08 ENCOUNTER — TELEPHONE (OUTPATIENT)
Dept: CARDIOLOGY | Facility: HOSPITAL | Age: 38
End: 2018-04-08

## 2018-04-08 ENCOUNTER — TELEPHONE (OUTPATIENT)
Dept: GASTROENTEROLOGY | Facility: CLINIC | Age: 38
End: 2018-04-08

## 2018-04-08 DIAGNOSIS — R11.2 NAUSEA AND VOMITING, INTRACTABILITY OF VOMITING NOT SPECIFIED, UNSPECIFIED VOMITING TYPE: Primary | ICD-10-CM

## 2018-04-08 NOTE — TELEPHONE ENCOUNTER
CT   1.  No abnormal bowel wall enhancement to suggest active inflammatory process.  No bowel obstruction.  No evidence for strictures.  2. Pulmonary opacities as described above, largest 1 measures 0.9 cm.  For a solid nodule >8 mm, Fleischner Society 2017 guidelines recommend considering CT, PET/CT or tissue sampling at 3 months.  3. Interval resolution of previously described pelvic fluid collection.  4. Diastasis of the anterior abdominal wall.  5. See body of report for details and other incidental findings.    Pls let pt know that Ct shows a very small nodule in her lung that appears to have decreased in size, which makes it unlikely that it is cancer. She should follow up with her PCP to determine if repeat imaging or any other evaluation is needed.   Otherwise Ct is negative.   pls let her know that we are going to order a smart pill to evaluate the motility of her gi tract.

## 2018-04-08 NOTE — TELEPHONE ENCOUNTER
Please call pt  She passed her stress test.   No blockages noted.  Echo shows normal heart strength.  There is evidence of possible shunt/PFO noted on study.  Recommend pt have KARMEN for more definitive evaluation since she has not been feeling well.  Please scheduled at pt convenience with Dr. Ricks or Dr. Mayo.    Thanks    Dr Leon

## 2018-04-09 ENCOUNTER — PATIENT MESSAGE (OUTPATIENT)
Dept: ENDOSCOPY | Facility: HOSPITAL | Age: 38
End: 2018-04-09

## 2018-04-09 ENCOUNTER — PATIENT MESSAGE (OUTPATIENT)
Dept: CARDIOLOGY | Facility: CLINIC | Age: 38
End: 2018-04-09

## 2018-04-10 ENCOUNTER — PATIENT MESSAGE (OUTPATIENT)
Dept: PULMONOLOGY | Facility: CLINIC | Age: 38
End: 2018-04-10

## 2018-04-10 ENCOUNTER — TELEPHONE (OUTPATIENT)
Dept: ENDOSCOPY | Facility: HOSPITAL | Age: 38
End: 2018-04-10

## 2018-04-10 DIAGNOSIS — Z12.11 SPECIAL SCREENING FOR MALIGNANT NEOPLASMS, COLON: Primary | ICD-10-CM

## 2018-04-10 RX ORDER — SODIUM, POTASSIUM,MAG SULFATES 17.5-3.13G
1 SOLUTION, RECONSTITUTED, ORAL ORAL ONCE
Qty: 1 BOTTLE | Refills: 0 | Status: SHIPPED | OUTPATIENT
Start: 2018-04-10 | End: 2018-04-10

## 2018-04-10 NOTE — TELEPHONE ENCOUNTER
Telephoned patient to give results and schedule KARMEN left voice mail message for return call.  Will release to patient portal with information and await patient's call

## 2018-04-10 NOTE — TELEPHONE ENCOUNTER
Patient returned phone call and received all results and recommendations.  Checking to make sure she can proceed with Gastro work up (EGD/colonoscopy) scheduled for Friday 4/13/18 has been prepping for over 2 weeks.   She apparently has an extensive history of GI problems, gastric paresis, bowel resections and has waited and worked hard to get scheduled with Dr. Salinas in [place].  Scheduling KARMEN for 4/27/18 with Dr. Rambo Leon please advise okay to proceed with planned on 4/13/18

## 2018-04-12 DIAGNOSIS — R91.1 LUNG NODULE: ICD-10-CM

## 2018-04-12 DIAGNOSIS — R91.1 LUNG NODULE SEEN ON IMAGING STUDY: Primary | ICD-10-CM

## 2018-04-13 ENCOUNTER — ANESTHESIA EVENT (OUTPATIENT)
Dept: ENDOSCOPY | Facility: HOSPITAL | Age: 38
End: 2018-04-13
Payer: COMMERCIAL

## 2018-04-13 ENCOUNTER — TELEPHONE (OUTPATIENT)
Dept: GASTROENTEROLOGY | Facility: CLINIC | Age: 38
End: 2018-04-13

## 2018-04-13 ENCOUNTER — SURGERY (OUTPATIENT)
Age: 38
End: 2018-04-13

## 2018-04-13 ENCOUNTER — ANESTHESIA (OUTPATIENT)
Dept: ENDOSCOPY | Facility: HOSPITAL | Age: 38
End: 2018-04-13
Payer: COMMERCIAL

## 2018-04-13 ENCOUNTER — HOSPITAL ENCOUNTER (OUTPATIENT)
Facility: HOSPITAL | Age: 38
Discharge: HOME | End: 2018-04-13
Attending: INTERNAL MEDICINE | Admitting: INTERNAL MEDICINE
Payer: COMMERCIAL

## 2018-04-13 VITALS
HEART RATE: 85 BPM | RESPIRATION RATE: 16 BRPM | HEIGHT: 62 IN | OXYGEN SATURATION: 98 % | BODY MASS INDEX: 29.81 KG/M2 | SYSTOLIC BLOOD PRESSURE: 106 MMHG | WEIGHT: 162 LBS | TEMPERATURE: 99 F | DIASTOLIC BLOOD PRESSURE: 59 MMHG

## 2018-04-13 DIAGNOSIS — R19.7 DIARRHEA, UNSPECIFIED TYPE: Primary | ICD-10-CM

## 2018-04-13 DIAGNOSIS — R19.7 DIARRHEA: ICD-10-CM

## 2018-04-13 DIAGNOSIS — M62.89 PELVIC FLOOR DYSFUNCTION: Primary | ICD-10-CM

## 2018-04-13 LAB
B-HCG UR QL: NEGATIVE
CTP QC/QA: YES

## 2018-04-13 PROCEDURE — 25000003 PHARM REV CODE 250: Performed by: INTERNAL MEDICINE

## 2018-04-13 PROCEDURE — 63600175 PHARM REV CODE 636 W HCPCS: Performed by: NURSE ANESTHETIST, CERTIFIED REGISTERED

## 2018-04-13 PROCEDURE — 37000008 HC ANESTHESIA 1ST 15 MINUTES: Performed by: INTERNAL MEDICINE

## 2018-04-13 PROCEDURE — 25000003 PHARM REV CODE 250: Performed by: NURSE ANESTHETIST, CERTIFIED REGISTERED

## 2018-04-13 PROCEDURE — D9220A PRA ANESTHESIA: Mod: CRNA,,, | Performed by: NURSE ANESTHETIST, CERTIFIED REGISTERED

## 2018-04-13 PROCEDURE — 81025 URINE PREGNANCY TEST: CPT | Performed by: INTERNAL MEDICINE

## 2018-04-13 PROCEDURE — 43239 EGD BIOPSY SINGLE/MULTIPLE: CPT | Mod: 51,,, | Performed by: INTERNAL MEDICINE

## 2018-04-13 PROCEDURE — 37000009 HC ANESTHESIA EA ADD 15 MINS: Performed by: INTERNAL MEDICINE

## 2018-04-13 PROCEDURE — 27201012 HC FORCEPS, HOT/COLD, DISP: Performed by: INTERNAL MEDICINE

## 2018-04-13 PROCEDURE — D9220A PRA ANESTHESIA: Mod: ANES,,, | Performed by: ANESTHESIOLOGY

## 2018-04-13 PROCEDURE — 45380 COLONOSCOPY AND BIOPSY: CPT | Performed by: INTERNAL MEDICINE

## 2018-04-13 PROCEDURE — 43239 EGD BIOPSY SINGLE/MULTIPLE: CPT | Performed by: INTERNAL MEDICINE

## 2018-04-13 PROCEDURE — 88305 TISSUE EXAM BY PATHOLOGIST: CPT | Performed by: PATHOLOGY

## 2018-04-13 PROCEDURE — 88342 IMHCHEM/IMCYTCHM 1ST ANTB: CPT | Mod: 26,,, | Performed by: PATHOLOGY

## 2018-04-13 PROCEDURE — 45380 COLONOSCOPY AND BIOPSY: CPT | Mod: ,,, | Performed by: INTERNAL MEDICINE

## 2018-04-13 RX ORDER — SODIUM CHLORIDE 9 MG/ML
INJECTION, SOLUTION INTRAVENOUS CONTINUOUS
Status: DISCONTINUED | OUTPATIENT
Start: 2018-04-13 | End: 2018-04-13 | Stop reason: HOSPADM

## 2018-04-13 RX ORDER — GLYCOPYRROLATE 0.2 MG/ML
INJECTION INTRAMUSCULAR; INTRAVENOUS
Status: DISCONTINUED | OUTPATIENT
Start: 2018-04-13 | End: 2018-04-13

## 2018-04-13 RX ORDER — PROPOFOL 10 MG/ML
VIAL (ML) INTRAVENOUS
Status: DISCONTINUED | OUTPATIENT
Start: 2018-04-13 | End: 2018-04-13

## 2018-04-13 RX ORDER — PROPOFOL 10 MG/ML
VIAL (ML) INTRAVENOUS CONTINUOUS PRN
Status: DISCONTINUED | OUTPATIENT
Start: 2018-04-13 | End: 2018-04-13

## 2018-04-13 RX ORDER — LIDOCAINE HCL/PF 100 MG/5ML
SYRINGE (ML) INTRAVENOUS
Status: DISCONTINUED | OUTPATIENT
Start: 2018-04-13 | End: 2018-04-13

## 2018-04-13 RX ADMIN — LIDOCAINE HYDROCHLORIDE 100 MG: 20 INJECTION, SOLUTION INTRAVENOUS at 02:04

## 2018-04-13 RX ADMIN — PROPOFOL 50 MG: 10 INJECTION, EMULSION INTRAVENOUS at 02:04

## 2018-04-13 RX ADMIN — SODIUM CHLORIDE: 9 INJECTION, SOLUTION INTRAVENOUS at 02:04

## 2018-04-13 RX ADMIN — PROPOFOL 150 MCG/KG/MIN: 10 INJECTION, EMULSION INTRAVENOUS at 02:04

## 2018-04-13 RX ADMIN — GLYCOPYRROLATE 0.2 MG: 0.2 INJECTION, SOLUTION INTRAMUSCULAR; INTRAVENOUS at 02:04

## 2018-04-13 RX ADMIN — SODIUM CHLORIDE: 9 INJECTION, SOLUTION INTRAVENOUS at 03:04

## 2018-04-13 RX ADMIN — PROPOFOL 100 MG: 10 INJECTION, EMULSION INTRAVENOUS at 02:04

## 2018-04-13 NOTE — TRANSFER OF CARE
"Anesthesia Transfer of Care Note    Patient: Lia Avila    Procedure(s) Performed: Procedure(s) (LRB):  ESOPHAGOGASTRODUODENOSCOPY (EGD) (N/A)  Colonoscopy (N/A)    Patient location: PACU    Anesthesia Type: general    Transport from OR: Transported from OR on room air with adequate spontaneous ventilation    Post pain: adequate analgesia    Post assessment: no apparent anesthetic complications and tolerated procedure well    Post vital signs: stable    Level of consciousness: awake, alert and oriented    Nausea/Vomiting: no nausea/vomiting    Complications: none    Transfer of care protocol was followed      Last vitals:   Visit Vitals  BP (!) 118/56 (BP Location: Left arm, Patient Position: Lying)   Pulse 89   Temp 37.1 °C (98.8 °F) (Oral)   Resp 17   Ht 5' 2" (1.575 m)   Wt 73.5 kg (162 lb)   SpO2 (!) 93%   Breastfeeding? No   BMI 29.63 kg/m²     "

## 2018-04-13 NOTE — ANESTHESIA POSTPROCEDURE EVALUATION
"Anesthesia Post Evaluation    Patient: Lia Avila    Procedure(s) Performed: Procedure(s) (LRB):  ESOPHAGOGASTRODUODENOSCOPY (EGD) (N/A)  Colonoscopy (N/A)    Final Anesthesia Type: general  Patient location during evaluation: PACU  Patient participation: Yes- Able to Participate  Level of consciousness: awake and alert  Post-procedure vital signs: reviewed and stable  Pain management: adequate  Airway patency: patent  PONV status at discharge: No PONV  Anesthetic complications: no      Cardiovascular status: hemodynamically stable  Respiratory status: unassisted  Hydration status: euvolemic  Follow-up not needed.        Visit Vitals  BP (!) 102/53 (BP Location: Left arm, Patient Position: Lying)   Pulse 83   Temp 37 °C (98.6 °F) (Temporal)   Resp 16   Ht 5' 2" (1.575 m)   Wt 73.5 kg (162 lb)   SpO2 99%   Breastfeeding? No   BMI 29.63 kg/m²       Pain/Matt Score: Pain Assessment Performed: Yes (4/13/2018  2:06 PM)  Presence of Pain: denies (4/13/2018  3:16 PM)  Pain Rating Prior to Med Admin: 5 (4/13/2018  2:06 PM)  Matt Score: 10 (4/13/2018  3:15 PM)      "

## 2018-04-13 NOTE — ANESTHESIA PREPROCEDURE EVALUATION
04/13/2018  Lia Avila is a 38 y.o., female presenting for EGD/colonscopy.  Hx of some recent atypical chest pain, cardiac workup has been normal including stress test and echo.    Past Medical History:   Diagnosis Date    Acid reflux     Aerophagia     Alcoholic     recovering    Anxiety     Depression     Functional gastrointestinal disorder     IBS (irritable bowel syndrome)     Irritable bowel syndrome      Past Surgical History:   Procedure Laterality Date    BACK SURGERY      fusion of L4, L5, and S1    CHOLECYSTECTOMY      COLON SURGERY      COLONOSCOPY      GASTROSTOMY-JEJEUNOSTOMY TUBE CHANGE/PLACEMENT      SMALL INTESTINE SURGERY      UPPER GASTROINTESTINAL ENDOSCOPY       Review of patient's allergies indicates:   Allergen Reactions    Sulfa (sulfonamide antibiotics)      No current facility-administered medications on file prior to encounter.      Current Outpatient Prescriptions on File Prior to Encounter   Medication Sig Dispense Refill    atorvastatin (LIPITOR) 20 MG tablet 20 mg once daily.   1    dextroamphetamine-amphetamine (ADDERALL) 15 mg tablet Take 60 mg by mouth once daily. 30 mg in morning, 20 mg afternoon, 10 mg  late evening      diazePAM (VALIUM) 10 MG Tab Take 10 mg by mouth as needed.      duloxetine (CYMBALTA) 60 MG capsule Take 120 mg by mouth once daily.       ERGOCALCIFEROL, VITAMIN D2, (VITAMIN D ORAL) Take 1,000 mg by mouth once daily.      esomeprazole (NEXIUM PACKET) 40 mg GrPS Take 40 mg by mouth 2 (two) times daily before meals. 2400 mg 1    hydrocodone-acetaminophen 10-325mg (NORCO)  mg Tab Take by mouth every 6 (six) hours as needed for Pain.      linaclotide (LINZESS) 290 mcg Cap Take 1 capsule (290 mcg total) by mouth once daily at 6am. 30 capsule 10    metformin (GLUCOPHAGE) 500 MG tablet Take 500 mg by mouth 2 (two) times  daily with meals.       promethazine (PHENERGAN) 25 MG tablet Take 25 mg by mouth as needed for Nausea.      quetiapine (SEROQUEL XR) 300 MG Tb24 Take 200 mg by mouth.       trazodone (DESYREL) 150 MG tablet Take 50 mg by mouth every evening.        Lab Results   Component Value Date    WBC 5.40 08/23/2017    HGB 12.1 08/23/2017    HCT 36.3 (L) 08/23/2017    MCV 86 08/23/2017     08/23/2017     BMP  Lab Results   Component Value Date     08/23/2017    K 4.0 08/23/2017     08/23/2017    CO2 26 08/23/2017    BUN 11 08/23/2017    CREATININE 0.8 08/23/2017    CALCIUM 8.6 (L) 08/23/2017    ANIONGAP 8 08/23/2017    ESTGFRAFRICA >60 08/23/2017    EGFRNONAA >60 08/23/2017         Anesthesia Evaluation    I have reviewed the Patient Summary Reports.     I have reviewed the Medications.     Review of Systems  Anesthesia Hx:  No problems with previous Anesthesia   Denies Personal Hx of Anesthesia complications.   Cardiovascular:   Exercise tolerance: good PFO noted on previous echo   Pulmonary:   Pleural effusions   Renal/:  Renal/ Normal     Hepatic/GI:   GERD    Neurological:  Neurology Normal        Physical Exam  General:  Well nourished    Airway/Jaw/Neck:  Airway Findings: Mouth Opening: Normal Tongue: Normal  General Airway Assessment: Adult  Mallampati: II  TM Distance: Normal, at least 6 cm  Jaw/Neck Findings:  Neck ROM: Normal ROM      Dental:  Dental Findings: In tact        Mental Status:  Mental Status Findings:  Cooperative, Alert and Oriented         Anesthesia Plan  Type of Anesthesia, risks & benefits discussed:  Anesthesia Type:  general  Patient's Preference:   Intra-op Monitoring Plan: standard ASA monitors  Intra-op Monitoring Plan Comments:   Post Op Pain Control Plan: IV/PO Opioids PRN  Post Op Pain Control Plan Comments:   Induction:   IV  Beta Blocker:  Patient is not currently on a Beta-Blocker (No further documentation required).       Informed Consent: Patient understands  risks and agrees with Anesthesia plan.  Questions answered. Anesthesia consent signed with patient.  ASA Score: 2     Day of Surgery Review of History & Physical:    H&P update referred to the surgeon.     Anesthesia Plan Notes: Bubble precautions due to R to L shunt        Ready For Surgery From Anesthesia Perspective.

## 2018-04-13 NOTE — PROVATION PATIENT INSTRUCTIONS
Discharge Summary/Instructions after an Endoscopic Procedure  Patient Name: Lia Avila  Patient MRN: [id number]  Patient YOB: 1980 Friday, April 13, 2018  Apurva Salinas MD  RESTRICTIONS:  During your procedure today, you received medications for sedation.  These   medications may affect your judgment, balance and coordination.  Therefore,   for 24 hours, you have the following restrictions:   - DO NOT drive a car, operate machinery, make legal/financial decisions,   sign important papers or drink alcohol.    ACTIVITY:  The following day: return to full activity including work, except no heavy   lifting, straining or running for 3 days if polyps were removed.  DIET:  Eat and drink normally unless instructed otherwise.     TREATMENT FOR COMMON SIDE EFFECTS:  - Mild abdominal pain, nausea, belching, bloating or excessive gas:  rest,   eat lightly and use a heating pad.  - Sore Throat: treat with throat lozenges and/or gargle with warm salt   water.  - Because air was used during the procedure, expelling large amounts of air   from your rectum or belching is normal.  - If a bowel prep was taken, you may not have a bowel movement for 1-3 days.    This is normal.  SYMPTOMS TO WATCH FOR AND REPORT TO YOUR PHYSICIAN:  1. Abdominal pain or bloating, other than gas cramps.  2. Chest pain.  3. Back pain.  4. Signs of infection such as: chills or fever occurring within 24 hours   after the procedure.  5. Rectal bleeding, which would show as bright red, maroon, or black stools.   (A tablespoon of blood from the rectum is not serious, especially if   hemorrhoids are present.)  6. Vomiting.  7. Weakness or dizziness.  GO DIRECTLY TO THE NEAREST EMERGENCY ROOM IF YOU HAVE ANY OF THE FOLLOWING:      Difficulty breathing              Chills and/or fever over 101 F   Persistent vomiting and/or vomiting blood   Severe abdominal pain   Severe chest pain   Black, tarry stools   Bleeding- more than one tablespoon   Any  other symptom or condition that you feel may need urgent attention  Your doctor recommends these additional instructions:  If any biopsies were taken, your doctors clinic will contact you in 1 to 2   weeks with any results.  - Await pathology results.   - Discharge patient to home (with escort).   - Resume previous diet.   - Continue present medications.   - Return to my office as previously scheduled.   - The findings and recommendations were discussed with the patient.   - Patient has a contact number available for emergencies.  The signs and   symptoms of potential delayed complications were discussed with the   patient.  Return to normal activities tomorrow.  Written discharge   instructions were provided to the patient.   For questions, problems or results please call your physician - Apurva Salinas MD at Work:  [phone].  [facility], EMERGENCY ROOM PHONE NUMBER: [phone]  IF A COMPLICATION OR EMERGENCY SITUATION ARISES AND YOU ARE UNABLE TO REACH   YOUR PHYSICIAN - GO DIRECTLY TO THE EMERGENCY ROOM.  Apurva Salinas MD  4/13/2018 2:40:01 PM  This report has been verified and signed electronically.

## 2018-04-13 NOTE — PROVATION PATIENT INSTRUCTIONS
Discharge Summary/Instructions after an Endoscopic Procedure  Patient Name: Lia Avila  Patient MRN: [id number]  Patient YOB: 1980 Friday, April 13, 2018  Apurva Salinas MD  RESTRICTIONS:  During your procedure today, you received medications for sedation.  These   medications may affect your judgment, balance and coordination.  Therefore,   for 24 hours, you have the following restrictions:   - DO NOT drive a car, operate machinery, make legal/financial decisions,   sign important papers or drink alcohol.    ACTIVITY:  The following day: return to full activity including work, except no heavy   lifting, straining or running for 3 days if polyps were removed.  DIET:  Eat and drink normally unless instructed otherwise.     TREATMENT FOR COMMON SIDE EFFECTS:  - Mild abdominal pain, nausea, belching, bloating or excessive gas:  rest,   eat lightly and use a heating pad.  - Sore Throat: treat with throat lozenges and/or gargle with warm salt   water.  - Because air was used during the procedure, expelling large amounts of air   from your rectum or belching is normal.  - If a bowel prep was taken, you may not have a bowel movement for 1-3 days.    This is normal.  SYMPTOMS TO WATCH FOR AND REPORT TO YOUR PHYSICIAN:  1. Abdominal pain or bloating, other than gas cramps.  2. Chest pain.  3. Back pain.  4. Signs of infection such as: chills or fever occurring within 24 hours   after the procedure.  5. Rectal bleeding, which would show as bright red, maroon, or black stools.   (A tablespoon of blood from the rectum is not serious, especially if   hemorrhoids are present.)  6. Vomiting.  7. Weakness or dizziness.  GO DIRECTLY TO THE NEAREST EMERGENCY ROOM IF YOU HAVE ANY OF THE FOLLOWING:      Difficulty breathing              Chills and/or fever over 101 F   Persistent vomiting and/or vomiting blood   Severe abdominal pain   Severe chest pain   Black, tarry stools   Bleeding- more than one tablespoon   Any  other symptom or condition that you feel may need urgent attention  Your doctor recommends these additional instructions:  If any biopsies were taken, your doctors clinic will contact you in 1 to 2   weeks with any results.  - Discharge patient to home (with escort).   - Resume previous diet.   - Continue present medications.   - Await pathology results.   - Repeat colonoscopy at age 48 for screening purposes.   - Patient has a contact number available for emergencies.  The signs and   symptoms of potential delayed complications were discussed with the   patient.  Return to normal activities tomorrow.  Written discharge   instructions were provided to the patient.   For questions, problems or results please call your physician - Apurva Salinas MD at Work:  [phone].  [facility], EMERGENCY ROOM PHONE NUMBER: [phone]  IF A COMPLICATION OR EMERGENCY SITUATION ARISES AND YOU ARE UNABLE TO REACH   YOUR PHYSICIAN - GO DIRECTLY TO THE EMERGENCY ROOM.  Apurva Salinas MD  4/13/2018 3:31:59 PM  This report has been verified and signed electronically.

## 2018-04-13 NOTE — PROVATION PATIENT INSTRUCTIONS
Discharge Summary/Instructions after an Endoscopic Procedure  Patient Name: Lia Avila  Patient MRN: [id number]  Patient YOB: 1980 Tuesday, April 03, 2018  Apurva Salinas MD  RESTRICTIONS:  During your procedure today, you received medications for sedation.  These   medications may affect your judgment, balance and coordination.  Therefore,   for 24 hours, you have the following restrictions:   - DO NOT drive a car, operate machinery, make legal/financial decisions,   sign important papers or drink alcohol.    ACTIVITY:  The following day: return to full activity including work, except no heavy   lifting, straining or running for 3 days if polyps were removed.  DIET:  Eat and drink normally unless instructed otherwise.     TREATMENT FOR COMMON SIDE EFFECTS:  - Mild abdominal pain, nausea, belching, bloating or excessive gas:  rest,   eat lightly and use a heating pad.  - Sore Throat: treat with throat lozenges and/or gargle with warm salt   water.  - Because air was used during the procedure, expelling large amounts of air   from your rectum or belching is normal.  - If a bowel prep was taken, you may not have a bowel movement for 1-3 days.    This is normal.  SYMPTOMS TO WATCH FOR AND REPORT TO YOUR PHYSICIAN:  1. Abdominal pain or bloating, other than gas cramps.  2. Chest pain.  3. Back pain.  4. Signs of infection such as: chills or fever occurring within 24 hours   after the procedure.  5. Rectal bleeding, which would show as bright red, maroon, or black stools.   (A tablespoon of blood from the rectum is not serious, especially if   hemorrhoids are present.)  6. Vomiting.  7. Weakness or dizziness.  GO DIRECTLY TO THE NEAREST EMERGENCY ROOM IF YOU HAVE ANY OF THE FOLLOWING:      Difficulty breathing              Chills and/or fever over 101 F   Persistent vomiting and/or vomiting blood   Severe abdominal pain   Severe chest pain   Black, tarry stools   Bleeding- more than one tablespoon   Any  other symptom or condition that you feel may need urgent attention  Your doctor recommends these additional instructions:  If any biopsies were taken, your doctors clinic will contact you in 1 to 2   weeks with any results.  - Discharge patient to home (ambulatory).   - Return to my office as previously scheduled.  For questions, problems or results please call your physician - Apurva Salinas MD at Work:  [phone].  [facility] [place], EMERGENCY ROOM PHONE NUMBER: [phone]  IF A COMPLICATION OR EMERGENCY SITUATION ARISES AND YOU ARE UNABLE TO REACH   YOUR PHYSICIAN - GO DIRECTLY TO THE EMERGENCY ROOM.  Apurva Salinas MD  4/13/2018 5:03:19 PM  This report has been verified and signed electronically.

## 2018-04-13 NOTE — INTERVAL H&P NOTE
The patient has been examined and the H&P has been reviewed:    I concur with the findings and no changes have occurred since H&P was written.    Anesthesia/Surgery risks, benefits and alternative options discussed and understood by patient/family.    Pre-Procedure H and P Addendum    Patient seen and examined.  History and exam unchanged from prior history and physical.      Procedure: EGD/colon   Indication: chest pain, nausea, vomiting, gerd, abd pain, diarrhea   ASA Class: per anesthesiology  Airway: per anesthesia  Neck Mobility: full range of motion  Mallampatti score: per anesthesia  History of anesthesia problems: no  Family history of anesthesia problems: no  Anesthesia Plan: per anesthesia        Active Hospital Problems    Diagnosis  POA    Diarrhea [R19.7]  Yes      Resolved Hospital Problems    Diagnosis Date Resolved POA   No resolved problems to display.

## 2018-04-13 NOTE — TELEPHONE ENCOUNTER
Anorectal manometry   Normal  resting sphincter tone   Weak squeeze  Dyssyngergic defecation Type II  Hypersensitivity in the rectum (pain at 20cc)   Patient was able to evacuate 50cc balloon.    Please let patient know that anorectal manometry shows:     Abnormal coordination of muscle during defecation   Weak anal sphincter    I would like to:   - Refer her to PT for biofeedback. Pls arrange.

## 2018-04-13 NOTE — PLAN OF CARE
Pt verbalized an understanding of discharge instructions including diet, s/s to notify md, and follow up.  Pt refused wheel chair and will ambulate off unit with spouse
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
## 2018-04-15 ENCOUNTER — PATIENT MESSAGE (OUTPATIENT)
Dept: GASTROENTEROLOGY | Facility: CLINIC | Age: 38
End: 2018-04-15

## 2018-04-16 ENCOUNTER — TELEPHONE (OUTPATIENT)
Dept: RADIOLOGY | Facility: HOSPITAL | Age: 38
End: 2018-04-16

## 2018-04-17 ENCOUNTER — HOSPITAL ENCOUNTER (OUTPATIENT)
Dept: RADIOLOGY | Facility: HOSPITAL | Age: 38
Discharge: HOME | End: 2018-04-17
Attending: INTERNAL MEDICINE
Payer: COMMERCIAL

## 2018-04-17 DIAGNOSIS — R91.1 LUNG NODULE SEEN ON IMAGING STUDY: ICD-10-CM

## 2018-04-17 DIAGNOSIS — R91.1 LUNG NODULE: ICD-10-CM

## 2018-04-17 PROCEDURE — 25500020 PHARM REV CODE 255: Mod: PO | Performed by: INTERNAL MEDICINE

## 2018-04-17 PROCEDURE — 71260 CT THORAX DX C+: CPT | Mod: TC,PO

## 2018-04-17 PROCEDURE — 71260 CT THORAX DX C+: CPT | Mod: 26,,, | Performed by: RADIOLOGY

## 2018-04-17 RX ADMIN — IOHEXOL 75 ML: 350 INJECTION, SOLUTION INTRAVENOUS at 02:04

## 2018-04-19 NOTE — TELEPHONE ENCOUNTER
"Dr. Salinas-     See message below:  Thank you for ordering   54635 (CPT®) - [place] ESOPHAGEAL CAPSULE ENDOSCOPY    17059 (CPT®) - [place] GI TRACT CAPSULE ENDOSCOPY    04764 (CPT®) - [place] GI WIRELESS CAPSULE MEASURE for patient Lia Avila, MRN [id number]. Unfortunately, the patient's insurance, BCBS OF [facility], has denied the service due to Medical Director Denial, N/A. This is an automated In Basket notification that does not require a reply. For a more detailed explanation, or for questions regarding this insurance denial, send an In Basket message to the Pre Service Intake pool or call the [facility] department at [phone] and reference referral ID [id number].The Pre-Service department hours are M-F 8 a.m. to 5 p.m.       Thank you,     [facility]      [facility] has denied the referral request for smart pill CPT 88891 for the reason listed below.     Camila Montgomery                 Note Type: Clinical Note  request for GI "smart pill" for pressure measurement for 39 yo mbr w n/v  [place] [address], denied as INS/EXP  "Based on review of available data, the Company considers measurement of gastrointestinal transit times, including gastric emptying and colonic transit times, using an ingestible pH and pressure capsule for the evaluation of suspected gastroparesis, constipation, or other gastrointestinal motility disorders to be investigational. The evidence is insufficient to determine the effects of the technology on health outcomes."        "

## 2018-04-20 ENCOUNTER — TELEPHONE (OUTPATIENT)
Dept: ENDOSCOPY | Facility: HOSPITAL | Age: 38
End: 2018-04-20

## 2018-04-20 NOTE — TELEPHONE ENCOUNTER
Spoke with patient and reviewed the results.    Patient verbalizes understanding.    Patient can not make it to [place] for PT, is there anything closer?

## 2018-04-21 ENCOUNTER — PATIENT MESSAGE (OUTPATIENT)
Dept: GASTROENTEROLOGY | Facility: CLINIC | Age: 38
End: 2018-04-21

## 2018-04-24 ENCOUNTER — PATIENT MESSAGE (OUTPATIENT)
Dept: GASTROENTEROLOGY | Facility: CLINIC | Age: 38
End: 2018-04-24

## 2018-04-24 DIAGNOSIS — M62.89 PELVIC FLOOR DYSFUNCTION: Primary | ICD-10-CM

## 2018-04-27 ENCOUNTER — ANESTHESIA EVENT (OUTPATIENT)
Dept: CARDIOLOGY | Facility: HOSPITAL | Age: 38
End: 2018-04-27
Payer: COMMERCIAL

## 2018-04-27 ENCOUNTER — HOSPITAL ENCOUNTER (OUTPATIENT)
Facility: HOSPITAL | Age: 38
Discharge: HOME | End: 2018-04-27
Attending: INTERNAL MEDICINE | Admitting: INTERNAL MEDICINE
Payer: COMMERCIAL

## 2018-04-27 ENCOUNTER — ANESTHESIA (OUTPATIENT)
Dept: CARDIOLOGY | Facility: HOSPITAL | Age: 38
End: 2018-04-27
Payer: COMMERCIAL

## 2018-04-27 ENCOUNTER — SURGERY (OUTPATIENT)
Age: 38
End: 2018-04-27

## 2018-04-27 VITALS
BODY MASS INDEX: 30.36 KG/M2 | WEIGHT: 165 LBS | TEMPERATURE: 98 F | OXYGEN SATURATION: 100 % | HEIGHT: 62 IN | HEART RATE: 65 BPM | RESPIRATION RATE: 16 BRPM

## 2018-04-27 DIAGNOSIS — R93.1 ABNORMAL FINDINGS ON DIAGNOSTIC IMAGING OF HEART AND CORONARY CIRCULATION: ICD-10-CM

## 2018-04-27 DIAGNOSIS — Q24.8 ABNORMAL CARDIAC VALVE: ICD-10-CM

## 2018-04-27 LAB — B-HCG UR QL: NEGATIVE

## 2018-04-27 PROCEDURE — 93320 DOPPLER ECHO COMPLETE: CPT | Mod: 26,,, | Performed by: INTERNAL MEDICINE

## 2018-04-27 PROCEDURE — 93325 DOPPLER ECHO COLOR FLOW MAPG: CPT | Mod: 26,,, | Performed by: INTERNAL MEDICINE

## 2018-04-27 PROCEDURE — 25000003 PHARM REV CODE 250: Performed by: NURSE ANESTHETIST, CERTIFIED REGISTERED

## 2018-04-27 PROCEDURE — 63600175 PHARM REV CODE 636 W HCPCS: Performed by: NURSE ANESTHETIST, CERTIFIED REGISTERED

## 2018-04-27 PROCEDURE — 81025 URINE PREGNANCY TEST: CPT

## 2018-04-27 PROCEDURE — 93312 ECHO TRANSESOPHAGEAL: CPT | Performed by: INTERNAL MEDICINE

## 2018-04-27 PROCEDURE — 37000008 HC ANESTHESIA 1ST 15 MINUTES: Performed by: INTERNAL MEDICINE

## 2018-04-27 PROCEDURE — 93312 ECHO TRANSESOPHAGEAL: CPT | Mod: 26,,, | Performed by: INTERNAL MEDICINE

## 2018-04-27 PROCEDURE — 37000009 HC ANESTHESIA EA ADD 15 MINS: Performed by: INTERNAL MEDICINE

## 2018-04-27 PROCEDURE — 25000003 PHARM REV CODE 250

## 2018-04-27 PROCEDURE — 93325 DOPPLER ECHO COLOR FLOW MAPG: CPT | Performed by: INTERNAL MEDICINE

## 2018-04-27 PROCEDURE — 93320 DOPPLER ECHO COMPLETE: CPT | Performed by: INTERNAL MEDICINE

## 2018-04-27 PROCEDURE — 63600175 PHARM REV CODE 636 W HCPCS

## 2018-04-27 RX ORDER — LIDOCAINE HYDROCHLORIDE 10 MG/ML
INJECTION INFILTRATION; PERINEURAL
Status: DISCONTINUED | OUTPATIENT
Start: 2018-04-27 | End: 2018-04-27

## 2018-04-27 RX ORDER — PROPOFOL 10 MG/ML
VIAL (ML) INTRAVENOUS
Status: DISCONTINUED | OUTPATIENT
Start: 2018-04-27 | End: 2018-04-27

## 2018-04-27 RX ORDER — SODIUM CHLORIDE 9 MG/ML
INJECTION, SOLUTION INTRAVENOUS CONTINUOUS PRN
Status: DISCONTINUED | OUTPATIENT
Start: 2018-04-27 | End: 2018-04-27

## 2018-04-27 RX ADMIN — PROPOFOL 30 MG: 10 INJECTION, EMULSION INTRAVENOUS at 12:04

## 2018-04-27 RX ADMIN — PROPOFOL 70 MG: 10 INJECTION, EMULSION INTRAVENOUS at 12:04

## 2018-04-27 RX ADMIN — SODIUM CHLORIDE: 0.9 INJECTION, SOLUTION INTRAVENOUS at 12:04

## 2018-04-27 RX ADMIN — LIDOCAINE HYDROCHLORIDE 30 MG: 10 INJECTION, SOLUTION INFILTRATION; PERINEURAL at 12:04

## 2018-04-27 NOTE — PLAN OF CARE
1330 VSS PT AWAKE AND ALERT DISCHARGE INSTRUCTIONS GIVEN TO PT AND . AMB IN UNIT CM AND PIV DC'D REDRESSED 1345 DISCHARGED PER W/C WITH BELONGINGS ACCOMPANIED BY THIS RN AND

## 2018-04-27 NOTE — TRANSFER OF CARE
"Anesthesia Transfer of Care Note    Patient: Lia Avila    Procedure(s) Performed: Procedure(s) (LRB):  TRANSESOPHAGEAL ECHOCARDIOGRAM (KARMEN) (N/A)    Patient location: PACU    Anesthesia Type: MAC    Transport from OR: Transported from OR on room air with adequate spontaneous ventilation    Post pain: adequate analgesia    Post assessment: no apparent anesthetic complications and tolerated procedure well    Post vital signs: stable    Level of consciousness: awake, alert and oriented    Nausea/Vomiting: no nausea/vomiting    Complications: none    Transfer of care protocol was followed      Last vitals:   Visit Vitals  Pulse 65   Temp 36.7 °C (98.1 °F) (Oral)   Resp 16   Ht 5' 2" (1.575 m)   Wt 74.8 kg (165 lb)   SpO2 100%   Breastfeeding? No   BMI 30.18 kg/m²     "

## 2018-04-27 NOTE — BRIEF OP NOTE
[facility] -   Brief Operative Note     SUMMARY     Surgery Date: 4/27/2018     Surgeon(s) and Role:     * Ward Ricks MD - Primary    Assisting Surgeon: None    Pre-op Diagnosis:  Abnormal echocardiogram [R93.1]    Post-op Diagnosis:  Post-Op Diagnosis Codes:     * Abnormal echocardiogram [R93.1]    Procedure(s) (LRB):  TRANSESOPHAGEAL ECHOCARDIOGRAM (KARMEN) (N/A)    Anesthesia: Monitor Anesthesia Care      Procedure Description: The risks, benefits, and alternatives of the procedure expalined in detail with patient and family. The patient voices understanding and all questions have been addressed. The patient agrees to proceed as planned.   The patient was sedated by anesthesiology service. The probe was advanced via throat into esophagus smoothly. The patient tolerated the procedure well and there was no complications. The probed was withdrawal at the end of study. The patient was hemodynamically stable.   Estimated Blood Loss: none.   Findings / Operative Note:   Positive atrial septal aneurysm and PFO with bidirectional shunting, left-to-right shunting predominant.  Normal EF and trial MR.    Ok to discharge to home once hemodynamically stable.  F/U with Dr. Leon in one week at cardiology clinic.  DIET regular diet  ACTIVITY as tolerated      Estimated Blood Loss: * No values recorded between 4/27/2018 12:00 AM and 4/27/2018  1:18 PM *         Specimens:   Specimen (12h ago through future)    None          Discharge Note    SUMMARY     Admit Date: 4/27/2018    Discharge Date and Time:  04/27/2018 1:19 PM    Final Diagnosis: Post-Op Diagnosis Codes:     * Abnormal echocardiogram [R93.1]    Disposition: Home or Self Care    Follow Up/Patient Instructions:     Medications:  Reconciled Home Medications:      Medication List      ASK your doctor about these medications    atorvastatin 20 MG tablet  Commonly known as:  LIPITOR  20 mg once daily.     dextroamphetamine-amphetamine 15 mg tablet  Commonly  known as:  ADDERALL  Take 60 mg by mouth once daily. 30 mg in morning, 20 mg afternoon, 10 mg  late evening     diazePAM 10 MG Tab  Commonly known as:  VALIUM  Take 10 mg by mouth as needed.     DULoxetine 60 MG capsule  Commonly known as:  CYMBALTA  Take 120 mg by mouth once daily.     esomeprazole 40 mg Grps  Commonly known as:  NexIUM Packet  Take 40 mg by mouth 2 (two) times daily before meals.     hydrocodone-acetaminophen 10-325mg  mg Tab  Commonly known as:  NORCO  Take by mouth every 6 (six) hours as needed for Pain.     linaclotide 290 mcg Cap  Commonly known as:  LINZESS  Take 1 capsule (290 mcg total) by mouth once daily at 6am.     metFORMIN 500 MG tablet  Commonly known as:  GLUCOPHAGE  Take 500 mg by mouth 2 (two) times daily with meals.     promethazine 25 MG tablet  Commonly known as:  PHENERGAN  Take 25 mg by mouth as needed for Nausea.     QUEtiapine 300 MG Tb24  Commonly known as:  SEROQUEL XR  Take 200 mg by mouth.     traZODone 150 MG tablet  Commonly known as:  DESYREL  Take 50 mg by mouth every evening.     VITAMIN D ORAL  Take 1,000 mg by mouth once daily.          No discharge procedures on file.

## 2018-04-27 NOTE — INTERVAL H&P NOTE
The patient has been examined and the H&P has been reviewed:    I concur with the findings and no changes have occurred since H&P was written.    Anesthesia/Surgery risks, benefits and alternative options discussed and understood by patient/family.      Echo revealed positive Bubble study and needs KARMEN to r/o ASD    There are no hospital problems to display for this patient.

## 2018-04-27 NOTE — ANESTHESIA POSTPROCEDURE EVALUATION
"Anesthesia Post Evaluation    Patient: Lia Avila    Procedure(s) Performed: Procedure(s) (LRB):  TRANSESOPHAGEAL ECHOCARDIOGRAM (KARMEN) (N/A)    Final Anesthesia Type: MAC  Patient location during evaluation: PACU  Patient participation: Yes- Able to Participate  Level of consciousness: awake and alert  Post-procedure vital signs: reviewed and stable  Pain management: adequate  Airway patency: patent  PONV status at discharge: No PONV  Anesthetic complications: no      Cardiovascular status: blood pressure returned to baseline  Respiratory status: unassisted, spontaneous ventilation and room air  Hydration status: euvolemic  Follow-up not needed.        Visit Vitals  Pulse 65   Temp 36.7 °C (98.1 °F) (Oral)   Resp 16   Ht 5' 2" (1.575 m)   Wt 74.8 kg (165 lb)   SpO2 100%   Breastfeeding? No   BMI 30.18 kg/m²       Pain/Matt Score: Pain Assessment Performed: Yes (4/27/2018 11:52 AM)  Presence of Pain: denies (4/27/2018 11:52 AM)  Matt Score: 10 (4/27/2018 11:52 AM)      "

## 2018-04-27 NOTE — ANESTHESIA RELEASE NOTE
"Anesthesia Release from PACU Note    Patient: Lia Avila    Procedure(s) Performed: Procedure(s) (LRB):  TRANSESOPHAGEAL ECHOCARDIOGRAM (KARMEN) (N/A)    Anesthesia type: MAC    Post pain: Adequate analgesia    Post assessment: no apparent anesthetic complications, tolerated procedure well and no evidence of recall    Last Vitals:   Visit Vitals  Pulse 65   Temp 36.7 °C (98.1 °F) (Oral)   Resp 16   Ht 5' 2" (1.575 m)   Wt 74.8 kg (165 lb)   SpO2 100%   Breastfeeding? No   BMI 30.18 kg/m²       Post vital signs: stable    Level of consciousness: awake, alert  and oriented    Nausea/Vomiting: no nausea/no vomiting    Complications: none    Airway Patency: patent    Respiratory: unassisted, spontaneous ventilation, room air    Cardiovascular: stable and blood pressure at baseline    Hydration: euvolemic  "

## 2018-04-27 NOTE — ANESTHESIA PREPROCEDURE EVALUATION
04/27/2018  Lia Avila is a 38 y.o., female.    Pre-op Assessment    I have reviewed the Patient Summary Reports.     I have reviewed the Nursing Notes.   I have reviewed the Medications.     Review of Systems  Anesthesia Hx:  No problems with previous Anesthesia  Denies Family Hx of Anesthesia complications.   Denies Personal Hx of Anesthesia complications.   Social:  Former Smoker 1/2 ppd x 15   ETOH   Cardiovascular:   Denies Hypertension.  Denies MI.   Angina, at rest ECG has been reviewed.    Pulmonary:   Sleep Apnea, CPAP    Hepatic/GI:   GERD, poorly controlled    Neurological:  Neurology Normal    Endocrine:   Diabetes, well controlled, type 2    Psych:   anxiety depression          Physical Exam  General:  Well nourished    Airway/Jaw/Neck:  Airway Findings: Mouth Opening: Normal General Airway Assessment: Adult  Mallampati: II       Chest/Lungs:  Chest/Lungs Findings: Clear to auscultation     Heart/Vascular:  Heart Findings: Rate: Normal  Rhythm: Regular Rhythm             Anesthesia Plan  Type of Anesthesia, risks & benefits discussed:  Anesthesia Type:  MAC  Patient's Preference:   Intra-op Monitoring Plan:   Intra-op Monitoring Plan Comments:   Post Op Pain Control Plan:   Post Op Pain Control Plan Comments:   Induction:   IV  Beta Blocker:  Patient is not currently on a Beta-Blocker (No further documentation required).       Informed Consent: Patient understands risks and agrees with Anesthesia plan.  Questions answered. Anesthesia consent signed with patient.  ASA Score: 2     Day of Surgery Review of History & Physical: I have interviewed and examined the patient. I have reviewed the patient's H&P dated:  There are no significant changes.

## 2018-04-30 LAB
DIASTOLIC DYSFUNCTION: NO
MITRAL VALVE REGURGITATION: NORMAL
RETIRED EF AND QEF - SEE NOTES: 55 (ref 55–65)

## 2018-05-01 ENCOUNTER — PATIENT MESSAGE (OUTPATIENT)
Dept: GASTROENTEROLOGY | Facility: CLINIC | Age: 38
End: 2018-05-01

## 2018-05-02 ENCOUNTER — PATIENT MESSAGE (OUTPATIENT)
Dept: GASTROENTEROLOGY | Facility: CLINIC | Age: 38
End: 2018-05-02

## 2018-05-03 RX ORDER — PROCHLORPERAZINE MALEATE 10 MG
10 TABLET ORAL 3 TIMES DAILY PRN
Qty: 90 TABLET | Refills: 6 | Status: SHIPPED | OUTPATIENT
Start: 2018-05-03 | End: 2018-06-02

## 2018-05-04 NOTE — TELEPHONE ENCOUNTER
Send Rx for compazine.  Pt will check w primary MD re seroquel and compazine together.  No contraindications per uptodate

## 2018-05-07 NOTE — TELEPHONE ENCOUNTER
Faxed information to [facility] for Appeal on [employer].    Fax sent 4/30/18.    Fax number: [phone]

## 2018-05-15 ENCOUNTER — NUTRITION (OUTPATIENT)
Dept: GASTROENTEROLOGY | Facility: CLINIC | Age: 38
End: 2018-05-15

## 2018-05-15 ENCOUNTER — LAB VISIT (OUTPATIENT)
Dept: LAB | Facility: HOSPITAL | Age: 38
End: 2018-05-15
Attending: INTERNAL MEDICINE
Payer: COMMERCIAL

## 2018-05-15 ENCOUNTER — OFFICE VISIT (OUTPATIENT)
Dept: GASTROENTEROLOGY | Facility: CLINIC | Age: 38
End: 2018-05-15
Payer: COMMERCIAL

## 2018-05-15 VITALS — BODY MASS INDEX: 29.41 KG/M2 | HEIGHT: 62 IN | WEIGHT: 159.81 LBS

## 2018-05-15 VITALS
HEART RATE: 100 BPM | BODY MASS INDEX: 31.03 KG/M2 | DIASTOLIC BLOOD PRESSURE: 68 MMHG | SYSTOLIC BLOOD PRESSURE: 126 MMHG | WEIGHT: 168.63 LBS | HEIGHT: 62 IN

## 2018-05-15 DIAGNOSIS — F41.9 ANXIETY AND DEPRESSION: ICD-10-CM

## 2018-05-15 DIAGNOSIS — R14.0 ABDOMINAL BLOATING: ICD-10-CM

## 2018-05-15 DIAGNOSIS — K31.84 GASTROPARESIS: ICD-10-CM

## 2018-05-15 DIAGNOSIS — R68.81 EARLY SATIETY: ICD-10-CM

## 2018-05-15 DIAGNOSIS — R11.2 NAUSEA AND VOMITING, INTRACTABILITY OF VOMITING NOT SPECIFIED, UNSPECIFIED VOMITING TYPE: ICD-10-CM

## 2018-05-15 DIAGNOSIS — R14.0 BLOATING: ICD-10-CM

## 2018-05-15 DIAGNOSIS — K59.00 CONSTIPATION, UNSPECIFIED CONSTIPATION TYPE: ICD-10-CM

## 2018-05-15 DIAGNOSIS — R07.89 NON-CARDIAC CHEST PAIN: ICD-10-CM

## 2018-05-15 DIAGNOSIS — R10.9 ABDOMINAL PAIN, UNSPECIFIED ABDOMINAL LOCATION: ICD-10-CM

## 2018-05-15 DIAGNOSIS — F32.A ANXIETY AND DEPRESSION: ICD-10-CM

## 2018-05-15 DIAGNOSIS — K21.9 GASTROESOPHAGEAL REFLUX DISEASE, ESOPHAGITIS PRESENCE NOT SPECIFIED: ICD-10-CM

## 2018-05-15 DIAGNOSIS — R10.10 UPPER ABDOMINAL PAIN: ICD-10-CM

## 2018-05-15 DIAGNOSIS — Z90.49 S/P SMALL BOWEL RESECTION: Primary | ICD-10-CM

## 2018-05-15 DIAGNOSIS — R19.7 DIARRHEA, UNSPECIFIED TYPE: ICD-10-CM

## 2018-05-15 DIAGNOSIS — M62.89 PELVIC FLOOR DYSFUNCTION: Primary | ICD-10-CM

## 2018-05-15 LAB
ALBUMIN SERPL BCP-MCNC: 3.6 G/DL
ALP SERPL-CCNC: 64 U/L
ALT SERPL W/O P-5'-P-CCNC: 13 U/L
ANION GAP SERPL CALC-SCNC: 8 MMOL/L
AST SERPL-CCNC: 14 U/L
BASOPHILS # BLD AUTO: 0.04 K/UL
BASOPHILS NFR BLD: 0.5 %
BILIRUB SERPL-MCNC: 0.3 MG/DL
BUN SERPL-MCNC: 10 MG/DL
CALCIUM SERPL-MCNC: 9.6 MG/DL
CHLORIDE SERPL-SCNC: 107 MMOL/L
CO2 SERPL-SCNC: 26 MMOL/L
CREAT SERPL-MCNC: 0.7 MG/DL
CRP SERPL-MCNC: 1.3 MG/L
DIFFERENTIAL METHOD: NORMAL
EOSINOPHIL # BLD AUTO: 0.1 K/UL
EOSINOPHIL NFR BLD: 1 %
ERYTHROCYTE [DISTWIDTH] IN BLOOD BY AUTOMATED COUNT: 13.1 %
ERYTHROCYTE [SEDIMENTATION RATE] IN BLOOD BY WESTERGREN METHOD: 5 MM/HR
EST. GFR  (AFRICAN AMERICAN): >60 ML/MIN/1.73 M^2
EST. GFR  (NON AFRICAN AMERICAN): >60 ML/MIN/1.73 M^2
FERRITIN SERPL-MCNC: 7 NG/ML
GLUCOSE SERPL-MCNC: 89 MG/DL
HBV CORE AB SERPL QL IA: NEGATIVE
HBV SURFACE AG SERPL QL IA: NEGATIVE
HCT VFR BLD AUTO: 39.9 %
HCV AB SERPL QL IA: NEGATIVE
HEPATITIS A ANTIBODY, IGG: NEGATIVE
HGB BLD-MCNC: 13.2 G/DL
IGA SERPL-MCNC: 166 MG/DL
IMM GRANULOCYTES # BLD AUTO: 0.04 K/UL
IMM GRANULOCYTES NFR BLD AUTO: 0.5 %
INR PPP: 0.9
IRON SERPL-MCNC: 62 UG/DL
LYMPHOCYTES # BLD AUTO: 1.6 K/UL
LYMPHOCYTES NFR BLD: 19.4 %
MCH RBC QN AUTO: 29.7 PG
MCHC RBC AUTO-ENTMCNC: 33.1 G/DL
MCV RBC AUTO: 90 FL
MONOCYTES # BLD AUTO: 0.6 K/UL
MONOCYTES NFR BLD: 7.5 %
NEUTROPHILS # BLD AUTO: 5.8 K/UL
NEUTROPHILS NFR BLD: 71.1 %
NRBC BLD-RTO: 0 /100 WBC
PLATELET # BLD AUTO: 183 K/UL
PMV BLD AUTO: 11.3 FL
POTASSIUM SERPL-SCNC: 4.3 MMOL/L
PROT SERPL-MCNC: 6.7 G/DL
PROTHROMBIN TIME: 9.5 SEC
RBC # BLD AUTO: 4.44 M/UL
SATURATED IRON: 14 %
SODIUM SERPL-SCNC: 141 MMOL/L
T4 FREE SERPL-MCNC: 0.74 NG/DL
TOTAL IRON BINDING CAPACITY: 441 UG/DL
TRANSFERRIN SERPL-MCNC: 298 MG/DL
TSH SERPL DL<=0.005 MIU/L-ACNC: 0.64 UIU/ML
WBC # BLD AUTO: 8.18 K/UL

## 2018-05-15 PROCEDURE — 3008F BODY MASS INDEX DOCD: CPT | Mod: CPTII,S$GLB,, | Performed by: INTERNAL MEDICINE

## 2018-05-15 PROCEDURE — 83516 IMMUNOASSAY NONANTIBODY: CPT

## 2018-05-15 PROCEDURE — 99999 PR PBB SHADOW E&M-EST. PATIENT-LVL II: CPT | Mod: PBBFAC,,,

## 2018-05-15 PROCEDURE — 99999 PR PBB SHADOW E&M-EST. PATIENT-LVL IV: CPT | Mod: PBBFAC,,, | Performed by: INTERNAL MEDICINE

## 2018-05-15 PROCEDURE — 85651 RBC SED RATE NONAUTOMATED: CPT

## 2018-05-15 PROCEDURE — 86790 VIRUS ANTIBODY NOS: CPT

## 2018-05-15 PROCEDURE — 91065 BREATH HYDROGEN/METHANE TEST: CPT | Mod: S$GLB,,, | Performed by: INTERNAL MEDICINE

## 2018-05-15 PROCEDURE — 87340 HEPATITIS B SURFACE AG IA: CPT

## 2018-05-15 PROCEDURE — 86140 C-REACTIVE PROTEIN: CPT

## 2018-05-15 PROCEDURE — 82784 ASSAY IGA/IGD/IGG/IGM EACH: CPT

## 2018-05-15 PROCEDURE — 82728 ASSAY OF FERRITIN: CPT

## 2018-05-15 PROCEDURE — 84439 ASSAY OF FREE THYROXINE: CPT

## 2018-05-15 PROCEDURE — 36415 COLL VENOUS BLD VENIPUNCTURE: CPT

## 2018-05-15 PROCEDURE — 84443 ASSAY THYROID STIM HORMONE: CPT

## 2018-05-15 PROCEDURE — 83540 ASSAY OF IRON: CPT

## 2018-05-15 PROCEDURE — 85610 PROTHROMBIN TIME: CPT

## 2018-05-15 PROCEDURE — 99215 OFFICE O/P EST HI 40 MIN: CPT | Mod: S$GLB,,, | Performed by: INTERNAL MEDICINE

## 2018-05-15 PROCEDURE — 86704 HEP B CORE ANTIBODY TOTAL: CPT

## 2018-05-15 PROCEDURE — 85025 COMPLETE CBC W/AUTO DIFF WBC: CPT

## 2018-05-15 PROCEDURE — 86803 HEPATITIS C AB TEST: CPT

## 2018-05-15 PROCEDURE — 86706 HEP B SURFACE ANTIBODY: CPT

## 2018-05-15 PROCEDURE — 80053 COMPREHEN METABOLIC PANEL: CPT

## 2018-05-15 NOTE — PROGRESS NOTES
Nutrition Assessment for Medical Nutrition Therapy    Referring professional: Apurva Salinsa MD    Reason for MNT visit: Pt in for education and nutrition counseling regarding:  Gas (Reports venting tube does not release gas and belching /emesis will occur ); Bloated (vents Peg Tube and reports undigested food from days before present ; cl liquid colonoscopy prep>gas/bloating)); Emesis (usually occurs at time of tube venting); Restrictive eating (expected vented abd contents visual dictates food choices ( avoids milk due to curdled appearance) ); Binge eating (follows period of restrictive eating ( children's easter candy) ); and Inadequate protein intake (Avoids dairy, animal protein by choice)    Patient's stated concern is abd distention/belly for which she is constantly mistaken for pregnant woman by strangers; she wishes to reduce her weight by restricting calories .      Pertinent Social History: , 2 children 10,13 years ; 12 th   ; followed by Dr Roach ( Psychiatrist) in [place] and Psychotherapist (Sofi Li); food issues not addressed directly.     Medical History:     Past Medical History:   Diagnosis Date    Acid reflux     Aerophagia     Alcoholic     recovering    Anxiety     Depression     Functional gastrointestinal disorder     IBS (irritable bowel syndrome)     Irritable bowel syndrome     Trivial aortic valve anomaly        Past Surgical History:   Procedure Laterality Date    BACK SURGERY      fusion of L4, L5, and S1    CHOLECYSTECTOMY      COLON SURGERY      COLONOSCOPY      COLONOSCOPY N/A 4/13/2018    Procedure: Colonoscopy;  Surgeon: Apurva Salinas MD;  Location: [address]);  Service: Endoscopy;  Laterality: N/A;  5 days of full liquids then 24 hours clear liquids    GASTROSTOMY-JEJEUNOSTOMY TUBE CHANGE/PLACEMENT      SMALL INTESTINE SURGERY      UPPER GASTROINTESTINAL ENDOSCOPY            Pertinent Medications: Reports Seroquel dosage  reduced due to side neuro side effects ; does not check BG ; recalls when she avoids eating for 6-7 hours will feel chills and foggy headedness but has not checked BG   Current Outpatient Prescriptions on File Prior to Visit   Medication Sig Dispense Refill    atorvastatin (LIPITOR) 20 MG tablet 20 mg once daily.   1    dextroamphetamine-amphetamine (ADDERALL) 15 mg tablet Take 60 mg by mouth once daily. 30 mg in morning, 20 mg afternoon, 10 mg  late evening      diazePAM (VALIUM) 10 MG Tab Take 10 mg by mouth as needed.      duloxetine (CYMBALTA) 60 MG capsule Take 120 mg by mouth once daily.       ERGOCALCIFEROL, VITAMIN D2, (VITAMIN D ORAL) Take 1,000 mg by mouth once daily.      esomeprazole (NEXIUM PACKET) 40 mg GrPS Take 40 mg by mouth 2 (two) times daily before meals. 2400 mg 1    hydrocodone-acetaminophen 10-325mg (NORCO)  mg Tab Take by mouth every 6 (six) hours as needed for Pain.      metformin (GLUCOPHAGE) 500 MG tablet Take 500 mg by mouth 2 (two) times daily with meals.       prochlorperazine (COMPAZINE) 10 MG tablet Take 1 tablet (10 mg total) by mouth 3 (three) times daily as needed. 90 tablet 6    quetiapine (SEROQUEL XR) 300 MG Tb24 Take 150 mg by mouth once daily.       trazodone (DESYREL) 150 MG tablet Take 50 mg by mouth every evening.       [DISCONTINUED] linaclotide (LINZESS) 290 mcg Cap Take 1 capsule (290 mcg total) by mouth once daily at 6am. 30 capsule 10    [DISCONTINUED] promethazine (PHENERGAN) 25 MG tablet Take 25 mg by mouth as needed for Nausea.       No current facility-administered medications on file prior to visit.        Vitamins/Supplements/Herbs: Vit D, IB Guard    Food intolerances or allergies: reports gas, distention, abd bloating , belching with all oral foods but grits, rice krispies and egg tolerated the best ; reports colonoscopy prep caused abd distention   Review of patient's allergies indicates:   Allergen Reactions    Sulfa (sulfonamide  "antibiotics)        Labs:      Chemistry        Component Value Date/Time     05/15/2018 0940    K 4.3 05/15/2018 0940     05/15/2018 0940    CO2 26 05/15/2018 0940    BUN 10 05/15/2018 0940    CREATININE 0.7 05/15/2018 0940    GLU 89 05/15/2018 0940        Component Value Date/Time    CALCIUM 9.6 05/15/2018 0940    ALKPHOS 64 05/15/2018 0940    AST 14 05/15/2018 0940    ALT 13 05/15/2018 0940    BILITOT 0.3 05/15/2018 0940    ESTGFRAFRICA >60.0 05/15/2018 0940    EGFRNONAA >60.0 05/15/2018 0940          No results found for: CHOL  No results found for: HDL  No results found for: LDLCALC  Lab Results   Component Value Date    TRIG 106 01/10/2017     No results found for: CHOLHDL  Lab Results   Component Value Date    HGBA1C 4.6 01/02/2017                  Ht: Height: 5' 2" (157.5 cm)  Wt: Weight: 72.5 kg (159 lb 13.3 oz)  Last 3 Weights:   Wt Readings from Last 3 Encounters:   05/15/18 72.5 kg (159 lb 13.3 oz)   05/15/18 76.5 kg (168 lb 10.4 oz)   04/27/18 74.8 kg (165 lb)     BMI: Body mass index is 29.23 kg/m².    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 Reports best food tolerance occurs in evening ( before bed)  when she takes Seroquel after eating a meal of rice krispes or grits and is not awakened due to gas/bloating .  Consumes rice krispies and grits consistently with good tolerance ; eggs tolerated but concern about cholesterol intake; does not tolerate texture of meat well but will consume eggs ; avoids milk due to anticipated curdled appearance when she vents tube for gas/abd distention. Consumes sips " of [employer] throughout the day more as a comfort food than about the content. Drinks little fluid during the day -- when shown 20 oz cup , she indicated that is her total intake for the day of fluid .  Avoids sugar sweetened foods as it is a trigger for bingeing;   Nutrition History    Dry mouth observed during session which seem to impact clear speech   Meal patterns: 1 meal per day   Breakfast: 7 am coffee with stevia sweetener  7-10 am Grits or Dry rice Krispies   Lunch: sips of coke or vitamin water   Dinner: sips of coke or vitamin water/  cooks meat, vegetable  starch -- she may eat  starch   Bowel Function :   Constipation    Meal preparation/shopping: spouse    Nutrition beverages: drinks carbonated drinks, drinks caffeine     Dining out:  and his family like dining out ; patient has stopped attending family functions;     Smoking/alcohol:  Social History   Substance Use Topics    Smoking status: Former Smoker     Packs/day: 0.25     Quit date: 12/3/2016    Smokeless tobacco: Never Used    Alcohol use No      Comment: pt states that she is a recoveirng alcoholic, last drink 7-2-11       Nutrition Diagnosis:     Primary Problem: Inadequate calorie/protein intake  Etiology : Related to lack of appetite and gas/bloating post ingestion of foods   Signs and symptoms :   As evidenced by recall     Primary Problem: Gas and Flatulence   Etiology : Related to unknown factors   Signs and Symptoms : As evidenced by diet recall    Primary problem:Constipation /Irregular bowel habits   Etiology: Related to inadequate/inconsistent  fluid intake   Signs and symptoms : As evidenced by diet recall         Patient-specific Challenges:  Following through or establishing routine with self care including meals   Abd bloating   Early satiety   Lack of appetite   Inadequate calorie free  fluid intake  Anxiety related to mealtime due to hx of abdominal pain after eating   Mental health issues related to trauma hx  impacting oral food intake/tolerance       Motivation to change: Primary -anxious about appearance and desire to reduce weight; Secondary -  tolerance of food and determination of what types of foods are best tolerated.      Patient Goal :         Structured pattern of eating which will require iittle planning , be readily available and allow her to consume meals at table with daughters        Reduce incidence of flatulence         Reduced anxiety around mealtime             MNT Recommendations/Interventions/Goals  Continued work with mental health professionals to address anxiety/ADD/erratic eating pattern    Use of meal replacement or supplement to minimize planning yet provide essential protein/vits and cho source in easy to tolerate form( Pronourish)  Source of protein best tolerated ( discusssed trial of Unjury protein powder added to water ,eggs)   Increased fluid intake to address dry mouth ( Unjury added to fluid a way to increase free water and protein in easy to digest form )  Sugar free Vitamin water to enhance free water /vits/mineral/electrolytes           Discussion with patient included: Long discussion about interaction between mental health issues and GI tolerances and mutually agreed upon plan to include Pronourish as source of ready to drink protein with minimal FODMAPS; Unjury Protein , basic carbs of rice or grits as calorie sources ; impact of restrictive eating on lack of weight loss and comfort level assessed with proposed changes.     Handouts provided:   [facility] GI Nutrition Gastroparesis ; /low FODMAP  As Pronourish, eggs, Unjury, Vitamin water zero , grits, rice krispies without milk   Comprehensiongood   Patient demonstrated understanding: role of each component of diet in digestion and impact of anxiety /trauma history on perception of food as nourishment   Nutrition follow-up:  5/29/15     Counseling time: 1 Hour 45 Minutes
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
## 2018-05-16 LAB
HEP. B SURF AB, QUAL: NEGATIVE
HEP. B SURF AB, QUANT.: <3 MIU/ML
TTG IGA SER IA-ACNC: 5 UNITS

## 2018-05-18 ENCOUNTER — OFFICE VISIT (OUTPATIENT)
Dept: CARDIOLOGY | Facility: CLINIC | Age: 38
End: 2018-05-18
Payer: COMMERCIAL

## 2018-05-18 ENCOUNTER — CLINICAL SUPPORT (OUTPATIENT)
Dept: CARDIOLOGY | Facility: CLINIC | Age: 38
End: 2018-05-18
Payer: COMMERCIAL

## 2018-05-18 VITALS
HEART RATE: 85 BPM | SYSTOLIC BLOOD PRESSURE: 118 MMHG | WEIGHT: 167.13 LBS | DIASTOLIC BLOOD PRESSURE: 66 MMHG | BODY MASS INDEX: 30.76 KG/M2 | HEIGHT: 62 IN

## 2018-05-18 DIAGNOSIS — R07.9 CHEST PAIN, UNSPECIFIED: Primary | ICD-10-CM

## 2018-05-18 DIAGNOSIS — R07.89 ATYPICAL CHEST PAIN: Primary | ICD-10-CM

## 2018-05-18 DIAGNOSIS — R07.9 CHEST PAIN, UNSPECIFIED: ICD-10-CM

## 2018-05-18 DIAGNOSIS — I25.3 PFO WITH ATRIAL SEPTAL ANEURYSM: ICD-10-CM

## 2018-05-18 DIAGNOSIS — Q21.12 PFO WITH ATRIAL SEPTAL ANEURYSM: ICD-10-CM

## 2018-05-18 PROCEDURE — 93000 ELECTROCARDIOGRAM COMPLETE: CPT | Mod: S$GLB,,, | Performed by: INTERNAL MEDICINE

## 2018-05-18 PROCEDURE — 99999 PR PBB SHADOW E&M-EST. PATIENT-LVL III: CPT | Mod: PBBFAC,,, | Performed by: INTERNAL MEDICINE

## 2018-05-18 PROCEDURE — 99213 OFFICE O/P EST LOW 20 MIN: CPT | Mod: S$GLB,,, | Performed by: INTERNAL MEDICINE

## 2018-05-18 PROCEDURE — 3008F BODY MASS INDEX DOCD: CPT | Mod: CPTII,S$GLB,, | Performed by: INTERNAL MEDICINE

## 2018-05-18 NOTE — PROGRESS NOTES
"Subjective:    Patient ID:  Lia Avila is a 38 y.o. female who presents for evaluation of Chest Pain (follow up from KARMEN) and Shortness of Breath      HPI  Pt presents for f/u.  H/o insulin resistance, hyperlipidemia.  Nonsmoker. Former alcoholic.  No prior h/o CV disease.  Past hx pertinent for following:  H/o numerous GI problems, documented in epic chart, prolonged hospitalization last year, has g tube; ongoing gastric workup..  ecg today is normal.  ecgs in past are normal, or unremarkable.  She has cp sxs over last 4 - 6 months. Atypical, lower chest, 30 seconds, resolves on its own.  Nonexertional.  She gets abdominal swelling, distention, and feels short of breath when this happens.   Pt seen recently for cp and had - stress test.  Echo suggested atrial septal shunt therefore KARMEN scheduled.  S/p KARMEN with Dr. Ricks 4/18 and found to have PFO, atrial septal aneurysm.  ecg today is normal.  No changes in cp sxs.    No h/o tia/cva.       Review of Systems   Constitution: Negative.   HENT: Negative.    Eyes: Negative.    Cardiovascular: Positive for chest pain and dyspnea on exertion.   Respiratory: Positive for shortness of breath.    Endocrine: Negative.    Hematologic/Lymphatic: Negative.    Skin: Negative.    Musculoskeletal: Negative.    Gastrointestinal: Positive for bloating, diarrhea and nausea.   Genitourinary: Negative.    Neurological: Negative.    Psychiatric/Behavioral: Negative.    Allergic/Immunologic: Negative.        /66 (BP Location: Left arm, Patient Position: Sitting, BP Method: Medium (Manual))   Pulse 85   Ht 5' 2" (1.575 m)   Wt 75.8 kg (167 lb 1.7 oz)   BMI 30.56 kg/m²   Wt Readings from Last 3 Encounters:   05/18/18 75.8 kg (167 lb 1.7 oz)   05/15/18 72.5 kg (159 lb 13.3 oz)   05/15/18 76.5 kg (168 lb 10.4 oz)     Temp Readings from Last 3 Encounters:   04/27/18 98.1 °F (36.7 °C) (Oral)   04/13/18 98.6 °F (37 °C) (Temporal)   04/03/18 98.1 °F (36.7 °C) (Temporal) "     BP Readings from Last 3 Encounters:   05/18/18 118/66   05/15/18 126/68   04/13/18 (!) 106/59     Pulse Readings from Last 3 Encounters:   05/18/18 85   05/15/18 100   04/27/18 65          Objective:    Physical Exam   Constitutional: She is oriented to person, place, and time. Vital signs are normal. She appears well-developed and well-nourished. She is active and cooperative. She does not have a sickly appearance. She does not appear ill. No distress.   HENT:   Head: Normocephalic.   Neck: Neck supple. No JVD present. Carotid bruit is not present.   Cardiovascular: Normal rate, regular rhythm, S1 normal, S2 normal, normal heart sounds and normal pulses.  PMI is not displaced.  Exam reveals no gallop and no friction rub.    No murmur heard.  Pulses:       Radial pulses are 2+ on the right side, and 2+ on the left side.   Pulmonary/Chest: Effort normal and breath sounds normal. She has no wheezes. She has no rales.   Abdominal: Soft. Normal appearance and bowel sounds are normal. She exhibits no abdominal bruit. There is no tenderness.   Musculoskeletal: She exhibits no edema.   Lymphadenopathy:     She has no cervical adenopathy.   Neurological: She is alert and oriented to person, place, and time.   Skin: Skin is warm. She is not diaphoretic.   Psychiatric: She has a normal mood and affect. Her behavior is normal.   Nursing note and vitals reviewed.      I have reviewed all pertinent labs and cardiac studies.      Chemistry        Component Value Date/Time     05/15/2018 0940    K 4.3 05/15/2018 0940     05/15/2018 0940    CO2 26 05/15/2018 0940    BUN 10 05/15/2018 0940    CREATININE 0.7 05/15/2018 0940    GLU 89 05/15/2018 0940        Component Value Date/Time    CALCIUM 9.6 05/15/2018 0940    ALKPHOS 64 05/15/2018 0940    AST 14 05/15/2018 0940    ALT 13 05/15/2018 0940    BILITOT 0.3 05/15/2018 0940    ESTGFRAFRICA >60.0 05/15/2018 0940    EGFRNONAA >60.0 05/15/2018 0940        Lab Results    Component Value Date    WBC 8.18 05/15/2018    HGB 13.2 05/15/2018    HCT 39.9 05/15/2018    MCV 90 05/15/2018     05/15/2018     No results found for: CHOL  No results found for: HDL  No results found for: LDLCALC  Lab Results   Component Value Date    TRIG 106 01/10/2017     No results found for: CHOLHDL        Assessment:       1. Atypical chest pain    2. PFO with atrial septal aneurysm         Plan:             Atypical cp sxs, likely non-cardiac.  Negative stress test.   PFO with atrial septal aneurysm:  Long discussion with pt on condition and future mgt.  Advise 81 mg ASA for prevention if ok with her other physicians.  Indications for closure of PFO discussed in detail.  Likely an incidental finding however pt should be aware of her condition in case she should ever develop TIA and/or CVA in future.  I have given her the name of Dr. Dwight Lara, [facility]- Cardiology, should she be interested in second opinion and/or should she ever require closure in future.    Continue current meds/mgt.  No changes today.    Schedule f/u appt 1 year.

## 2018-05-20 ENCOUNTER — PATIENT MESSAGE (OUTPATIENT)
Dept: GASTROENTEROLOGY | Facility: CLINIC | Age: 38
End: 2018-05-20

## 2018-05-29 ENCOUNTER — PATIENT MESSAGE (OUTPATIENT)
Dept: GASTROENTEROLOGY | Facility: CLINIC | Age: 38
End: 2018-05-29

## 2018-05-29 ENCOUNTER — NUTRITION (OUTPATIENT)
Dept: GASTROENTEROLOGY | Facility: CLINIC | Age: 38
End: 2018-05-29

## 2018-05-29 ENCOUNTER — CLINICAL SUPPORT (OUTPATIENT)
Dept: REHABILITATION | Facility: OTHER | Age: 38
End: 2018-05-29
Attending: INTERNAL MEDICINE
Payer: COMMERCIAL

## 2018-05-29 VITALS — WEIGHT: 162.94 LBS | BODY MASS INDEX: 29.98 KG/M2 | HEIGHT: 62 IN

## 2018-05-29 DIAGNOSIS — K58.8 OTHER IRRITABLE BOWEL SYNDROME: ICD-10-CM

## 2018-05-29 DIAGNOSIS — R68.81 EARLY SATIETY: ICD-10-CM

## 2018-05-29 DIAGNOSIS — F45.8: ICD-10-CM

## 2018-05-29 DIAGNOSIS — M62.89 PELVIC FLOOR DYSFUNCTION: Primary | ICD-10-CM

## 2018-05-29 DIAGNOSIS — K31.84 GASTROPARESIS: Primary | ICD-10-CM

## 2018-05-29 DIAGNOSIS — R14.0 BLOATING: ICD-10-CM

## 2018-05-29 DIAGNOSIS — F32.A ANXIETY AND DEPRESSION: ICD-10-CM

## 2018-05-29 DIAGNOSIS — Z90.49 S/P SMALL BOWEL RESECTION: ICD-10-CM

## 2018-05-29 DIAGNOSIS — F41.9 ANXIETY AND DEPRESSION: ICD-10-CM

## 2018-05-29 PROCEDURE — 97110 THERAPEUTIC EXERCISES: CPT

## 2018-05-29 PROCEDURE — 99999 PR PBB SHADOW E&M-EST. PATIENT-LVL I: CPT | Mod: PBBFAC,,,
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
## 2018-05-29 NOTE — PROGRESS NOTES
Follow Up to initial encounter several weeks ago .    Changes since last visit:  Has initiated use of Pronourish/Rice Krispies or Grits  and use of Unjury protein added to liquids in addition to eggs   Has been able to consume small amounts of food away from home ( teachers luncheon )  Reduced bloating and need to vent tube with small dietary changes discussed, feeling better, better spirits   Has Smoothie Ayla food cake smoothie today to which she added unjury protein instead of protein used in store.    less anxious and family members monitoring less  Reduced anxiety with end of school   Planning to see Therapist in near future for options .

## 2018-06-04 ENCOUNTER — HOSPITAL ENCOUNTER (OUTPATIENT)
Dept: RADIOLOGY | Facility: HOSPITAL | Age: 38
Discharge: HOME | End: 2018-06-04
Attending: INTERNAL MEDICINE
Payer: COMMERCIAL

## 2018-06-04 ENCOUNTER — CLINICAL SUPPORT (OUTPATIENT)
Dept: REHABILITATION | Facility: HOSPITAL | Age: 38
End: 2018-06-04
Attending: INTERNAL MEDICINE
Payer: COMMERCIAL

## 2018-06-04 DIAGNOSIS — M62.89 PELVIC FLOOR DYSFUNCTION: ICD-10-CM

## 2018-06-04 DIAGNOSIS — M62.89 PELVIC FLOOR DYSFUNCTION: Primary | ICD-10-CM

## 2018-06-04 PROCEDURE — 72196 MRI PELVIS W/DYE: CPT | Mod: 26,,, | Performed by: RADIOLOGY

## 2018-06-04 PROCEDURE — 97110 THERAPEUTIC EXERCISES: CPT | Mod: PN

## 2018-06-04 PROCEDURE — 72196 MRI PELVIS W/DYE: CPT | Mod: TC

## 2018-06-04 NOTE — PATIENT INSTRUCTIONS
DIAPHRAGMATIC BREATHING     The diaphragm is a dome shaped muscle that forms the floor of the rib cage. It is the most efficient muscle for breathing and relaxation, although most people are not used to using the diaphragm. Diaphragmatic or belly breathing is an important technique to learn because it helps settle down or relax the autonomic nervous system. The correct use of diaphragmatic breathing can help to quiet brain activity resulting in the relaxation of all the muscles and organs of the body. This is accomplished by slow rhythmic breathing concentrated in the diaphragm muscle rather than the chest.     How to do proper relaxation breathing:     Start by lying on your back in a relaxed position with knees bent, feet flat or some support under your knees. Place one hand on your abdomen.   Relax your jaw, corners of the mouth, corners of the eyes, abdomen, inner thighs, gluts, hips, legs.    Take a deep breath in through your nose, letting the belly expand and rise.    As you breathe out through your mouth (as if blowing out candles), allow your abdomen and chest to fall. Exhale completely.   Remember to breathe slowly.  Do not force your breathing, this is a gentle breath. Do not hold your breath.   Repeat for 5 or so breaths.     Bring your hands to your rib cage. Inhale, feel your ribs flare out and open. Exhale and feel your ribs gently relax down and in. 5 or so breaths.   One hand on the belly, one hand on the chest. Inhale to feel the belly, ribs, and chest rise and expand (the entire trunk expands circumferentially outwards, front to back and side to side). Exhale to feel belly, ribs, chest relax down and in. 5 or so breaths.   Repeat for a few rounds.
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
## 2018-06-07 ENCOUNTER — TELEPHONE (OUTPATIENT)
Dept: GASTROENTEROLOGY | Facility: CLINIC | Age: 38
End: 2018-06-07

## 2018-06-07 DIAGNOSIS — N81.4 UTERINE PROLAPSE: Primary | ICD-10-CM

## 2018-06-07 NOTE — TELEPHONE ENCOUNTER
Your recent MRI shows:    Cystocele (weaknes in the bladder, small prolapse of uterus. I would like to refer you to Urologynecology to address this further.      Rectocele (weakness in the wall of rectum) and rectal intussusception (rectal obstruction due top straining). You will work on this with the physical therapist      Sincerely,   Dr. Salinas

## 2018-06-08 NOTE — TELEPHONE ENCOUNTER
Please call Dr. Lance office and see if they are able to see her sooner.  If you do not get a favorable response, I will email her. She could also see her partner

## 2018-06-08 NOTE — TELEPHONE ENCOUNTER
Spoke with patient and reviewed the results.    Patient verbalizes understanding.    Dr. 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 Please advise.

## 2018-06-11 ENCOUNTER — CLINICAL SUPPORT (OUTPATIENT)
Dept: REHABILITATION | Facility: HOSPITAL | Age: 38
End: 2018-06-11
Attending: INTERNAL MEDICINE
Payer: COMMERCIAL

## 2018-06-11 ENCOUNTER — PATIENT MESSAGE (OUTPATIENT)
Dept: REHABILITATION | Facility: HOSPITAL | Age: 38
End: 2018-06-11

## 2018-06-11 DIAGNOSIS — K58.8 OTHER IRRITABLE BOWEL SYNDROME: Chronic | ICD-10-CM

## 2018-06-11 DIAGNOSIS — M62.89 PELVIC FLOOR DYSFUNCTION: Primary | ICD-10-CM

## 2018-06-11 DIAGNOSIS — R10.9 ABDOMINAL PAIN, UNSPECIFIED ABDOMINAL LOCATION: ICD-10-CM

## 2018-06-11 PROCEDURE — 97112 NEUROMUSCULAR REEDUCATION: CPT | Mod: PN
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
## 2018-06-12 ENCOUNTER — TELEPHONE (OUTPATIENT)
Dept: UROGYNECOLOGY | Facility: CLINIC | Age: 38
End: 2018-06-12

## 2018-06-12 NOTE — TELEPHONE ENCOUNTER
----- Message from Kdai Montano MA sent at 6/12/2018  7:22 AM CDT -----  Dr. Salinas is referring this patient to see Dr. Lance, she has several appointments in July and we were trying to see if there is a wait list for a sooner appointment if possible. I did not see any available.    Thank you so much for all your help.    ROMAN aWllis  Ext [phone]

## 2018-06-12 NOTE — TELEPHONE ENCOUNTER
Spoke to pt, consult appointment moved to July 18th at 11a, pt aware and verbalizes understanding.

## 2018-06-18 ENCOUNTER — PATIENT MESSAGE (OUTPATIENT)
Dept: GASTROENTEROLOGY | Facility: CLINIC | Age: 38
End: 2018-06-18

## 2018-06-19 ENCOUNTER — TELEPHONE (OUTPATIENT)
Dept: GASTROENTEROLOGY | Facility: CLINIC | Age: 38
End: 2018-06-19

## 2018-06-19 NOTE — TELEPHONE ENCOUNTER
Small Intestinal Bacterial Breath Test Results:    (See scanned report for details):      Hydrogen H2 production (<20ppm): 1  Methane CH4 (<10ppm): 0   Carbon dioxide correction factor (<2.5): OK    Interpretation   SIBO unsupported      Pls let pt know that  His/her breath test does not show bacterial overgrowth

## 2018-06-28 ENCOUNTER — TELEPHONE (OUTPATIENT)
Dept: GASTROENTEROLOGY | Facility: CLINIC | Age: 38
End: 2018-06-28

## 2018-06-28 NOTE — TELEPHONE ENCOUNTER
----- Message from DARYL Person sent at 6/28/2018  2:58 PM CDT -----  Please check on this.  ----- Message -----  From: Kadi Montano MA  Sent: 6/22/2018   7:27 AM  To: DARYL Person    Have you heard anything about this patient's smart pill, we sent the appeal in and have not heard anything.

## 2018-06-28 NOTE — TELEPHONE ENCOUNTER
Amy Wallis, I received a message from Cecy Valderrama with Pre service intake and she said:     Hi, the pre service department was not involved in the appeal.  That is usually handled by the office.

## 2018-07-05 NOTE — TELEPHONE ENCOUNTER
Eleanor,  Please call [facility] to check on status of smart pill authorization.  You can find the phone number on the pt's insurance card.    Thank you,  Andie

## 2018-07-06 ENCOUNTER — CLINICAL SUPPORT (OUTPATIENT)
Dept: REHABILITATION | Facility: HOSPITAL | Age: 38
End: 2018-07-06
Attending: INTERNAL MEDICINE
Payer: COMMERCIAL

## 2018-07-06 DIAGNOSIS — R10.9 ABDOMINAL PAIN, UNSPECIFIED ABDOMINAL LOCATION: ICD-10-CM

## 2018-07-06 DIAGNOSIS — M62.89 PELVIC FLOOR DYSFUNCTION: Primary | ICD-10-CM

## 2018-07-06 PROCEDURE — 97112 NEUROMUSCULAR REEDUCATION: CPT | Mod: PN
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
## 2018-07-11 NOTE — TELEPHONE ENCOUNTER
Spoke to Marianna of [facility], appeal was never initiated for smart pill.    Appeal letter faxed to [phone]/ [phone] this morning.

## 2018-07-18 ENCOUNTER — INITIAL CONSULT (OUTPATIENT)
Dept: UROGYNECOLOGY | Facility: CLINIC | Age: 38
End: 2018-07-18
Payer: COMMERCIAL

## 2018-07-18 VITALS
HEIGHT: 62 IN | SYSTOLIC BLOOD PRESSURE: 100 MMHG | WEIGHT: 165.13 LBS | DIASTOLIC BLOOD PRESSURE: 76 MMHG | BODY MASS INDEX: 30.39 KG/M2

## 2018-07-18 DIAGNOSIS — M62.89 PELVIC FLOOR DYSFUNCTION: ICD-10-CM

## 2018-07-18 DIAGNOSIS — M79.18 MYALGIA OF PELVIC FLOOR: ICD-10-CM

## 2018-07-18 DIAGNOSIS — K59.09 CHRONIC CONSTIPATION: ICD-10-CM

## 2018-07-18 DIAGNOSIS — R35.1 NOCTURIA MORE THAN TWICE PER NIGHT: ICD-10-CM

## 2018-07-18 DIAGNOSIS — N81.4 UTEROVAGINAL PROLAPSE: ICD-10-CM

## 2018-07-18 DIAGNOSIS — N81.11 CYSTOCELE, MIDLINE: ICD-10-CM

## 2018-07-18 DIAGNOSIS — N81.6 RECTOCELE, FEMALE: ICD-10-CM

## 2018-07-18 DIAGNOSIS — N39.41 URINARY INCONTINENCE, URGE: Primary | ICD-10-CM

## 2018-07-18 DIAGNOSIS — R10.9 ABDOMINAL PAIN, UNSPECIFIED ABDOMINAL LOCATION: ICD-10-CM

## 2018-07-18 PROCEDURE — 99999 PR PBB SHADOW E&M-EST. PATIENT-LVL III: CPT | Mod: PBBFAC,,, | Performed by: OBSTETRICS & GYNECOLOGY

## 2018-07-18 PROCEDURE — 99245 OFF/OP CONSLTJ NEW/EST HI 55: CPT | Mod: 25,S$GLB,, | Performed by: OBSTETRICS & GYNECOLOGY

## 2018-07-18 PROCEDURE — 51701 INSERT BLADDER CATHETER: CPT | Mod: S$GLB,,, | Performed by: OBSTETRICS & GYNECOLOGY

## 2018-07-18 PROCEDURE — 87086 URINE CULTURE/COLONY COUNT: CPT

## 2018-07-18 RX ORDER — DEXTROAMPHETAMINE SACCHARATE, AMPHETAMINE ASPARTATE, DEXTROAMPHETAMINE SULFATE AND AMPHETAMINE SULFATE 5; 5; 5; 5 MG/1; MG/1; MG/1; MG/1
TABLET ORAL
COMMUNITY
Start: 2018-06-04

## 2018-07-18 RX ORDER — FERROUS GLUCONATE 324(38)MG
324 TABLET ORAL
COMMUNITY
End: 2021-06-04

## 2018-07-18 RX ORDER — LINACLOTIDE 290 UG/1
CAPSULE, GELATIN COATED ORAL
COMMUNITY
Start: 2018-06-25 | End: 2018-10-10

## 2018-07-18 NOTE — LETTER
July 22, 2018      Apurva Salinas MD  [address]  [facility] [address]           [facility] [address] [place] [address]  Phone: [phone]          Patient: Lia Avila   MR Number: [id number]   YOB: 1980   Date of Visit: 7/18/2018       Dear Dr. Apurva Salinas:    Thank you for referring Lia Avila to me for evaluation. Attached you will find relevant portions of my assessment and plan of care.    If you have questions, please do not hesitate to call me. I look forward to following Lia Avila along with you.    Sincerely,    Omayra Lance MD    Enclosure  CC:  No Recipients    If you would like to receive this communication electronically, please contact [email or web address] or [phone] to request more information on [employer] Link access.    For providers and/or their staff who would like to refer a patient to [facility], please contact us through our one-stop-shop provider referral line, [facility] , at [phone].    If you feel you have received this communication in error or would no longer like to receive these types of communications, please e-mail [email or web address]
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
## 2018-07-18 NOTE — PATIENT INSTRUCTIONS
Bladder Irritants  Certain foods and drinks have been associated with worsening symptoms of urinary frequency, urgency, urge incontinence, or bladder pain. If you suffer from any of these conditions, you may wish to try eliminating one or more of these foods from your diet and see if your symptoms improve. If bladder symptoms are related to dietary factors, strict adherence to a diet thateliminates the food should bring marked relief in 10 days. Once you are feeling better, you can begin to add foods back into your diet, one at a time. If symptoms return, you will be able to identify the irritant. As you add foods back to your diet it is very important that you drink significant amounts of water.    -----------------------------------------------------------------------------------------------  List of Common Bladder Irritants*  Alcoholic beverages  Apples and apple juice  Cantaloupe  Carbonated beverages  Chili and spicy foods  Chocolate  Citrus fruit  Coffee (including decaffeinated)  Cranberries and cranberry juice  Grapes  Guava  Milk Products: milk, cheese, cottage cheese, yogurt, ice cream  Peaches  Pineapple  Plums  Strawberries  Sugar especially artificial sweeteners, saccharin, aspartame, corn sweeteners, honey, fructose, sucrose, lactose  Tea  Tomatoes and tomato juice  Vitamin B complex  Vinegar  *Most people are not sensitive to ALL of these products; your goal is to find the foods that make YOUR symptoms worse.  ---------------------------------------------------------------------------------------------------    Low-acid fruit substitutions include apricots, papaya, pears and watermelon. Coffee drinkers can drink Kava or other lowacid instant drinks. Tea drinkers can substitute non-citrus herbal and sun brewed teas. Calcium carbonate co-buffered with calcium ascorbate can be substituted for Vitamin C. Prelief is a dietary supplement that works as an acid blocker for the bladder.    Where to get more  information:        Overcoming Bladder Disorders by Janett Marc and Romulo Neves, 1990        You Dont Have to Live with Cystitis! By Karla Grayson, 1988  · [email or web address]    -----------------------------------------------------------------    1)  Stage 2 cystocele/rectocele, stage 1 uterine prolapse:  --discussed pathophysiology  --uncertain this is contributing to her main issues (abdominal pain/bloating), nai since GYN exam did not help  --consider trial of pessary if desired; don't think would tolerate well currently due to levator tenderness  --surgical option: TVH/USS/A&P--use caution with h/o bowel resection J-G tube    2)  Levator myalgia:  --continue PT with Gaby; then switch to [place] location per her recc  --consider setting up with TENS for home use--will discuss with PT  --will send in vaginal/rectal suppositories to use before/after PT and daily as needed; will call you with pharmacy information  --continue talk therapy  --continue follow up with pain MD    3)  Abdominal pain/bloating, chronic constipation:  --follow up with Dr. Salinas  --once you learn how to relax muscles, start working on core strengthening    4)  Spontaneous urinary incontinence, concerning for urge incontinence:  --urine C&S  --Empty bladder every 2-3 hours.  Empty well: wait a minute, lean forward on toilet.    --Avoid dietary irritants (see sheet).  Keep diary x 3-5 days to determine your irritants.  --continue PT: goal to relax THEN strengthen muscles  --URGE: consider medication in future. Avoid anticholinergics due to constipation.  Takes 2-4 weeks to see if will have effect.  For dry mouth: get sour, sugar free lozenge or gum.      5)  Nocturia (nighttime urination): stop fluids 2 hours before bed/no water by bed.  If have leg swelling:  Elevate feet above chest x 1 hour before bed to get excess fluid off.  Can also use support hose (knee highs).    --keep sleep  appointment to work on PAIGE and insomnia    6)  RTC 3 months.

## 2018-07-20 ENCOUNTER — CLINICAL SUPPORT (OUTPATIENT)
Dept: REHABILITATION | Facility: HOSPITAL | Age: 38
End: 2018-07-20
Attending: INTERNAL MEDICINE
Payer: COMMERCIAL

## 2018-07-20 DIAGNOSIS — M62.89 PELVIC FLOOR DYSFUNCTION: Primary | ICD-10-CM

## 2018-07-20 DIAGNOSIS — R10.9 ABDOMINAL PAIN, UNSPECIFIED ABDOMINAL LOCATION: ICD-10-CM

## 2018-07-20 LAB — BACTERIA UR CULT: NO GROWTH

## 2018-07-20 PROCEDURE — 97140 MANUAL THERAPY 1/> REGIONS: CPT | Mod: PN
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
## 2018-07-22 PROBLEM — N39.41 URINARY INCONTINENCE, URGE: Status: ACTIVE | Noted: 2018-07-22

## 2018-07-22 PROBLEM — N81.11 CYSTOCELE, MIDLINE: Status: ACTIVE | Noted: 2018-07-22

## 2018-07-22 PROBLEM — M79.18 MYALGIA OF PELVIC FLOOR: Status: ACTIVE | Noted: 2018-07-22

## 2018-07-22 PROBLEM — K59.09 CHRONIC CONSTIPATION: Status: ACTIVE | Noted: 2018-07-22

## 2018-07-22 PROBLEM — N81.6 RECTOCELE, FEMALE: Status: ACTIVE | Noted: 2018-07-22

## 2018-07-22 PROBLEM — N81.4 UTEROVAGINAL PROLAPSE: Status: ACTIVE | Noted: 2018-07-22

## 2018-07-22 PROBLEM — R35.1 NOCTURIA MORE THAN TWICE PER NIGHT: Status: ACTIVE | Noted: 2018-07-22

## 2018-07-23 ENCOUNTER — TELEPHONE (OUTPATIENT)
Dept: UROGYNECOLOGY | Facility: CLINIC | Age: 38
End: 2018-07-23

## 2018-07-23 NOTE — TELEPHONE ENCOUNTER
Rx called in to [facility] [place]    [phone]     Vaginal suppositories   lidocaine 2%, valium 5 mg, baclofen 4% suppository   Disp: #12   Sig:  Apply 1 suppository vaginally 30 minutes before PT sessions and daily as needed for pain.   Refills: 12    Attempted to contact pt to inform her that her RX was called in to [facility] [place]. Pt did not answer. LM to call office

## 2018-07-23 NOTE — TELEPHONE ENCOUNTER
----- Message from Sophie Pinon sent at 7/23/2018  2:19 PM CDT -----  Contact: MELISSA PALOMINO [[id number]]            Name of Who is Calling: MELISSA PALOMINO [[id number]]      What is the request in detail: pt returning call back       Can the clinic reply by [facility]:no      What Number to Call Back if not in [facility]:[phone]

## 2018-07-23 NOTE — TELEPHONE ENCOUNTER
----- Message from Omayra Lance MD sent at 7/22/2018  4:43 PM CDT -----  Regarding: please call Rx into pharmacy, then call patient to let her know where to get  Please call in the following Rx to the following pharmacy.  Then, call patient to let her know phone #/name of pharmacy to purchase.  This suppositories may help her relax the pelvic floor muscles, which may make PT sessions more beneficial.  Thanks!    [facility] in    [phone]    Vaginal suppositories  lidocaine 2%, valium 5 mg, baclofen 4% suppository  Disp: #12  Sig:  Apply 1 suppository vaginally 30 minutes before PT sessions and daily as needed for pain.   Refills: 12

## 2018-07-23 NOTE — TELEPHONE ENCOUNTER
Pt was informed her Rx was called in to [facility] [place]    [phone]     Vaginal suppositories   lidocaine 2%, valium 5 mg, baclofen 4% suppository   Disp: #12   Sig:  Apply 1 suppository vaginally 30 minutes before PT sessions and daily as needed for pain.   Refills: 12.  Pt aware and verbalizes understanding.

## 2018-07-25 ENCOUNTER — PATIENT MESSAGE (OUTPATIENT)
Dept: REHABILITATION | Facility: HOSPITAL | Age: 38
End: 2018-07-25

## 2018-07-31 ENCOUNTER — OFFICE VISIT (OUTPATIENT)
Dept: GASTROENTEROLOGY | Facility: CLINIC | Age: 38
End: 2018-07-31
Payer: COMMERCIAL

## 2018-07-31 ENCOUNTER — TELEPHONE (OUTPATIENT)
Dept: GASTROENTEROLOGY | Facility: CLINIC | Age: 38
End: 2018-07-31

## 2018-07-31 VITALS
HEIGHT: 62 IN | WEIGHT: 161.81 LBS | SYSTOLIC BLOOD PRESSURE: 111 MMHG | BODY MASS INDEX: 29.78 KG/M2 | DIASTOLIC BLOOD PRESSURE: 69 MMHG | HEART RATE: 102 BPM

## 2018-07-31 DIAGNOSIS — R10.9 ABDOMINAL PAIN, UNSPECIFIED ABDOMINAL LOCATION: ICD-10-CM

## 2018-07-31 DIAGNOSIS — K59.00 CONSTIPATION, UNSPECIFIED CONSTIPATION TYPE: ICD-10-CM

## 2018-07-31 DIAGNOSIS — R11.2 NAUSEA AND VOMITING, INTRACTABILITY OF VOMITING NOT SPECIFIED, UNSPECIFIED VOMITING TYPE: ICD-10-CM

## 2018-07-31 DIAGNOSIS — R19.7 DIARRHEA, UNSPECIFIED TYPE: ICD-10-CM

## 2018-07-31 DIAGNOSIS — R14.0 BLOATING: ICD-10-CM

## 2018-07-31 DIAGNOSIS — K21.9 GASTROESOPHAGEAL REFLUX DISEASE, ESOPHAGITIS PRESENCE NOT SPECIFIED: Primary | ICD-10-CM

## 2018-07-31 PROCEDURE — 3008F BODY MASS INDEX DOCD: CPT | Mod: CPTII,S$GLB,, | Performed by: INTERNAL MEDICINE

## 2018-07-31 PROCEDURE — 99215 OFFICE O/P EST HI 40 MIN: CPT | Mod: S$GLB,,, | Performed by: INTERNAL MEDICINE

## 2018-07-31 PROCEDURE — 99999 PR PBB SHADOW E&M-EST. PATIENT-LVL IV: CPT | Mod: PBBFAC,,, | Performed by: INTERNAL MEDICINE

## 2018-07-31 RX ORDER — PROCHLORPERAZINE MALEATE 10 MG
10 TABLET ORAL 2 TIMES DAILY
COMMUNITY
Start: 2018-05-03 | End: 2019-02-16

## 2018-07-31 RX ORDER — ESOMEPRAZOLE MAGNESIUM 40 MG/1
40 CAPSULE, DELAYED RELEASE ORAL
Qty: 60 CAPSULE | Refills: 6 | Status: SHIPPED | OUTPATIENT
Start: 2018-07-31 | End: 2018-12-29

## 2018-07-31 RX ORDER — SCOLOPAMINE TRANSDERMAL SYSTEM 1 MG/1
1 PATCH, EXTENDED RELEASE TRANSDERMAL
Qty: 10 PATCH | Refills: 3 | Status: SHIPPED | OUTPATIENT
Start: 2018-07-31 | End: 2019-01-21
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
## 2018-07-31 NOTE — LETTER
July 31, 2018      Akanksha West MD  [address]  [address]  [facility] [address]           [facility]  [address]  [place] [address]  Phone: [phone]  Fax: [phone]          Patient: Lia Avila   MR Number: [id number]   YOB: 1980   Date of Visit: 7/31/2018       Dear Dr. Akanksha West:    Thank you for referring Lia Avila to me for evaluation. Attached you will find relevant portions of my assessment and plan of care.    If you have questions, please do not hesitate to call me. I look forward to following Lia Avila along with you.    Sincerely,    Apurva Salinas MD    Enclosure  CC:  No Recipients    If you would like to receive this communication electronically, please contact [email or web address] or [phone] to request more information on [email or web address] Link access.    For providers and/or their staff who would like to refer a patient to [facility], please contact us through our one-stop-shop provider referral line, [facility], at [phone].    If you feel you have received this communication in error or would no longer like to receive these types of communications, please e-mail [email or web address]

## 2018-08-07 ENCOUNTER — PATIENT MESSAGE (OUTPATIENT)
Dept: GASTROENTEROLOGY | Facility: CLINIC | Age: 38
End: 2018-08-07

## 2018-08-09 NOTE — TELEPHONE ENCOUNTER
Lia,     I will ask out wound care nurse to see you to check on the site. I suspect that you have granulation tissue that may need to be treated, she will be able to take care of it.    Thanks for the pictures.     Sincerely,   Dr. Salinas       pls arrange apt w wound care nurse ([facility])

## 2018-08-10 ENCOUNTER — NURSE TRIAGE (OUTPATIENT)
Dept: ADMINISTRATIVE | Facility: CLINIC | Age: 38
End: 2018-08-10

## 2018-08-10 ENCOUNTER — HOSPITAL ENCOUNTER (EMERGENCY)
Facility: HOSPITAL | Age: 38
Discharge: HOME | End: 2018-08-10
Attending: EMERGENCY MEDICINE
Payer: COMMERCIAL

## 2018-08-10 VITALS
WEIGHT: 166.75 LBS | HEART RATE: 94 BPM | HEIGHT: 62 IN | SYSTOLIC BLOOD PRESSURE: 110 MMHG | BODY MASS INDEX: 30.69 KG/M2 | DIASTOLIC BLOOD PRESSURE: 59 MMHG | OXYGEN SATURATION: 100 % | RESPIRATION RATE: 22 BRPM | TEMPERATURE: 98 F

## 2018-08-10 DIAGNOSIS — R11.2 NAUSEA AND VOMITING, INTRACTABILITY OF VOMITING NOT SPECIFIED, UNSPECIFIED VOMITING TYPE: Primary | ICD-10-CM

## 2018-08-10 LAB
ALBUMIN SERPL BCP-MCNC: 4.1 G/DL
ALP SERPL-CCNC: 68 U/L
ALT SERPL W/O P-5'-P-CCNC: 24 U/L
ANION GAP SERPL CALC-SCNC: 8 MMOL/L
AST SERPL-CCNC: 23 U/L
B-HCG UR QL: NEGATIVE
BASOPHILS # BLD AUTO: 0.02 K/UL
BASOPHILS NFR BLD: 0.3 %
BILIRUB SERPL-MCNC: 0.4 MG/DL
BILIRUB UR QL STRIP: NEGATIVE
BUN SERPL-MCNC: 9 MG/DL
CALCIUM SERPL-MCNC: 9.1 MG/DL
CHLORIDE SERPL-SCNC: 104 MMOL/L
CLARITY UR: CLEAR
CO2 SERPL-SCNC: 26 MMOL/L
COLOR UR: YELLOW
CREAT SERPL-MCNC: 0.8 MG/DL
DIFFERENTIAL METHOD: ABNORMAL
EOSINOPHIL # BLD AUTO: 0 K/UL
EOSINOPHIL NFR BLD: 0.4 %
ERYTHROCYTE [DISTWIDTH] IN BLOOD BY AUTOMATED COUNT: 13.6 %
EST. GFR  (AFRICAN AMERICAN): >60 ML/MIN/1.73 M^2
EST. GFR  (NON AFRICAN AMERICAN): >60 ML/MIN/1.73 M^2
GLUCOSE SERPL-MCNC: 96 MG/DL
GLUCOSE UR QL STRIP: NEGATIVE
HCT VFR BLD AUTO: 38.7 %
HGB BLD-MCNC: 13.3 G/DL
HGB UR QL STRIP: NEGATIVE
KETONES UR QL STRIP: NEGATIVE
LEUKOCYTE ESTERASE UR QL STRIP: NEGATIVE
LIPASE SERPL-CCNC: 13 U/L
LYMPHOCYTES # BLD AUTO: 1.4 K/UL
LYMPHOCYTES NFR BLD: 19.2 %
MCH RBC QN AUTO: 30.2 PG
MCHC RBC AUTO-ENTMCNC: 34.4 G/DL
MCV RBC AUTO: 88 FL
MONOCYTES # BLD AUTO: 0.4 K/UL
MONOCYTES NFR BLD: 5.7 %
NEUTROPHILS # BLD AUTO: 5.4 K/UL
NEUTROPHILS NFR BLD: 74.4 %
NITRITE UR QL STRIP: NEGATIVE
PH UR STRIP: 8 [PH] (ref 5–8)
PLATELET # BLD AUTO: 195 K/UL
PMV BLD AUTO: 10.8 FL
POTASSIUM SERPL-SCNC: 3.9 MMOL/L
PROT SERPL-MCNC: 6.7 G/DL
PROT UR QL STRIP: NEGATIVE
RBC # BLD AUTO: 4.41 M/UL
SODIUM SERPL-SCNC: 138 MMOL/L
SP GR UR STRIP: 1.01 (ref 1–1.03)
URN SPEC COLLECT METH UR: NORMAL
UROBILINOGEN UR STRIP-ACNC: NEGATIVE EU/DL
WBC # BLD AUTO: 7.25 K/UL

## 2018-08-10 PROCEDURE — 81003 URINALYSIS AUTO W/O SCOPE: CPT

## 2018-08-10 PROCEDURE — 81025 URINE PREGNANCY TEST: CPT

## 2018-08-10 PROCEDURE — 96375 TX/PRO/DX INJ NEW DRUG ADDON: CPT | Mod: 59

## 2018-08-10 PROCEDURE — 85025 COMPLETE CBC W/AUTO DIFF WBC: CPT

## 2018-08-10 PROCEDURE — 96374 THER/PROPH/DIAG INJ IV PUSH: CPT

## 2018-08-10 PROCEDURE — 63600175 PHARM REV CODE 636 W HCPCS: Performed by: FAMILY MEDICINE

## 2018-08-10 PROCEDURE — 63600175 PHARM REV CODE 636 W HCPCS: Performed by: EMERGENCY MEDICINE

## 2018-08-10 PROCEDURE — 96361 HYDRATE IV INFUSION ADD-ON: CPT

## 2018-08-10 PROCEDURE — 83690 ASSAY OF LIPASE: CPT

## 2018-08-10 PROCEDURE — 99284 EMERGENCY DEPT VISIT MOD MDM: CPT | Mod: 25

## 2018-08-10 PROCEDURE — 25000003 PHARM REV CODE 250: Performed by: EMERGENCY MEDICINE

## 2018-08-10 PROCEDURE — 80053 COMPREHEN METABOLIC PANEL: CPT

## 2018-08-10 PROCEDURE — 93010 ELECTROCARDIOGRAM REPORT: CPT | Mod: ,,, | Performed by: INTERNAL MEDICINE

## 2018-08-10 RX ORDER — ONDANSETRON 2 MG/ML
4 INJECTION INTRAMUSCULAR; INTRAVENOUS
Status: DISCONTINUED | OUTPATIENT
Start: 2018-08-10 | End: 2018-08-10 | Stop reason: SDUPTHER

## 2018-08-10 RX ORDER — KETOROLAC TROMETHAMINE 30 MG/ML
30 INJECTION, SOLUTION INTRAMUSCULAR; INTRAVENOUS
Status: COMPLETED | OUTPATIENT
Start: 2018-08-10 | End: 2018-08-10

## 2018-08-10 RX ORDER — ONDANSETRON 2 MG/ML
4 INJECTION INTRAMUSCULAR; INTRAVENOUS
Status: COMPLETED | OUTPATIENT
Start: 2018-08-10 | End: 2018-08-10

## 2018-08-10 RX ADMIN — ONDANSETRON 4 MG: 2 INJECTION, SOLUTION INTRAMUSCULAR; INTRAVENOUS at 04:08

## 2018-08-10 RX ADMIN — SODIUM CHLORIDE 1000 ML: 0.9 INJECTION, SOLUTION INTRAVENOUS at 04:08

## 2018-08-10 RX ADMIN — KETOROLAC TROMETHAMINE 30 MG: 30 INJECTION, SOLUTION INTRAMUSCULAR at 05:08

## 2018-08-10 NOTE — ED NOTES
Confirmed with Dr. May that second set of 1L ns fluids, ondansetron, and lipase were duplicate orders and can be cancelled.

## 2018-08-10 NOTE — ED PROVIDER NOTES
SCRIBE #1 NOTE: I, Corinne Mack, am scribing for, and in the presence of, Larry Lau MD. I have scribed the HPI, ROS, and PEx.     SCRIBE #2 NOTE: I, Allyson Styles, am scribing for, and in the presence of,  Cynthia May MD. I have scribed the remaining portions of the note not scribed by Scribe #1.     History      Chief Complaint   Patient presents with    Vomiting     sent by pcp       Review of patient's allergies indicates:   Allergen Reactions    Sulfa (sulfonamide antibiotics)         HPI   HPI    8/10/2018, 3:40 PM   History obtained from the patient      History of Present Illness: Lia Avila is a 38 y.o. female patient who is s/p bowel resection with PMHx of aerophagia, IBS, and gastroparesis who presents to the Emergency Department for confusion which onset today. Pt reports she has chronic issues with N/V that have been worsening in the last few days. Pt has a G-tube and reports pain around her G-tube that onset on Monday. Pt was seen by her PCP and had an US and xray done. Pt's US was negative and her x-ray only showed constipation. Pt presented to the ED today because she got in her car this morning and could not remember how to start it. When the pt got to work she could not remember how to turn on her computer or her password to her desktop. Symptoms are episodic and moderate in severity. No mitigating or exacerbating factors reported. Associated sxs include N/V, abd pain, and constipation. Pt's last BM was 6 days ago. Patient denies any fever, chills, CP, SOB, diarrhea, back pain, neck pain, HA, dizziness, and all other sxs at this time. No prior Tx reported. No further complaints or concerns at this time.       Arrival mode: Personal vehicle    PCP: Akanksha West MD       Past Medical History:  Past Medical History:   Diagnosis Date    Acid reflux     Aerophagia     Alcoholic     recovering    Anxiety     Depression     Functional gastrointestinal disorder     IBS  (irritable bowel syndrome)     Irritable bowel syndrome     Sleep apnea     Trivial aortic valve anomaly        Past Surgical History:  Past Surgical History:   Procedure Laterality Date    BACK SURGERY      fusion of L4, L5, and S1    CHOLECYSTECTOMY      COLON SURGERY      COLONOSCOPY      COLONOSCOPY N/A 4/13/2018    Procedure: Colonoscopy;  Surgeon: Apurva Salinas MD;  Location: [facility] ([address] [place] [address]);  Service: Endoscopy;  Laterality: N/A;  5 days of full liquids then 24 hours clear liquids    GASTROSTOMY-JEJEUNOSTOMY TUBE CHANGE/PLACEMENT      SMALL INTESTINE SURGERY      UPPER GASTROINTESTINAL ENDOSCOPY           Family History:  Family History   Problem Relation Age of Onset    Hypertension Mother     Cancer Father 49        stomach CA    Stomach cancer Father 49    Liver cancer Father     Rectal cancer Maternal Grandmother         dx in 70s    Celiac disease Neg Hx     Cirrhosis Neg Hx     Colon cancer Neg Hx     Colon polyps Neg Hx     Crohn's disease Neg Hx     Cystic fibrosis Neg Hx     Esophageal cancer Neg Hx     Hemochromatosis Neg Hx     Inflammatory bowel disease Neg Hx     Irritable bowel syndrome Neg Hx     Liver disease Neg Hx     Ulcerative colitis Neg Hx     Dain's disease Neg Hx     Lymphoma Neg Hx     Tuberculosis Neg Hx     Scleroderma Neg Hx     Rheum arthritis Neg Hx     Multiple sclerosis Neg Hx     Melanoma Neg Hx     Lupus Neg Hx     Psoriasis Neg Hx     Skin cancer Neg Hx        Social History:  Social History     Social History Main Topics    Smoking status: Former Smoker     Packs/day: 0.25     Quit date: 12/3/2016    Smokeless tobacco: Never Used    Alcohol use No      Comment: pt states that she is a recoveirng alcoholic, last drink 7-2-11    Drug use: No    Sexual activity: unknown       ROS   Review of Systems   Constitutional: Negative for chills and fever.   Respiratory: Negative for cough and shortness of breath.    Cardiovascular:  "Negative for chest pain and leg swelling.   Gastrointestinal: Positive for abdominal pain (around G-tube), constipation, nausea and vomiting. Negative for diarrhea.   Musculoskeletal: Negative for back pain, neck pain and neck stiffness.   Skin: Negative for rash and wound.   Neurological: Negative for dizziness, light-headedness, numbness and headaches.   Psychiatric/Behavioral: Positive for confusion (episodic).   All other systems reviewed and are negative.    Physical Exam      Initial Vitals [08/10/18 1538]   BP Pulse Resp Temp SpO2   (!) 140/71 100 18 98.1 °F (36.7 °C) 98 %      MAP       --          Physical Exam  Nursing Notes and Vital Signs Reviewed.  Constitutional: Patient is in no apparent distress. Well-developed and well-nourished.  Head: Atraumatic. Normocephalic.  Eyes: PERRL. EOM intact. Conjunctivae are not pale. No scleral icterus.  ENT: Mucous membranes are mildly dry. Oropharynx is clear and symmetric.    Neck: Supple. Full ROM. No lymphadenopathy.  Cardiovascular: Regular rate. Regular rhythm. No murmurs, rubs, or gallops. Distal pulses are 2+ and symmetric.  Pulmonary/Chest: No respiratory distress. Clear to auscultation bilaterally. No wheezing or rales.  Abdominal: Soft and mildly distended.  There is diffuse tenderness.  No rebound, guarding, or rigidity. G-tube in place.  Musculoskeletal: Moves all extremities. No obvious deformities.   Skin: Warm and dry.  Neurological:  Alert, awake, and appropriate.  Normal speech.  No acute focal neurological deficits are appreciated.  Psychiatric: Normal affect. Good eye contact. Appropriate in content.    ED Course    Procedures  ED Vital Signs:  Vitals:    08/10/18 1538 08/10/18 1631 08/10/18 1716   BP: (!) 140/71 120/67 (!) 110/59   Pulse: 100 94 94   Resp: 18 (!) 21 (!) 22   Temp: 98.1 °F (36.7 °C)     TempSrc: Oral     SpO2: 98% 100% 100%   Weight: 75.7 kg (166 lb 12.5 oz)     Height: 5' 2" (1.575 m)         Abnormal Lab Results:  Labs Reviewed "   CBC W/ AUTO DIFFERENTIAL - Abnormal; Notable for the following:        Result Value    Gran% 74.4 (*)     All other components within normal limits   COMPREHENSIVE METABOLIC PANEL   LIPASE   URINALYSIS   PREGNANCY TEST, URINE RAPID   URINALYSIS        All Lab Results:  Results for orders placed or performed during the hospital encounter of 08/10/18   CBC W/ AUTO DIFFERENTIAL   Result Value Ref Range    WBC 7.25 3.90 - 12.70 K/uL    RBC 4.41 4.00 - 5.40 M/uL    Hemoglobin 13.3 12.0 - 16.0 g/dL    Hematocrit 38.7 37.0 - 48.5 %    MCV 88 82 - 98 fL    MCH 30.2 27.0 - 31.0 pg    MCHC 34.4 32.0 - 36.0 g/dL    RDW 13.6 11.5 - 14.5 %    Platelets 195 150 - 350 K/uL    MPV 10.8 9.2 - 12.9 fL    Gran # (ANC) 5.4 1.8 - 7.7 K/uL    Lymph # 1.4 1.0 - 4.8 K/uL    Mono # 0.4 0.3 - 1.0 K/uL    Eos # 0.0 0.0 - 0.5 K/uL    Baso # 0.02 0.00 - 0.20 K/uL    Gran% 74.4 (H) 38.0 - 73.0 %    Lymph% 19.2 18.0 - 48.0 %    Mono% 5.7 4.0 - 15.0 %    Eosinophil% 0.4 0.0 - 8.0 %    Basophil% 0.3 0.0 - 1.9 %    Differential Method Automated    Comp. Metabolic Panel   Result Value Ref Range    Sodium 138 136 - 145 mmol/L    Potassium 3.9 3.5 - 5.1 mmol/L    Chloride 104 95 - 110 mmol/L    CO2 26 23 - 29 mmol/L    Glucose 96 70 - 110 mg/dL    BUN, Bld 9 6 - 20 mg/dL    Creatinine 0.8 0.5 - 1.4 mg/dL    Calcium 9.1 8.7 - 10.5 mg/dL    Total Protein 6.7 6.0 - 8.4 g/dL    Albumin 4.1 3.5 - 5.2 g/dL    Total Bilirubin 0.4 0.1 - 1.0 mg/dL    Alkaline Phosphatase 68 55 - 135 U/L    AST 23 10 - 40 U/L    ALT 24 10 - 44 U/L    Anion Gap 8 8 - 16 mmol/L    eGFR if African American >60 >60 mL/min/1.73 m^2    eGFR if non African American >60 >60 mL/min/1.73 m^2   Lipase   Result Value Ref Range    Lipase 13 4 - 60 U/L   Urinalysis - Clean Catch   Result Value Ref Range    Specimen UA Urine, Clean Catch     Color, UA Yellow Yellow, Straw, Leola    Appearance, UA Clear Clear    pH, UA 8.0 5.0 - 8.0    Specific Gravity, UA 1.010 1.005 - 1.030    Protein, UA  Negative Negative    Glucose, UA Negative Negative    Ketones, UA Negative Negative    Bilirubin (UA) Negative Negative    Occult Blood UA Negative Negative    Nitrite, UA Negative Negative    Urobilinogen, UA Negative <2.0 EU/dL    Leukocytes, UA Negative Negative   Pregnancy, urine rapid   Result Value Ref Range    Preg Test, Ur Negative        Imaging Results:  Imaging Results          X-Ray Abdomen Flat And Erect (Final result)  Result time 08/10/18 17:44:24    Final result by Sam Leonard MD (08/10/18 17:44:24)                 Impression:      Nonobstructive bowel gas pattern.      Electronically signed by: Sam Leonard MD  Date:    08/10/2018  Time:    17:44             Narrative:    EXAMINATION:  XR ABDOMEN FLAT AND ERECT    CLINICAL HISTORY:  Generalized abdominal pain.;    FINDINGS:  Comparison study 08/23/2017.  The lung bases are clear.  There is no intraperitoneal free air.  There is a nonobstructive bowel gas pattern.  Average stool.  There are a few air-filled nondilated loops of small bowel.  No air-fluid levels on upright imaging.  There is a gastrostomy tube again noted.  Lumbosacral spinal fusion.  Cholecystectomy.  Left upper quadrant surgical staple like very                                 The EKG was ordered, reviewed, and independently interpreted by the ED provider.  Interpretation time: 1600  Rate: 91 BPM  Rhythm: normal sinus rhythm  Interpretation: No acute ST changes. No STEMI.             The Emergency Provider reviewed the vital signs and test results, which are outlined above.    ED Discussion     3:57 PM: Dr. Lau transfers care of pt to Dr. May, pending lab results.    4:50 pm: Evaluated pt at this time. Pt reports constipation. Last BM was 6 days ago. She reports increased abd pain and pain at her G-tube site. She has a PMHx of SBO requiring bowel resection. Pt is also c/o n/v and abd distention. She only has liquid PO intake, no solids. Her care is followed by Dr. Salinas  (GI).    6:43 PM: Reassessed pt at this time.  Pt states her condition has improved at this time. Instructed pt to f/u with Dr. Nation OP. Discussed with pt all pertinent ED information and results. Offered to prescribe pt Zofran. She states Zofran does not work for her and she normally takes compazine. She has compazine at home and does not need an Rx. Discussed pt dx and plan of tx. Gave pt all f/u and return to the ED instructions. All questions and concerns were addressed at this time. Pt expresses understanding of information and instructions, and is comfortable with plan to discharge. Pt is stable for discharge.    I discussed with patient and/or family/caretaker that evaluation in the ED does not suggest any emergent or life threatening medical conditions requiring immediate intervention beyond what was provided in the ED, and I believe patient is safe for discharge.  Regardless, an unremarkable evaluation in the ED does not preclude the development or presence of a serious of life threatening condition. As such, patient was instructed to return immediately for any worsening or change in current symptoms.    ED Medication(s):  Medications   sodium chloride 0.9% bolus 1,000 mL (1,000 mLs Intravenous New Bag 8/10/18 1653)   ondansetron injection 4 mg (4 mg Intravenous Given 8/10/18 1653)   ketorolac injection 30 mg (30 mg Intravenous Given 8/10/18 9327)       New Prescriptions    No medications on file       Follow-up Information     DR. NATION. Schedule an appointment as soon as possible for a visit in 1 day.                   Medical Decision Making    Medical Decision Making:   Clinical Tests:   Lab Tests: Ordered and Reviewed  Radiological Study: Reviewed and Ordered  Medical Tests: Reviewed and Ordered           Scribe Attestation:   Scribe #1: I performed the above scribed service and the documentation accurately describes the services I performed. I attest to the accuracy of the note.    Attending:    Physician Attestation Statement for Scribe #1: I, Larry Lau MD, personally performed the services described in this documentation, as scribed by Corinne Mack, in my presence, and it is both accurate and complete.       Scribe Attestation:   Scribe #2: I performed the above scribed service and the documentation accurately describes the services I performed. I attest to the accuracy of the note.    Attending Attestation:           Physician Attestation for Scribe:    Physician Attestation Statement for Scribe #2: I, Cynthia May MD, reviewed documentation, as scribed by Allyson Styles in my presence, and it is both accurate and complete. I also acknowledge and confirm the content of the note done by Scribe #1.          Clinical Impression       ICD-10-CM ICD-9-CM   1. Nausea and vomiting, intractability of vomiting not specified, unspecified vomiting type R11.2 787.01       Disposition:   Disposition: Discharged  Condition: Stable           Cynthia May MD  08/10/18 4791

## 2018-08-10 NOTE — TELEPHONE ENCOUNTER
Reason for Disposition   Patient sounds very sick or weak to the triager    Protocols used:  CONSTIPATION-A-[place]    Patient called with complaint of severe rectal pain, and she is taking Lynzess for chronic constipation. She is at work currently and her GI md Dr. Salinas is in [place]. She has not had a bm in 6 days and she is on pain management. However, she reports that her G tube site is hurting and this has never occurred over the 8 years she has had it. Patient states she is having bouts of confusion, but she appears to be very anxious right now. Patient advised to go to the ED now. She verbalized understanding.

## 2018-08-10 NOTE — ED NOTES
Pt resting in ER stretcher, aaox4, rr e/u, uncomfortable, NAD noted. Pt remains on cardiac monitor with vss noted. Bed low and locked, call light in reach, side rails up x2. Pt verbalized understanding of status and POC; denies further needs. Will continue to monitor.

## 2018-08-11 ENCOUNTER — PATIENT MESSAGE (OUTPATIENT)
Dept: GASTROENTEROLOGY | Facility: CLINIC | Age: 38
End: 2018-08-11

## 2018-08-11 DIAGNOSIS — R10.84 GENERALIZED ABDOMINAL PAIN: ICD-10-CM

## 2018-08-13 ENCOUNTER — TELEPHONE (OUTPATIENT)
Dept: GASTROENTEROLOGY | Facility: CLINIC | Age: 38
End: 2018-08-13

## 2018-08-13 ENCOUNTER — PATIENT MESSAGE (OUTPATIENT)
Dept: GASTROENTEROLOGY | Facility: CLINIC | Age: 38
End: 2018-08-13

## 2018-08-13 NOTE — TELEPHONE ENCOUNTER
PLs arrange CT abd ASAP (within days)   PLs check on apt ramon Crockett - wound care RN.  She needs to be seen asap  Schedule apt ramon Balderas in 3 weeks

## 2018-08-14 ENCOUNTER — HOSPITAL ENCOUNTER (OUTPATIENT)
Dept: RADIOLOGY | Facility: HOSPITAL | Age: 38
Discharge: HOME | End: 2018-08-14
Attending: INTERNAL MEDICINE
Payer: COMMERCIAL

## 2018-08-14 ENCOUNTER — INITIAL CONSULT (OUTPATIENT)
Dept: WOUND CARE | Facility: CLINIC | Age: 38
End: 2018-08-14
Payer: COMMERCIAL

## 2018-08-14 DIAGNOSIS — R10.84 GENERALIZED ABDOMINAL PAIN: ICD-10-CM

## 2018-08-14 DIAGNOSIS — K94.20 GASTROSTOMY COMPLICATION: Primary | ICD-10-CM

## 2018-08-14 PROCEDURE — 74177 CT ABD & PELVIS W/CONTRAST: CPT | Mod: TC

## 2018-08-14 PROCEDURE — 99999 PR PBB SHADOW E&M-EST. PATIENT-LVL II: CPT | Mod: PBBFAC,,, | Performed by: CLINICAL NURSE SPECIALIST

## 2018-08-14 PROCEDURE — 74177 CT ABD & PELVIS W/CONTRAST: CPT | Mod: 26,,, | Performed by: RADIOLOGY

## 2018-08-14 PROCEDURE — 99202 OFFICE O/P NEW SF 15 MIN: CPT | Mod: S$GLB,,, | Performed by: CLINICAL NURSE SPECIALIST

## 2018-08-14 PROCEDURE — 25500020 PHARM REV CODE 255: Performed by: INTERNAL MEDICINE

## 2018-08-14 RX ADMIN — IOHEXOL 15 ML: 350 INJECTION, SOLUTION INTRAVENOUS at 05:08

## 2018-08-14 RX ADMIN — IOHEXOL 75 ML: 350 INJECTION, SOLUTION INTRAVENOUS at 06:08

## 2018-08-14 RX ADMIN — IOHEXOL 15 ML: 350 INJECTION, SOLUTION INTRAVENOUS at 06:08

## 2018-08-14 NOTE — LETTER
August 16, 2018      Apurva Salinas MD  [address]  [facility] [address]           [facility]  [address]  [facility] [address]  Phone: [phone]          Patient: Lia Avila   MR Number: [id number]   YOB: 1980   Date of Visit: 8/14/2018       Dear Dr. Apurva Salinas:    Thank you for referring Lia Avila to me for evaluation. Attached you will find relevant portions of my assessment and plan of care.    If you have questions, please do not hesitate to call me. I look forward to following Lia Avila along with you.    Sincerely,    Keira Van, CNS    Enclosure  CC:  No Recipients    If you would like to receive this communication electronically, please contact [email or web address] or [phone] to request more information on [employer] access.    For providers and/or their staff who would like to refer a patient to [facility], please contact us through our one-stop-shop provider referral line, [facility] , at [phone].    If you feel you have received this communication in error or would no longer like to receive these types of communications, please e-mail [email or web address]

## 2018-08-16 NOTE — PROGRESS NOTES
Subjective:       Patient ID: Lia Avila is a 38 y.o. female.    Chief Complaint: Gastrostomy    This is new pt sent to me by her GI team, she has Gtube and pain in abdomen, that was worsened in past few days , she made a ED trip and nothing found, she is not sure why she is here but was told to come see me      Review of Systems   Constitutional: Negative for activity change, appetite change and fever.   HENT: Negative.    Respiratory: Negative.    Cardiovascular: Negative.    Gastrointestinal: Positive for abdominal pain.   Genitourinary: Negative.    Skin: Negative for rash.       Objective:      Physical Exam   Constitutional: She appears well-developed.   Pulmonary/Chest: Effort normal.   Abdominal: Soft. She exhibits distension. There is no tenderness.     has silicone gtube and has intermittent distension, pain is IN abd and not at site of tube, skin is clear, so I cannot say why she has this pain, may be scar tissue inside as tube does get manipulated a lot per her.   Gave her smaller end piece for decompression   Gave her silicone tape to try as she tapes it at all time s  Assessment:       1. Gastrostomy complication        Plan:       Suggestions made for management purposes  To discuss pain with her GI team  She was appreciative of tips and suggestions I made that she had not considered previously  I have reviewed the plan of care with the patient and she express understanding. I spent over 50% of this 20 minute visit in face to face counseling.

## 2018-09-05 ENCOUNTER — PATIENT MESSAGE (OUTPATIENT)
Dept: SURGERY | Facility: CLINIC | Age: 38
End: 2018-09-05

## 2018-09-11 ENCOUNTER — OFFICE VISIT (OUTPATIENT)
Dept: GASTROENTEROLOGY | Facility: CLINIC | Age: 38
End: 2018-09-11
Payer: COMMERCIAL

## 2018-09-11 VITALS
HEIGHT: 62 IN | DIASTOLIC BLOOD PRESSURE: 81 MMHG | WEIGHT: 164.69 LBS | HEART RATE: 69 BPM | BODY MASS INDEX: 30.31 KG/M2 | SYSTOLIC BLOOD PRESSURE: 135 MMHG

## 2018-09-11 DIAGNOSIS — R10.84 ABDOMINAL PAIN, GENERALIZED: ICD-10-CM

## 2018-09-11 DIAGNOSIS — R07.89 CHEST PAIN, NON-CARDIAC: Primary | ICD-10-CM

## 2018-09-11 DIAGNOSIS — K59.00 CONSTIPATION, UNSPECIFIED CONSTIPATION TYPE: ICD-10-CM

## 2018-09-11 DIAGNOSIS — K21.9 GASTROESOPHAGEAL REFLUX DISEASE WITHOUT ESOPHAGITIS: ICD-10-CM

## 2018-09-11 DIAGNOSIS — R19.7 DIARRHEA, UNSPECIFIED TYPE: ICD-10-CM

## 2018-09-11 DIAGNOSIS — R11.2 NAUSEA AND VOMITING, INTRACTABILITY OF VOMITING NOT SPECIFIED, UNSPECIFIED VOMITING TYPE: ICD-10-CM

## 2018-09-11 DIAGNOSIS — R15.9 INCONTINENCE OF FECES, UNSPECIFIED FECAL INCONTINENCE TYPE: ICD-10-CM

## 2018-09-11 DIAGNOSIS — R13.19 ESOPHAGEAL DYSPHAGIA: ICD-10-CM

## 2018-09-11 DIAGNOSIS — K43.9 VENTRAL HERNIA WITHOUT OBSTRUCTION OR GANGRENE: ICD-10-CM

## 2018-09-11 DIAGNOSIS — R09.A2 GLOBUS SENSATION: ICD-10-CM

## 2018-09-11 PROCEDURE — 99214 OFFICE O/P EST MOD 30 MIN: CPT | Mod: S$GLB,,, | Performed by: NURSE PRACTITIONER

## 2018-09-11 PROCEDURE — 99999 PR PBB SHADOW E&M-EST. PATIENT-LVL V: CPT | Mod: PBBFAC,,, | Performed by: NURSE PRACTITIONER

## 2018-09-11 PROCEDURE — 3008F BODY MASS INDEX DOCD: CPT | Mod: CPTII,S$GLB,, | Performed by: NURSE PRACTITIONER

## 2018-09-11 RX ORDER — MIRTAZAPINE 15 MG/1
15 TABLET, FILM COATED ORAL
COMMUNITY
Start: 2018-09-07 | End: 2018-11-30

## 2018-09-11 NOTE — PATIENT INSTRUCTIONS
Cyclic Vomiting Syndrome   -Start Co Q10 200 mg Three times daily  -Start L-carnitine 1 gram twice daily  -Start riboflavin 200mg twice daily
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
## 2018-09-14 ENCOUNTER — PATIENT MESSAGE (OUTPATIENT)
Dept: GASTROENTEROLOGY | Facility: CLINIC | Age: 38
End: 2018-09-14

## 2018-09-21 ENCOUNTER — PATIENT MESSAGE (OUTPATIENT)
Dept: GASTROENTEROLOGY | Facility: CLINIC | Age: 38
End: 2018-09-21

## 2018-09-24 ENCOUNTER — TELEPHONE (OUTPATIENT)
Dept: GASTROENTEROLOGY | Facility: CLINIC | Age: 38
End: 2018-09-24

## 2018-09-24 NOTE — TELEPHONE ENCOUNTER
----- Message from Vale Wootenkert sent at 9/24/2018  2:32 PM CDT -----  Contact: [phone]  lammi - severe constipation - no bm in 9 days - is on linzess 290 -please call patient at

## 2018-09-24 NOTE — TELEPHONE ENCOUNTER
She should add miralax daily to the Linzess. If no BM for three days, she can increase miralax to BID, than TID.

## 2018-10-03 ENCOUNTER — TELEPHONE (OUTPATIENT)
Dept: GASTROENTEROLOGY | Facility: CLINIC | Age: 38
End: 2018-10-03

## 2018-10-03 DIAGNOSIS — R10.9 ABDOMINAL PAIN, UNSPECIFIED ABDOMINAL LOCATION: ICD-10-CM

## 2018-10-03 DIAGNOSIS — R11.2 NAUSEA AND VOMITING, INTRACTABILITY OF VOMITING NOT SPECIFIED, UNSPECIFIED VOMITING TYPE: Primary | ICD-10-CM

## 2018-10-03 DIAGNOSIS — R68.81 EARLY SATIETY: ICD-10-CM

## 2018-10-03 NOTE — TELEPHONE ENCOUNTER
----- Message from Uri Leonard MA sent at 10/3/2018  3:30 PM CDT -----      ----- Message -----  From: Uri Leonard MA  Sent: 9/11/2018   4:32 PM  To: Jose Miguel Sharma (Ne[facility]ali)    Thank you.  ----- Message -----  From: Yoli Leonard MA  Sent: 9/11/2018   4:21 PM  To: Uri Leonard MA    It shows that this was denied.         ----- Message -----  From: Uri Leonard MA  Sent: 9/11/2018   4:08 PM  To: Jose Miguel Sharma (Neptali)    Hi,    What is the status of this pt Smart Pill?

## 2018-10-03 NOTE — TELEPHONE ENCOUNTER
She will need a gastric emptying study since the smart pill appeal was denied. Order placed. Please contact pt for coordination.    Thanks,  Tiff, NP

## 2018-10-08 ENCOUNTER — HOSPITAL ENCOUNTER (OUTPATIENT)
Facility: HOSPITAL | Age: 38
Discharge: HOME | End: 2018-10-08
Attending: INTERNAL MEDICINE | Admitting: INTERNAL MEDICINE
Payer: COMMERCIAL

## 2018-10-08 ENCOUNTER — OFFICE VISIT (OUTPATIENT)
Dept: SURGERY | Facility: CLINIC | Age: 38
End: 2018-10-08
Payer: COMMERCIAL

## 2018-10-08 VITALS
BODY MASS INDEX: 29.44 KG/M2 | OXYGEN SATURATION: 97 % | SYSTOLIC BLOOD PRESSURE: 132 MMHG | DIASTOLIC BLOOD PRESSURE: 65 MMHG | RESPIRATION RATE: 16 BRPM | TEMPERATURE: 98 F | HEART RATE: 75 BPM | HEIGHT: 62 IN | WEIGHT: 160 LBS

## 2018-10-08 VITALS
SYSTOLIC BLOOD PRESSURE: 108 MMHG | BODY MASS INDEX: 31.26 KG/M2 | HEART RATE: 85 BPM | TEMPERATURE: 98 F | DIASTOLIC BLOOD PRESSURE: 63 MMHG | WEIGHT: 169.88 LBS | HEIGHT: 62 IN

## 2018-10-08 DIAGNOSIS — K21.9 GERD (GASTROESOPHAGEAL REFLUX DISEASE): ICD-10-CM

## 2018-10-08 DIAGNOSIS — K43.2 INCISIONAL HERNIA, WITHOUT OBSTRUCTION OR GANGRENE: Primary | ICD-10-CM

## 2018-10-08 LAB
B-HCG UR QL: NEGATIVE
CTP QC/QA: YES

## 2018-10-08 PROCEDURE — 99212 OFFICE O/P EST SF 10 MIN: CPT | Mod: S$GLB,,, | Performed by: SURGERY

## 2018-10-08 PROCEDURE — 91010 ESOPHAGUS MOTILITY STUDY: CPT | Performed by: INTERNAL MEDICINE

## 2018-10-08 PROCEDURE — 99999 PR PBB SHADOW E&M-EST. PATIENT-LVL IV: CPT | Mod: PBBFAC,,, | Performed by: SURGERY

## 2018-10-08 PROCEDURE — 91037 ESOPH IMPED FUNCTION TEST: CPT | Mod: 26,,, | Performed by: INTERNAL MEDICINE

## 2018-10-08 PROCEDURE — 3008F BODY MASS INDEX DOCD: CPT | Mod: CPTII,S$GLB,, | Performed by: SURGERY

## 2018-10-08 PROCEDURE — 81025 URINE PREGNANCY TEST: CPT | Performed by: INTERNAL MEDICINE

## 2018-10-08 PROCEDURE — 91037 ESOPH IMPED FUNCTION TEST: CPT | Performed by: INTERNAL MEDICINE

## 2018-10-08 PROCEDURE — 25000003 PHARM REV CODE 250: Performed by: INTERNAL MEDICINE

## 2018-10-08 PROCEDURE — 91010 ESOPHAGUS MOTILITY STUDY: CPT | Mod: 26,,, | Performed by: INTERNAL MEDICINE

## 2018-10-08 RX ORDER — QUETIAPINE FUMARATE 50 MG/1
TABLET, EXTENDED RELEASE ORAL
COMMUNITY
Start: 2018-10-07 | End: 2018-12-15

## 2018-10-08 RX ORDER — LIDOCAINE HYDROCHLORIDE 20 MG/ML
JELLY TOPICAL ONCE
Status: COMPLETED | OUTPATIENT
Start: 2018-10-08 | End: 2018-10-08

## 2018-10-08 RX ADMIN — LIDOCAINE HYDROCHLORIDE 10 ML: 20 JELLY TOPICAL at 12:10

## 2018-10-08 NOTE — LETTER
October 8, 2018      Imelda Balderas, TIMOTHY  [address]  [place] [address]           [facility] - General Surgery  [address]  [place] [address]  Phone: [phone]          Patient: Lia Avila   MR Number: [id number]   YOB: 1980   Date of Visit: 10/8/2018       Dear Imelda Balderas:    Thank you for referring Lia Avila to me for evaluation. Attached you will find relevant portions of my assessment and plan of care.    If you have questions, please do not hesitate to call me. I look forward to following Lia Avila along with you.    Sincerely,    Ever Traylor MD    Enclosure  CC:  No Recipients    If you would like to receive this communication electronically, please contact [email or web address] or [phone] to request more information on [employer] Link access.    For providers and/or their staff who would like to refer a patient to [facility], please contact us through our one-stop-shop provider referral line, Ramiro Barnard, at [phone].    If you feel you have received this communication in error or would no longer like to receive these types of communications, please e-mail [email or web address]

## 2018-10-08 NOTE — PROGRESS NOTES
GENERAL SURGERY  Clinic Note        SUBJECTIVE:     HISTORY OF PRESENT ILLNESS:  Lia Avila is a 38 y.o. female who has been referred to surgery clinic for evaluation of chronic abdominal pain and abdominal CT scan with evidence of ventral hernias with non-obstructive bowel. Patient has extensive PMH including 8 year history of functional intestinal problems, reportedly diagnosed with gastroparesis though prior emptying study within normal limits. Reports daily nausea, vomiting and bloating requiring G tube placement, which she still uses multiple times a day for decompression. Additionally, she notes history of 3 small bowel obstructions, one requiring ex-lap and SBR in January 2017. She follows with Dr. Salinas in GI for her functional GI disorder. She notes chronic constipation requiring manual disimpaction and can go 5-10 days without bowel function. Her hernias remain reducible. She is a non-smoker.       MEDICATIONS:  Home Medications:  Current Outpatient Medications on File Prior to Visit   Medication Sig Dispense Refill    atorvastatin (LIPITOR) 20 MG tablet 20 mg once daily.   1    dextroamphetamine-amphetamine (ADDERALL) 20 mg tablet Take 1.5 tabs every morning, 1 tab 4-5 hours after that and another half tab later each day.      diazePAM (VALIUM) 10 MG Tab Take 10 mg by mouth as needed.      duloxetine (CYMBALTA) 60 MG capsule Take 120 mg by mouth once daily.       ERGOCALCIFEROL, VITAMIN D2, (VITAMIN D ORAL) Take 1,000 mg by mouth once daily.      esomeprazole (NEXIUM) 40 MG capsule Take 1 capsule (40 mg total) by mouth 2 (two) times daily before meals. 60 capsule 6    ferrous gluconate (FERGON) 324 MG tablet Take 324 mg by mouth.      hydrocodone-acetaminophen 10-325mg (NORCO)  mg Tab Take by mouth every 6 (six) hours as needed for Pain.      linaclotide (LINZESS) 72 mcg Cap Take 1 capsule (72 mcg total) by mouth once daily. 30 capsule 6    LINZESS 290 mcg Cap       metformin  (GLUCOPHAGE) 500 MG tablet Take 500 mg by mouth 2 (two) times daily with meals.       mirtazapine (REMERON) 15 MG tablet Take 15 mg by mouth.      prochlorperazine (COMPAZINE) 10 MG tablet Take 10 mg by mouth 2 (two) times daily.      quetiapine (SEROQUEL XR) 300 MG Tb24 Take 100 mg by mouth once daily.       quetiapine 50 mg oral tb24 (SEROQUEL XR) 50 mg Tb24       scopolamine (TRANSDERM-SCOP) 1.3-1.5 mg (1 mg over 3 days) Place 1 patch onto the skin every 72 hours. 10 patch 3    trazodone (DESYREL) 150 MG tablet Take 50 mg by mouth every evening.        Current Facility-Administered Medications on File Prior to Visit   Medication Dose Route Frequency Provider Last Rate Last Dose    [COMPLETED] lidocaine HCl 2% urojet   Mucous Membrane Once Apurva Salinas MD   10 mL at 10/08/18 1209       ALLERGIES:    Review of patient's allergies indicates:   Allergen Reactions    Sulfa (sulfonamide antibiotics)        PAST MEDICAL HISTORY:    Past Medical History:   Diagnosis Date    Acid reflux     Aerophagia     Alcoholic     recovering    Anxiety     Depression     Functional gastrointestinal disorder     IBS (irritable bowel syndrome)     Irritable bowel syndrome     Sleep apnea     Trivial aortic valve anomaly        SURGICAL HISTORY:  Past Surgical History:   Procedure Laterality Date    BACK SURGERY      fusion of L4, L5, and S1    CHEST TUBE PLACEMENT Left 1/16/2017    Performed by Louis O. Jeansonne IV, [facility] at [facility] OR    CHOLECYSTECTOMY      COLON SURGERY      COLONOSCOPY      COLONOSCOPY N/A 4/13/2018    Procedure: Colonoscopy;  Surgeon: Apurva Salinas MD;  [facility]  Service: Endoscopy;  Laterality: N/A;  5 days of full liquids then 24 hours clear liquids    Colonoscopy N/A 4/13/2018    Performed by [facility]inas MD at [facility] (4TH FLR)    ESOPHAGOGASTRODUODENOSCOPY (EGD) N/A 4/13/2018    Performed by [facility] MD at [facility] (4TH FLR)     ESOPHAGOGASTRODUODENOSCOPY (EGD) N/A 3/28/2017    Performed by Pop Schwab [facility] at [facility] ENDO    GASTROSTOMY-JEJEUNOSTOMY TUBE CHANGE/PLACEMENT      LAPAROSCOPY-DIAGNOSTIC N/A 1/3/2017    Performed by [facility]. [facility]ansonne IV, [facility] at [facility] OR    GSPRK-EBOVHHQO-ADTAGLINJBAX N/A 1/3/2017    Performed by Louis [facility]. [facility]ansonne IV, MD at [facility] OR    MANOMETRY-ANORECTAL N/A 4/3/2018    Per[facility] by [facility] at [facility] ENDO (4TH FLR)    RESECTION - SMALL BOWEL N/A 1/3/2017    Performed by Louis [facility]. Jeansonne IV, MD at [facility] OR    SMALL INTESTINE SURGERY      TRANSESOPHAGEAL ECHOCARDIOGRAM (KARMEN) N/A 2018    Performed by Ward Ricks MD at [facility] CATH LAB    UPPER GASTROINTESTINAL ENDOSCOPY         FAMILY HISTORY:  Family History   Problem Relation Age of Onset    Hypertension Mother     Cancer Father 49        stomach CA    Stomach cancer Father 49    Liver cancer Father     Rectal cancer Maternal Grandmother         dx in 70s    Celiac disease Neg Hx     Cirrhosis Neg Hx     Colon cancer Neg Hx     Colon polyps Neg Hx     Crohn's disease Neg Hx     Cystic fibrosis Neg Hx     Esophageal cancer Neg Hx     Hemochromatosis Neg Hx     Inflammatory bowel disease Neg Hx     Irritable bowel syndrome Neg Hx     Liver disease Neg Hx     Ulcerative colitis Neg Hx     Dain's disease Neg Hx     Lymphoma Neg Hx     Tuberculosis Neg Hx     Scleroderma Neg Hx     Rheum arthritis Neg Hx     Multiple sclerosis Neg Hx     Melanoma Neg Hx     Lupus Neg Hx     Psoriasis Neg Hx     Skin cancer Neg Hx        SOCIAL HISTORY:  Social History     Tobacco Use    Smoking status: Former Smoker     Packs/day: 0.25     Last attempt to quit: 12/3/2016     Years since quittin.8    Smokeless tobacco: Never Used   Substance Use Topics    Alcohol use: No     Comment: pt states that she is a recoveirng alcoholic, last drink 11    Drug use: No        REVIEW OF SYSTEMS:  Review of Systems   All other systems  reviewed and are negative.        OBJECTIVE:     Most Recent Vitals:  Temp: 98.4 °F (36.9 °C) (10/08/18 1346)  Pulse: 85 (10/08/18 1346)  BP: 108/63 (10/08/18 1346)      PHYSICAL EXAM:  Physical Exam   Constitutional: She is oriented to person, place, and time and well-developed, well-nourished, and in no distress.   HENT:   Head: Normocephalic and atraumatic.   Right Ear: External ear normal.   Left Ear: External ear normal.   Eyes: Conjunctivae and EOM are normal. Pupils are equal, round, and reactive to light.   Neck: Normal range of motion. Neck supple. No thyromegaly present.   Cardiovascular: Normal rate, regular rhythm and normal heart sounds.   No murmur heard.  Pulmonary/Chest: Breath sounds normal. No respiratory distress.   Abdominal: Soft. Bowel sounds are normal. She exhibits no distension. There is no tenderness. A hernia is present. Hernia confirmed positive in the ventral area.   G tube in place   Old abdominal scars, evidence of prior J tube and midline incisions well healed   Musculoskeletal: Normal range of motion. She exhibits no edema.   Neurological: She is alert and oriented to person, place, and time. GCS score is 15.   Skin: Skin is warm and dry. No erythema.       LABORATORY VALUES:  Lab Results   Component Value Date    WBC 7.25 08/10/2018    HGB 13.3 08/10/2018    HCT 38.7 08/10/2018     08/10/2018    HGBA1C 4.6 01/02/2017     Lab Results   Component Value Date     08/10/2018    K 3.9 08/10/2018     08/10/2018    CO2 26 08/10/2018    BUN 9 08/10/2018    CREATININE 0.8 08/10/2018    GLU 96 08/10/2018    CALCIUM 9.1 08/10/2018    MG 1.7 01/20/2017    PHOS 4.8 (H) 01/20/2017    AST 23 08/10/2018    ALT 24 08/10/2018    ALKPHOS 68 08/10/2018    BILITOT 0.4 08/10/2018    PROT 6.7 08/10/2018    ALBUMIN 4.1 08/10/2018    AMYLASE 78 11/17/2013    LIPASE 13 08/10/2018     Lab Results   Component Value Date    INR 0.9 05/15/2018     Lab Results   Component Value Date    TSH 0.637  05/15/2018    FREET4 0.74 05/15/2018         DIAGNOSTIC STUDIES:  CT Abdomen/Pelvis 8/14/2018  FINDINGS:  ABDOMEN:    - Lung bases: Resolving opacity in the lateral aspect of the left lower lobe.  No further follow-up is necessary.    - Liver: No focal mass.    - Gallbladder: The gallbladder is surgically absent.    - Bile Ducts: No evidence of intra or extra hepatic biliary ductal dilation.    - Stomach: Gastrostomy tube in place.    - Spleen: Negative.    - Kidneys: No mass or hydronephrosis.    - Adrenals: Unremarkable.    - Pancreas: No mass or peripancreatic fat stranding.    - Retroperitoneum:  No significant adenopathy.    - Vascular: No abdominal aortic aneurysm.    - Abdominal wall: Two ventral hernias containing segments of nonobstructed bowel.    PELVIS:    No pelvic mass, adenopathy, or free fluid. No CT evidence of abscesses in the abdomen or pelvis as clinically questioned.    BOWEL/MESENTERY:    No evidence of bowel obstruction or inflammation.    Surgical staple line in the mid abdomen, consistent with postoperative changes from prior small bowel resection.    BONES:    Postoperative changes from prior posterior spinal fusion of L4-L5 with fusion extending to the sacrum and SI joints.  No acute osseous abnormality and no suspicious lytic or blastic lesion.      Impression       1.  No CT evidence of abscesses in the abdomen or pelvis as clinically questioned.    2.  Resolving opacity in the lateral aspect of the left lower lobe, likely scar or atelectasis.  No further follow-up is necessary.    3.  Surgical staple line in the mid abdomen, consistent with postoperative changes from prior small bowel resection.    4.  Two ventral hernias containing segments of nonobstructed bowel.    5.  Postoperative changes from prior posterior spinal fusion of L4-L5 with fusion extending to the sacrum and SI joints.    6.  Additional findings as above.       ASSESSMENT:     Lia Avila is a 38 y.o. female  referred for chronic abdominal pain and CT with evidence of ventral hernias    PLAN:  - Given co-morbid functional GI disorder, will refer to Dr. Linda to discuss gastric stimulator placement, hernia repair and J tube placement    Nichole Escobedo MD  PGY-2 General Surgery   [phone]

## 2018-10-09 ENCOUNTER — PATIENT MESSAGE (OUTPATIENT)
Dept: GASTROENTEROLOGY | Facility: CLINIC | Age: 38
End: 2018-10-09

## 2018-10-09 ENCOUNTER — TELEPHONE (OUTPATIENT)
Dept: GASTROENTEROLOGY | Facility: CLINIC | Age: 38
End: 2018-10-09

## 2018-10-09 NOTE — TELEPHONE ENCOUNTER
----- Message from Dory Pulliam sent at 10/9/2018  1:46 PM CDT -----  Contact: self [phone]  Patient Returning Call from [facility]    Who Left Message for Patient: Pt states Dr. Salinas's nurse  Communication Preference: self [phone]  Additional Information: Pt states it is in reference to a study. I also didn't see a call charted

## 2018-10-10 ENCOUNTER — TELEPHONE (OUTPATIENT)
Dept: GASTROENTEROLOGY | Facility: CLINIC | Age: 38
End: 2018-10-10

## 2018-10-10 ENCOUNTER — PATIENT MESSAGE (OUTPATIENT)
Dept: GASTROENTEROLOGY | Facility: CLINIC | Age: 38
End: 2018-10-10

## 2018-10-11 RX ORDER — ONDANSETRON 4 MG/1
8 TABLET, ORALLY DISINTEGRATING ORAL EVERY 8 HOURS PRN
Qty: 60 TABLET | Refills: 6 | Status: SHIPPED | OUTPATIENT
Start: 2018-10-11 | End: 2019-09-11

## 2018-10-18 ENCOUNTER — HOSPITAL ENCOUNTER (OUTPATIENT)
Dept: RADIOLOGY | Facility: HOSPITAL | Age: 38
Discharge: HOME | End: 2018-10-18
Attending: NURSE PRACTITIONER
Payer: COMMERCIAL

## 2018-10-18 DIAGNOSIS — R11.2 NAUSEA AND VOMITING, INTRACTABILITY OF VOMITING NOT SPECIFIED, UNSPECIFIED VOMITING TYPE: ICD-10-CM

## 2018-10-18 DIAGNOSIS — R10.9 ABDOMINAL PAIN, UNSPECIFIED ABDOMINAL LOCATION: ICD-10-CM

## 2018-10-18 DIAGNOSIS — R68.81 EARLY SATIETY: ICD-10-CM

## 2018-10-18 PROCEDURE — 78264 GASTRIC EMPTYING IMG STUDY: CPT | Mod: 26,,, | Performed by: RADIOLOGY

## 2018-10-18 PROCEDURE — 78264 GASTRIC EMPTYING IMG STUDY: CPT | Mod: TC

## 2018-10-18 PROCEDURE — A9541 TC99M SULFUR COLLOID: HCPCS

## 2018-10-20 ENCOUNTER — TELEPHONE (OUTPATIENT)
Dept: GASTROENTEROLOGY | Facility: CLINIC | Age: 38
End: 2018-10-20

## 2018-10-20 NOTE — [employer] PATIENT INSTRUCTIONS
Discharge Summary/Instructions after an Endoscopic Procedure  Patient Name: Lia Avila  Patient MRN: [id number]  Patient YOB: 1980 Monday, October 08, 2018  Apurva Salinas MD  RESTRICTIONS:  During your procedure today, you received medications for sedation.  These   medications may affect your judgment, balance and coordination.  Therefore,   for 24 hours, you have the following restrictions:   - DO NOT drive a car, operate machinery, make legal/financial decisions,   sign important papers or drink alcohol.    ACTIVITY:  Today: no heavy lifting, straining or running due to procedural   sedation/anesthesia.  The following day: return to full activity including work.  DIET:  Eat and drink normally unless instructed otherwise.     TREATMENT FOR COMMON SIDE EFFECTS:  - Mild abdominal pain, nausea, belching, bloating or excessive gas:  rest,   eat lightly and use a heating pad.  - Sore Throat: treat with throat lozenges and/or gargle with warm salt   water.  - Because air was used during the procedure, expelling large amounts of air   from your rectum or belching is normal.  - If a bowel prep was taken, you may not have a bowel movement for 1-3 days.    This is normal.  SYMPTOMS TO WATCH FOR AND REPORT TO YOUR PHYSICIAN:  1. Abdominal pain or bloating, other than gas cramps.  2. Chest pain.  3. Back pain.  4. Signs of infection such as: chills or fever occurring within 24 hours   after the procedure.  5. Rectal bleeding, which would show as bright red, maroon, or black stools.   (A tablespoon of blood from the rectum is not serious, especially if   hemorrhoids are present.)  6. Vomiting.  7. Weakness or dizziness.  GO DIRECTLY TO THE NEAREST EMERGENCY ROOM IF YOU HAVE ANY OF THE FOLLOWING:      Difficulty breathing              Chills and/or fever over 101 F   Persistent vomiting and/or vomiting blood   Severe abdominal pain   Severe chest pain   Black, tarry stools   Bleeding- more than one  tablespoon   Any other symptom or condition that you feel may need urgent attention  Your doctor recommends these additional instructions:  If any biopsies were taken, your doctors clinic will contact you in 1 to 2   weeks with any results.  - Return to my office as previously scheduled.   - Discharge patient to home (ambulatory).  For questions, problems or results please call your physician - Apruva Salinas MD at Work:  [phone].  [facility] [place], EMERGENCY ROOM PHONE NUMBER: [phone]  IF A COMPLICATION OR EMERGENCY SITUATION ARISES AND YOU ARE UNABLE TO REACH   YOUR PHYSICIAN - GO DIRECTLY TO THE EMERGENCY ROOM.  Apurva Salinas MD  10/20/2018 2:09:13 PM  This report has been verified and signed electronically.  [employer]

## 2018-10-20 NOTE — TELEPHONE ENCOUNTER
Manometry Results:    Normal esophageal manometry.     Please let patient know that the manometry shows    Normal esophageal motility     Follow up as previously indicated

## 2018-10-22 ENCOUNTER — PATIENT MESSAGE (OUTPATIENT)
Dept: GASTROENTEROLOGY | Facility: CLINIC | Age: 38
End: 2018-10-22

## 2018-10-24 ENCOUNTER — PATIENT MESSAGE (OUTPATIENT)
Dept: GASTROENTEROLOGY | Facility: CLINIC | Age: 38
End: 2018-10-24

## 2018-10-30 ENCOUNTER — PATIENT MESSAGE (OUTPATIENT)
Dept: GASTROENTEROLOGY | Facility: CLINIC | Age: 38
End: 2018-10-30

## 2018-10-30 NOTE — TELEPHONE ENCOUNTER
Lakia Segura try to bring her in ASAP to evaluate the PEG site.  Consider repeat CT, CMP, CBC depending on how she looks.  Granulation tissue around PEG site can be very painful.  She should go to ED or Urgent care if sx are severe and you cannot evaluate her soon.      Thanks  ML

## 2018-10-31 ENCOUNTER — TELEPHONE (OUTPATIENT)
Dept: GASTROENTEROLOGY | Facility: CLINIC | Age: 38
End: 2018-10-31

## 2018-10-31 NOTE — TELEPHONE ENCOUNTER
Pt fell off  schedule tomorrow at 8. Attempted to give  that appt. Pt declined due to work. States she has already missed 4 days being out sick. Pt advised to go to ED.    Pt wants to know what she can do until her appt on 11/16.

## 2018-11-01 ENCOUNTER — LAB VISIT (OUTPATIENT)
Dept: LAB | Facility: HOSPITAL | Age: 38
End: 2018-11-01
Attending: INTERNAL MEDICINE
Payer: COMMERCIAL

## 2018-11-01 ENCOUNTER — OFFICE VISIT (OUTPATIENT)
Dept: GASTROENTEROLOGY | Facility: CLINIC | Age: 38
End: 2018-11-01
Payer: COMMERCIAL

## 2018-11-01 VITALS
BODY MASS INDEX: 32.05 KG/M2 | WEIGHT: 174.19 LBS | SYSTOLIC BLOOD PRESSURE: 107 MMHG | HEIGHT: 62 IN | DIASTOLIC BLOOD PRESSURE: 71 MMHG | HEART RATE: 93 BPM

## 2018-11-01 DIAGNOSIS — D50.9 IRON DEFICIENCY ANEMIA, UNSPECIFIED IRON DEFICIENCY ANEMIA TYPE: ICD-10-CM

## 2018-11-01 DIAGNOSIS — R14.0 BLOATING: ICD-10-CM

## 2018-11-01 DIAGNOSIS — R09.A2 GLOBUS HYSTERICUS: ICD-10-CM

## 2018-11-01 DIAGNOSIS — R19.7 DIARRHEA, UNSPECIFIED TYPE: ICD-10-CM

## 2018-11-01 DIAGNOSIS — R10.84 GENERALIZED ABDOMINAL PAIN: ICD-10-CM

## 2018-11-01 DIAGNOSIS — R07.89 NON-CARDIAC CHEST PAIN: ICD-10-CM

## 2018-11-01 DIAGNOSIS — R68.81 EARLY SATIETY: ICD-10-CM

## 2018-11-01 DIAGNOSIS — R11.2 NAUSEA AND VOMITING, INTRACTABILITY OF VOMITING NOT SPECIFIED, UNSPECIFIED VOMITING TYPE: ICD-10-CM

## 2018-11-01 DIAGNOSIS — K21.9 GASTROESOPHAGEAL REFLUX DISEASE, ESOPHAGITIS PRESENCE NOT SPECIFIED: ICD-10-CM

## 2018-11-01 DIAGNOSIS — R10.9 ABDOMINAL PAIN, UNSPECIFIED ABDOMINAL LOCATION: Primary | ICD-10-CM

## 2018-11-01 DIAGNOSIS — K59.00 CONSTIPATION, UNSPECIFIED CONSTIPATION TYPE: ICD-10-CM

## 2018-11-01 DIAGNOSIS — R13.10 DYSPHAGIA, UNSPECIFIED TYPE: ICD-10-CM

## 2018-11-01 DIAGNOSIS — R10.9 ABDOMINAL PAIN, UNSPECIFIED ABDOMINAL LOCATION: ICD-10-CM

## 2018-11-01 LAB
ALBUMIN SERPL BCP-MCNC: 3.8 G/DL
ALP SERPL-CCNC: 77 U/L
ALT SERPL W/O P-5'-P-CCNC: 20 U/L
ANION GAP SERPL CALC-SCNC: 9 MMOL/L
AST SERPL-CCNC: 26 U/L
BASOPHILS # BLD AUTO: 0.05 K/UL
BASOPHILS NFR BLD: 0.6 %
BILIRUB SERPL-MCNC: 0.3 MG/DL
BUN SERPL-MCNC: 14 MG/DL
CALCIUM SERPL-MCNC: 9.8 MG/DL
CHLORIDE SERPL-SCNC: 107 MMOL/L
CO2 SERPL-SCNC: 25 MMOL/L
CREAT SERPL-MCNC: 0.8 MG/DL
DIFFERENTIAL METHOD: ABNORMAL
EOSINOPHIL # BLD AUTO: 0.1 K/UL
EOSINOPHIL NFR BLD: 1.4 %
ERYTHROCYTE [DISTWIDTH] IN BLOOD BY AUTOMATED COUNT: 13.3 %
EST. GFR  (AFRICAN AMERICAN): >60 ML/MIN/1.73 M^2
EST. GFR  (NON AFRICAN AMERICAN): >60 ML/MIN/1.73 M^2
FERRITIN SERPL-MCNC: 14 NG/ML
GLUCOSE SERPL-MCNC: 76 MG/DL
HCT VFR BLD AUTO: 45.1 %
HGB BLD-MCNC: 14.7 G/DL
IMM GRANULOCYTES # BLD AUTO: 0.05 K/UL
IMM GRANULOCYTES NFR BLD AUTO: 0.6 %
IRON SERPL-MCNC: 50 UG/DL
LYMPHOCYTES # BLD AUTO: 1.9 K/UL
LYMPHOCYTES NFR BLD: 21.6 %
MCH RBC QN AUTO: 29.8 PG
MCHC RBC AUTO-ENTMCNC: 32.6 G/DL
MCV RBC AUTO: 92 FL
MONOCYTES # BLD AUTO: 0.6 K/UL
MONOCYTES NFR BLD: 7.1 %
NEUTROPHILS # BLD AUTO: 6 K/UL
NEUTROPHILS NFR BLD: 68.7 %
NRBC BLD-RTO: 0 /100 WBC
PLATELET # BLD AUTO: 215 K/UL
PMV BLD AUTO: 10.8 FL
POTASSIUM SERPL-SCNC: 4.4 MMOL/L
PROT SERPL-MCNC: 7.6 G/DL
RBC # BLD AUTO: 4.93 M/UL
SATURATED IRON: 9 %
SODIUM SERPL-SCNC: 141 MMOL/L
TOTAL IRON BINDING CAPACITY: 567 UG/DL
TRANSFERRIN SERPL-MCNC: 383 MG/DL
WBC # BLD AUTO: 8.69 K/UL

## 2018-11-01 PROCEDURE — 82728 ASSAY OF FERRITIN: CPT

## 2018-11-01 PROCEDURE — 85025 COMPLETE CBC W/AUTO DIFF WBC: CPT

## 2018-11-01 PROCEDURE — 3008F BODY MASS INDEX DOCD: CPT | Mod: CPTII,S$GLB,, | Performed by: INTERNAL MEDICINE

## 2018-11-01 PROCEDURE — 99999 PR PBB SHADOW E&M-EST. PATIENT-LVL V: CPT | Mod: PBBFAC,,, | Performed by: INTERNAL MEDICINE

## 2018-11-01 PROCEDURE — 99215 OFFICE O/P EST HI 40 MIN: CPT | Mod: S$GLB,,, | Performed by: INTERNAL MEDICINE

## 2018-11-01 PROCEDURE — 83540 ASSAY OF IRON: CPT

## 2018-11-01 PROCEDURE — 80053 COMPREHEN METABOLIC PANEL: CPT

## 2018-11-01 PROCEDURE — 36415 COLL VENOUS BLD VENIPUNCTURE: CPT

## 2018-11-01 RX ORDER — ERGOCALCIFEROL (VITAMIN D2) 10 MCG
TABLET ORAL
COMMUNITY
End: 2019-01-21

## 2018-11-01 NOTE — PATIENT INSTRUCTIONS
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 Adalid Ferreira  -We are referring you to Dr. Rabago

## 2018-11-01 NOTE — PROGRESS NOTES
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 pain    7. Gastroesophageal reflux disease, esophagitis presence not specified    8. Nausea and vomiting, intractability of vomiting not specified, unspecified vomiting type    9. Early satiety    10. Bloating    11. Diarrhea, unspecified type    12. Constipation, unspecified constipation type          Order summary:  Orders Placed This Encounter    CT Abdomen Pelvis With Contrast    Comprehensive metabolic panel    CBC auto differential    Ferritin    Iron and TIBC    Ambulatory Referral to Gastroenterology    plecanatide (TRULANCE) 3 mg Tab       Thank you so much for allowing me to participate in the care of Lia Foster Margarita Salinas MD

## 2018-11-13 ENCOUNTER — TELEPHONE (OUTPATIENT)
Dept: PHARMACY | Facility: CLINIC | Age: 38
End: 2018-11-13

## 2018-11-16 ENCOUNTER — PATIENT MESSAGE (OUTPATIENT)
Dept: GASTROENTEROLOGY | Facility: CLINIC | Age: 38
End: 2018-11-16

## 2018-11-16 NOTE — TELEPHONE ENCOUNTER
Called pt to inform her that after several attempts, we were having trouble as well. I called , which the first time, the phone was hung up. Second time, I was told that ' nurse Dory was working on this ref and would contact pt directly. When asked for the status, there was no information given or assistance. Tatiana, then tried several times and was directed to Ruth bustos with Dr. Rabago at [facility]. Tatiana left a message in hopes that we could get a direct contact to Dory.

## 2018-11-20 ENCOUNTER — OFFICE VISIT (OUTPATIENT)
Dept: SURGERY | Facility: CLINIC | Age: 38
End: 2018-11-20
Payer: COMMERCIAL

## 2018-11-20 VITALS
SYSTOLIC BLOOD PRESSURE: 149 MMHG | WEIGHT: 171.75 LBS | BODY MASS INDEX: 31.6 KG/M2 | TEMPERATURE: 98 F | HEART RATE: 71 BPM | HEIGHT: 62 IN | DIASTOLIC BLOOD PRESSURE: 79 MMHG

## 2018-11-20 DIAGNOSIS — K43.9 VENTRAL HERNIA WITHOUT OBSTRUCTION OR GANGRENE: Primary | ICD-10-CM

## 2018-11-20 PROCEDURE — 99999 PR PBB SHADOW E&M-EST. PATIENT-LVL III: CPT | Mod: PBBFAC,,, | Performed by: SURGERY

## 2018-11-20 PROCEDURE — 99213 OFFICE O/P EST LOW 20 MIN: CPT | Mod: S$GLB,,, | Performed by: SURGERY

## 2018-11-20 PROCEDURE — 3008F BODY MASS INDEX DOCD: CPT | Mod: CPTII,S$GLB,, | Performed by: SURGERY

## 2018-11-20 RX ORDER — LINACLOTIDE 290 UG/1
CAPSULE, GELATIN COATED ORAL
COMMUNITY
Start: 2018-11-16 | End: 2021-06-04

## 2018-11-20 RX ORDER — TRAZODONE HYDROCHLORIDE 150 MG/1
150 TABLET ORAL
COMMUNITY
Start: 2018-11-19

## 2018-11-20 RX ORDER — QUETIAPINE FUMARATE 50 MG/1
100 TABLET, EXTENDED RELEASE ORAL
COMMUNITY
Start: 2018-11-19 | End: 2019-01-21

## 2018-11-20 NOTE — PROGRESS NOTES
History & Physical    SUBJECTIVE:     History of Present Illness:  Patient is a 38 y.o. female presents with incisional hernia.  Symptoms started 7 years ago with drastic weight loss, belching, nausea and vomiting.  She had a g and j tube placed at the time.  She had back surgery through a midline incision and had several bowel obstructions, one requiring small bowel resection.  She says this was due to medications she was given.  All her ges studies have been normal but egd has shown possible gastroparesis.  She has shown me a picture of her bloated from her phone.  She has seen Dr. Traylor who documented wide mouthed hernias.      Chief Complaint   Patient presents with    Consult       Review of patient's allergies indicates:   Allergen Reactions    Sulfa (sulfonamide antibiotics)        Current Outpatient Medications   Medication Sig Dispense Refill    quetiapine 50 mg oral tb24 (SEROQUEL XR) 50 mg Tb24 Take 100 mg by mouth.      traZODone (DESYREL) 150 MG tablet Take 150 mg by mouth.      atorvastatin (LIPITOR) 20 MG tablet 20 mg once daily.   1    dextroamphetamine-amphetamine (ADDERALL) 20 mg tablet Take 1.5 tabs every morning, 1 tab 4-5 hours after that and another half tab later each day.      diazePAM (VALIUM) 10 MG Tab Take 10 mg by mouth as needed.      duloxetine (CYMBALTA) 60 MG capsule Take 120 mg by mouth once daily.       ERGOCALCIFEROL, VITAMIN D2, (VITAMIN D ORAL) Take 1,000 mg by mouth once daily.      ergocalciferol, vitamin D2, 400 unit Tab Take by mouth.      esomeprazole (NEXIUM) 40 MG capsule Take 1 capsule (40 mg total) by mouth 2 (two) times daily before meals. 60 capsule 6    ferrous gluconate (FERGON) 324 MG tablet Take 324 mg by mouth.      hydrocodone-acetaminophen 10-325mg (NORCO)  mg Tab Take by mouth every 6 (six) hours as needed for Pain.      LINZESS 290 mcg Cap       metformin (GLUCOPHAGE) 500 MG tablet Take 500 mg by mouth 2 (two) times daily with meals.        mirtazapine (REMERON) 15 MG tablet Take 15 mg by mouth.      ondansetron (ZOFRAN-ODT) 4 MG TbDL Take 2 tablets (8 mg total) by mouth every 8 (eight) hours as needed. 60 tablet 6    plecanatide (TRULANCE) 3 mg Tab Take 1 tablet (3 mg) by mouth once daily. 30 tablet 6    prochlorperazine (COMPAZINE) 10 MG tablet Take 10 mg by mouth 2 (two) times daily.      quetiapine (SEROQUEL XR) 300 MG Tb24 Take 100 mg by mouth once daily.       quetiapine 50 mg oral tb24 (SEROQUEL XR) 50 mg Tb24       scopolamine (TRANSDERM-SCOP) 1.3-1.5 mg (1 mg over 3 days) Place 1 patch onto the skin every 72 hours. 10 patch 3    trazodone (DESYREL) 150 MG tablet Take 50 mg by mouth every evening.        No current facility-administered medications for this visit.        Past Medical History:   Diagnosis Date    Acid reflux     Aerophagia     Alcoholic     recovering    Anxiety     Depression     Functional gastrointestinal disorder     IBS (irritable bowel syndrome)     Irritable bowel syndrome     Sleep apnea     Trivial aortic valve anomaly      Past Surgical History:   Procedure Laterality Date    BACK SURGERY      fusion of L4, L5, and S1    CHEST TUBE PLACEMENT Left 1/16/2017    Performed by Louis O. Jeansonne IV, MD at [facility] OR    CHOLECYSTECTOMY      COLON SURGERY      COLONOSCOPY      COLONOSCOPY N/A 4/13/2018    Procedure: Colonoscopy;  Surgeon: Apurva Salinas MD;  Location: [address]);  Service: Endoscopy;  Laterality: N/A;  5 days of full liquids then 24 hours clear liquids    Colonoscopy N/A 4/13/2018    Performed by Apurva Salinas MD at [address])    ESOPHAGEAL MANOMETRY WITH MEASUREMENT OF IMPEDANCE N/A 10/8/2018    Procedure: MANOMETRY, ESOPHAGUS, WITH IMPEDANCE MEASUREMENT;  Surgeon: Apurva Salinas MD;  Location: [address]);  Service: Endoscopy;  Laterality: N/A;    ESOPHAGOGASTRODUODENOSCOPY (EGD) N/A 4/13/2018    Performed by Apurva Salinas MD at [address]  [facility])    ESOPHAGOGASTRODUODENOSCOPY (EGD) N/A 3/28/2017    Performed by Pop Schwab [facility] at [facility] ENDO    GASTROSTOMY-JEJEUNOSTOMY TUBE CHANGE/PLACEMENT      LAPAROSCOPY-DIAGNOSTIC N/A 1/3/2017    [facility] OR    USARR-IWNPTFOO-[id number] N/A 1/3/2017    [facility] OR    MANOMETRY, ESOPHAGUS, WITH IMPEDANCE MEASUREMENT N/A 10/8/2018    Performed by [facility]inas MD at [facility] ENDO (4TH FLR)    MANOMETRY-ANORECTAL N/A 4/3/2018    Performed by [facility] at [facility] ENDO (4TH FLR)    RESECTION - SMALL BOWEL N/A 1/3/2017    [facility], [facility] at [facility] OR    SMALL INTESTINE SURGERY      TRANSESOPHAGEAL ECHOCARDIOGRAM (KARMEN) N/A 2018    Performed by Ward Ricks MD at [facility] CATH LAB    UPPER GASTROINTESTINAL ENDOSCOPY       Family History   Problem Relation Age of Onset    Hypertension Mother     Cancer Father 49        stomach CA    Stomach cancer Father 49    Liver cancer Father     Rectal cancer Maternal Grandmother         dx in 70s    Celiac disease Neg Hx     Cirrhosis Neg Hx     Colon cancer Neg Hx     Colon polyps Neg Hx     Crohn's disease Neg Hx     Cystic fibrosis Neg Hx     Esophageal cancer Neg Hx     Hemochromatosis Neg Hx     Inflammatory bowel disease Neg Hx     Irritable bowel syndrome Neg Hx     Liver disease Neg Hx     Ulcerative colitis Neg Hx     Dain's disease Neg Hx     Lymphoma Neg Hx     Tuberculosis Neg Hx     Scleroderma Neg Hx     Rheum arthritis Neg Hx     Multiple sclerosis Neg Hx     Melanoma Neg Hx     Lupus Neg Hx     Psoriasis Neg Hx     Skin cancer Neg Hx      Social History     Tobacco Use    Smoking status: Former Smoker     Packs/day: 0.25     Last attempt to quit: 12/3/2016     Years since quittin.9    Smokeless tobacco: Never Used   Substance Use Topics    Alcohol use: No     Comment: pt states that she is a recoveirng alcoholic, last  "drink 7-2-11    Drug use: No        Review of Systems:  Review of Systems   Constitutional: Negative for unexpected weight change.        She is gaining weight       OBJECTIVE:     Vital Signs (Most Recent)  Temp: 98.1 °F (36.7 °C) (11/20/18 1000)  Pulse: 71 (11/20/18 1000)  BP: (!) 149/79 (11/20/18 1000)  5' 2" (1.575 m)  77.9 kg (171 lb 11.8 oz)     Physical Exam:  Physical Exam   Constitutional: She is oriented to person, place, and time. She appears well-developed and well-nourished.   Abdominal:       Neurological: She is alert and oriented to person, place, and time.   Skin: Skin is warm and dry.   Psychiatric: She has a normal mood and affect. Her behavior is normal. Judgment and thought content normal.   Vitals reviewed.      Laboratory  CBC: Reviewed  CMP: Reviewed    Diagnostic Results:  CT: Reviewed    ASSESSMENT/PLAN:     Incisional hernia, likely asymptomatic.  Her symptoms are not due to gastroparesis.    PLAN:       I don't think this needs to be repaired and may make her current symptoms worse.         "

## 2018-11-20 NOTE — LETTER
[facility] - General Surgery  [address]  [facility] [address]  Phone: [phone] November 20, 2018      Akanksha West MD  [address]  [address]  [facility] [address]    Patient: Lia Avila   MR Number: [id number]   YOB: 1980   Date of Visit: 11/20/2018     Dear Dr. West:    Thank you for referring Lia Avila to me for evaluation. Attached you will find relevant portions of my assessment and plan of care.    Ms. Avila has an incisional hernia, likely asymptomatic.  Her symptoms are not due to gastroparesis.    I do not think this needs to be repaired and may make her current symptoms worse.    If you have questions, please do not hesitate to call me. I look forward to following Lia Avila along with you.    Sincerely,    Julio Linda MD  Section Head - General, Laparoscopic, Bariatric  Acute Care and Oncologic Surgery   - Surgical Weight Loss Program  [facility]    WSR/hcr    CC  MD Ever Hernandez MD

## 2018-11-30 ENCOUNTER — HOSPITAL ENCOUNTER (EMERGENCY)
Facility: HOSPITAL | Age: 38
Discharge: HOME | End: 2018-11-30
Attending: EMERGENCY MEDICINE
Payer: COMMERCIAL

## 2018-11-30 VITALS
DIASTOLIC BLOOD PRESSURE: 53 MMHG | HEIGHT: 62 IN | WEIGHT: 167.56 LBS | RESPIRATION RATE: 18 BRPM | SYSTOLIC BLOOD PRESSURE: 97 MMHG | HEART RATE: 77 BPM | TEMPERATURE: 98 F | OXYGEN SATURATION: 98 % | BODY MASS INDEX: 30.83 KG/M2

## 2018-11-30 DIAGNOSIS — E86.1 INTRAVASCULAR VOLUME DEPLETION: ICD-10-CM

## 2018-11-30 DIAGNOSIS — R11.0 CHRONIC NAUSEA: Primary | ICD-10-CM

## 2018-11-30 DIAGNOSIS — R11.2 NAUSEA & VOMITING: ICD-10-CM

## 2018-11-30 LAB
ALBUMIN SERPL BCP-MCNC: 3.9 G/DL
ALP SERPL-CCNC: 62 U/L
ALT SERPL W/O P-5'-P-CCNC: 8 U/L
ANION GAP SERPL CALC-SCNC: 10 MMOL/L
AST SERPL-CCNC: 15 U/L
B-HCG UR QL: NEGATIVE
BASOPHILS # BLD AUTO: 0.02 K/UL
BASOPHILS NFR BLD: 0.3 %
BILIRUB SERPL-MCNC: 0.4 MG/DL
BILIRUB UR QL STRIP: ABNORMAL
BUN SERPL-MCNC: 12 MG/DL
CALCIUM SERPL-MCNC: 9.2 MG/DL
CHLORIDE SERPL-SCNC: 105 MMOL/L
CLARITY UR: CLEAR
CO2 SERPL-SCNC: 23 MMOL/L
COLOR UR: YELLOW
CREAT SERPL-MCNC: 0.8 MG/DL
DIFFERENTIAL METHOD: NORMAL
EOSINOPHIL # BLD AUTO: 0 K/UL
EOSINOPHIL NFR BLD: 0.5 %
ERYTHROCYTE [DISTWIDTH] IN BLOOD BY AUTOMATED COUNT: 13 %
EST. GFR  (AFRICAN AMERICAN): >60 ML/MIN/1.73 M^2
EST. GFR  (NON AFRICAN AMERICAN): >60 ML/MIN/1.73 M^2
GLUCOSE SERPL-MCNC: 93 MG/DL
GLUCOSE UR QL STRIP: NEGATIVE
HCT VFR BLD AUTO: 38.3 %
HGB BLD-MCNC: 13.1 G/DL
HGB UR QL STRIP: NEGATIVE
KETONES UR QL STRIP: ABNORMAL
LEUKOCYTE ESTERASE UR QL STRIP: NEGATIVE
LIPASE SERPL-CCNC: 22 U/L
LYMPHOCYTES # BLD AUTO: 1.9 K/UL
LYMPHOCYTES NFR BLD: 24.2 %
MCH RBC QN AUTO: 29.4 PG
MCHC RBC AUTO-ENTMCNC: 34.2 G/DL
MCV RBC AUTO: 86 FL
MONOCYTES # BLD AUTO: 0.6 K/UL
MONOCYTES NFR BLD: 7.5 %
NEUTROPHILS # BLD AUTO: 5.3 K/UL
NEUTROPHILS NFR BLD: 67.5 %
NITRITE UR QL STRIP: NEGATIVE
PH UR STRIP: 6 [PH] (ref 5–8)
PLATELET # BLD AUTO: 195 K/UL
PMV BLD AUTO: 10.9 FL
POTASSIUM SERPL-SCNC: 3.9 MMOL/L
PROT SERPL-MCNC: 6.7 G/DL
PROT UR QL STRIP: ABNORMAL
RBC # BLD AUTO: 4.45 M/UL
SODIUM SERPL-SCNC: 138 MMOL/L
SP GR UR STRIP: >=1.03 (ref 1–1.03)
URN SPEC COLLECT METH UR: ABNORMAL
UROBILINOGEN UR STRIP-ACNC: NEGATIVE EU/DL
WBC # BLD AUTO: 7.88 K/UL

## 2018-11-30 PROCEDURE — 81025 URINE PREGNANCY TEST: CPT

## 2018-11-30 PROCEDURE — 80053 COMPREHEN METABOLIC PANEL: CPT

## 2018-11-30 PROCEDURE — 63600175 PHARM REV CODE 636 W HCPCS: Performed by: EMERGENCY MEDICINE

## 2018-11-30 PROCEDURE — 96361 HYDRATE IV INFUSION ADD-ON: CPT

## 2018-11-30 PROCEDURE — 99284 EMERGENCY DEPT VISIT MOD MDM: CPT | Mod: 25

## 2018-11-30 PROCEDURE — 85025 COMPLETE CBC W/AUTO DIFF WBC: CPT

## 2018-11-30 PROCEDURE — 25000003 PHARM REV CODE 250: Performed by: EMERGENCY MEDICINE

## 2018-11-30 PROCEDURE — 96374 THER/PROPH/DIAG INJ IV PUSH: CPT

## 2018-11-30 PROCEDURE — 81003 URINALYSIS AUTO W/O SCOPE: CPT

## 2018-11-30 PROCEDURE — 83690 ASSAY OF LIPASE: CPT

## 2018-11-30 RX ORDER — ONDANSETRON 2 MG/ML
8 INJECTION INTRAMUSCULAR; INTRAVENOUS
Status: COMPLETED | OUTPATIENT
Start: 2018-11-30 | End: 2018-11-30

## 2018-11-30 RX ADMIN — ONDANSETRON 8 MG: 2 INJECTION, SOLUTION INTRAMUSCULAR; INTRAVENOUS at 06:11

## 2018-11-30 RX ADMIN — SODIUM CHLORIDE 2000 ML: 0.9 INJECTION, SOLUTION INTRAVENOUS at 06:11

## 2018-11-30 NOTE — ED PROVIDER NOTES
"SCRIBE #1 NOTE: I, Allyson Styles, am scribing for, and in the presence of, Nancy Spence MD. I have scribed the entire note.      History      Chief Complaint   Patient presents with    Urinary Retention     pt. reports posible "Dehydration with decrease urination, mental status change, N/V, with hx of Multiple GI surgeries."       Review of patient's allergies indicates:   Allergen Reactions    Sulfa (sulfonamide antibiotics)         HPI   HPI    11/30/2018, 5:33 PM   History obtained from the patient      History of Present Illness: Lia Avila is a 38 y.o. female patient with a PMH of aerophagia, IBS, Acid Reflux, SBO, PSHx of cholecystectomy, Colon Resection, Diagnostic laparoscopy, Gastrostomy who presents to the Emergency Department for decreased urine output which onset gradually 1 week ago. Pt reports she has only urinated 4 times in the past 5 days. Pt is concerned she is dehydrated. Symptoms are constant and moderate in severity. No mitigating or exacerbating factors reported. Associated sxs include chronic n/v, chronic generalized abd pain, decreased appetite, confusion. Pt reports her abd pain does not feel like past bowel obstructions. She reports she has been venting her G tube daily which is normal but has been doing tube feedings the past 2-3 days secondary to her decreased appetite. 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 Patient denies any fever, chills, diarrhea, dysuria, hematuria, myalgias, CP, SOB, and all other sxs at this time. She normally takes 8mg of Zofran but has not taken any today. No further complaints or concerns at this time.       Arrival mode: Personal vehicle     PCP: Akanksha West MD       Past Medical History:  Past Medical History:   Diagnosis Date    Acid reflux     Aerophagia     Alcoholic     recovering    Anxiety     Depression     Functional gastrointestinal disorder     IBS (irritable " bowel syndrome)     Irritable bowel syndrome     Sleep apnea     Trivial aortic valve anomaly        Past Surgical History:  Past Surgical History:   Procedure Laterality Date    BACK SURGERY      fusion of L4, L5, and S1    CHEST TUBE PLACEMENT Left 1/16/2017    Performed by Louis O. Jeansonne IV, MD at [facility] OR    CHOLECYSTECTOMY      COLON SURGERY      COLONOSCOPY      COLONOSCOPY N/A 4/13/2018    Procedure: Colonoscopy;  Surgeon: Apurva Salinas MD;  Location: [facility] (4TH FLR);  Service: Endoscopy;  Laterality: N/A;  5 days of full liquids then 24 hours clear liquids    Colonoscopy N/A 4/13/2018    Performed by Apurva Salinas MD at [facility] (4TH FLR)    ESOPHAGEAL MANOMETRY WITH MEASUREMENT OF IMPEDANCE N/A 10/8/2018    Procedure: MANOMETRY, ESOPHAGUS, WITH IMPEDANCE MEASUREMENT;  Surgeon: Apurva Salinas MD;  Location: [facility] (4TH FLR);  Service: Endoscopy;  Laterality: N/A;    ESOPHAGOGASTRODUODENOSCOPY (EGD) N/A 4/13/2018    Performed by Apurva Salinas MD at [facility] (4TH FLR)    ESOPHAGOGASTRODUODENOSCOPY (EGD) N/A 3/28/2017    Performed by Pop Schwab MD at [facility] ENDO    GASTROSTOMY-JEJEUNOSTOMY TUBE CHANGE/PLACEMENT      LAPAROSCOPY-DIAGNOSTIC N/A 1/3/2017    Performed by Louis O. Jeansonne IV, MD at [facility] OR    NNACC-[id number]-[id number] N/A 1/3/2017    Performed by [facility]. [facility]ansonne IV, MD at [facility] OR    MANOMETRY, ESOPHAGUS, WITH IMPEDANCE MEASUREMENT N/A 10/8/2018    Performed by Apurva Salinas MD at [facility] (4TH FLR)    MANOMETRY-ANORECTAL N/A 4/3/2018    Performed by Apurva Salinas MD at [facility] (4TH FLR)    RESECTION - SMALL BOWEL N/A 1/3/2017    Performed by [facility]. [facility]ne IV, MD at [facility] OR    SMALL INTESTINE SURGERY      TRANSESOPHAGEAL ECHOCARDIOGRAM (KARMEN) N/A 4/27/2018    Performed by Ward Ricks MD at [facility] CATH LAB    UPPER GASTROINTESTINAL ENDOSCOPY           Family History:  Family History   Problem Relation Age of Onset    Hypertension Mother      Cancer Father 49        stomach CA    Stomach cancer Father 49    Liver cancer Father     Rectal cancer Maternal Grandmother         dx in 70s    Celiac disease Neg Hx     Cirrhosis Neg Hx     Colon cancer Neg Hx     Colon polyps Neg Hx     Crohn's disease Neg Hx     Cystic fibrosis Neg Hx     Esophageal cancer Neg Hx     Hemochromatosis Neg Hx     Inflammatory bowel disease Neg Hx     Irritable bowel syndrome Neg Hx     Liver disease Neg Hx     Ulcerative colitis Neg Hx     Dain's disease Neg Hx     Lymphoma Neg Hx     Tuberculosis Neg Hx     Scleroderma Neg Hx     Rheum arthritis Neg Hx     Multiple sclerosis Neg Hx     Melanoma Neg Hx     Lupus Neg Hx     Psoriasis Neg Hx     Skin cancer Neg Hx        Social History:  Social History     Tobacco Use    Smoking status: Former Smoker     Packs/day: 0.25     Last attempt to quit: 12/3/2016     Years since quittin.9    Smokeless tobacco: Never Used   Substance and Sexual Activity    Alcohol use: No     Comment: pt states that she is a recoveirng alcoholic, last drink 11    Drug use: No    Sexual activity: unknown       ROS   Review of Systems   Constitutional: Positive for appetite change (decreased). Negative for chills and fever.   HENT: Negative for congestion and sore throat.    Respiratory: Negative for shortness of breath.    Cardiovascular: Negative for chest pain.   Gastrointestinal: Positive for abdominal pain (generalized, chronic), nausea (chronic) and vomiting (chronic). Negative for diarrhea.   Genitourinary: Positive for decreased urine volume. Negative for dysuria, frequency and hematuria.   Musculoskeletal: Negative for myalgias.   Skin: Negative for rash.   Neurological: Negative for weakness and numbness.   Hematological: Does not bruise/bleed easily.   Psychiatric/Behavioral: Positive for confusion.   All other systems reviewed and are negative.    Physical Exam      Initial Vitals [18 1633]   BP  "Pulse Resp Temp SpO2   124/66 80 18 98.2 °F (36.8 °C) 100 %      MAP       --          Physical Exam  Nursing Notes and Vital Signs Reviewed.  Constitutional: Patient is in no acute distress. Well-developed and well-nourished. Chronically ill appearing.   Head: Atraumatic. Normocephalic.  Eyes: PERRL. EOM intact. Conjunctivae are not pale. No scleral icterus.  ENT: Mucous membranes are dry. Oropharynx is clear and symmetric.    Neck: Supple. Full ROM. No lymphadenopathy.  Cardiovascular: Regular rate. Regular rhythm. No murmurs, rubs, or gallops. Distal pulses are 2+ and symmetric.  Pulmonary/Chest: No respiratory distress. Clear to auscultation bilaterally. No wheezing or rales.  Abdominal: Soft and non-distended.  There is diffuse tenderness.  G-tube in place. No rebound, guarding, or rigidity. Good bowel sounds.  Musculoskeletal: Moves all extremities. No obvious deformities. No edema. No calf tenderness.  Skin: Warm and dry.  Neurological:  Alert, awake, and appropriate.  Normal speech.  No acute focal neurological deficits are appreciated.  Psychiatric: Normal affect. Good eye contact. Appropriate in content.    ED Course    Procedures  ED Vital Signs:  Vitals:    11/30/18 1633 11/30/18 1859 11/30/18 1901 11/30/18 1917   BP: 124/66 (!) 114/55 (!) 107/56 (!) 101/54   Pulse: 80 76 77 77   Resp: 18      Temp: 98.2 °F (36.8 °C)      TempSrc: Oral      SpO2: 100% 99% 99% 98%   Weight: 76 kg (167 lb 8.8 oz)      Height: 5' 2" (1.575 m)       11/30/18 1932 11/30/18 1947   BP: (!) 103/58 (!) 97/53   Pulse: 77 77   Resp:     Temp:     TempSrc:     SpO2: 98% 98%   Weight:     Height:         Abnormal Lab Results:  Labs Reviewed   COMPREHENSIVE METABOLIC PANEL - Abnormal; Notable for the following components:       Result Value    ALT 8 (*)     All other components within normal limits   URINALYSIS, REFLEX TO URINE CULTURE - Abnormal; Notable for the following components:    Specific Gravity, UA >=1.030 (*)     Protein, " UA Trace (*)     Ketones, UA Trace (*)     Bilirubin (UA) 1+ (*)     All other components within normal limits    Narrative:     Preferred Collection Type->Urine, Clean Catch   CBC W/ AUTO DIFFERENTIAL   LIPASE   PREGNANCY TEST, URINE RAPID        All Lab Results:  Results for orders placed or performed during the hospital encounter of 11/30/18   CBC W/ AUTO DIFFERENTIAL   Result Value Ref Range    WBC 7.88 3.90 - 12.70 K/uL    RBC 4.45 4.00 - 5.40 M/uL    Hemoglobin 13.1 12.0 - 16.0 g/dL    Hematocrit 38.3 37.0 - 48.5 %    MCV 86 82 - 98 fL    MCH 29.4 27.0 - 31.0 pg    MCHC 34.2 32.0 - 36.0 g/dL    RDW 13.0 11.5 - 14.5 %    Platelets 195 150 - 350 K/uL    MPV 10.9 9.2 - 12.9 fL    Gran # (ANC) 5.3 1.8 - 7.7 K/uL    Lymph # 1.9 1.0 - 4.8 K/uL    Mono # 0.6 0.3 - 1.0 K/uL    Eos # 0.0 0.0 - 0.5 K/uL    Baso # 0.02 0.00 - 0.20 K/uL    Gran% 67.5 38.0 - 73.0 %    Lymph% 24.2 18.0 - 48.0 %    Mono% 7.5 4.0 - 15.0 %    Eosinophil% 0.5 0.0 - 8.0 %    Basophil% 0.3 0.0 - 1.9 %    Differential Method Automated    Comp. Metabolic Panel   Result Value Ref Range    Sodium 138 136 - 145 mmol/L    Potassium 3.9 3.5 - 5.1 mmol/L    Chloride 105 95 - 110 mmol/L    CO2 23 23 - 29 mmol/L    Glucose 93 70 - 110 mg/dL    BUN, Bld 12 6 - 20 mg/dL    Creatinine 0.8 0.5 - 1.4 mg/dL    Calcium 9.2 8.7 - 10.5 mg/dL    Total Protein 6.7 6.0 - 8.4 g/dL    Albumin 3.9 3.5 - 5.2 g/dL    Total Bilirubin 0.4 0.1 - 1.0 mg/dL    Alkaline Phosphatase 62 55 - 135 U/L    AST 15 10 - 40 U/L    ALT 8 (L) 10 - 44 U/L    Anion Gap 10 8 - 16 mmol/L    eGFR if African American >60 >60 mL/min/1.73 m^2    eGFR if non African American >60 >60 mL/min/1.73 m^2   Lipase   Result Value Ref Range    Lipase 22 4 - 60 U/L   Urinalysis, Reflex to Urine Culture Urine, Clean Catch   Result Value Ref Range    Specimen UA Urine, Clean Catch     Color, UA Yellow Yellow, Straw, [place]    Appearance, UA Clear Clear    pH, UA 6.0 5.0 - 8.0    Specific Gravity, UA >=1.030 (A)  1.005 - 1.030    Protein, UA Trace (A) Negative    Glucose, UA Negative Negative    Ketones, UA Trace (A) Negative    Bilirubin (UA) 1+ (A) Negative    Occult Blood UA Negative Negative    Nitrite, UA Negative Negative    Urobilinogen, UA Negative <2.0 EU/dL    Leukocytes, UA Negative Negative   Pregnancy, urine rapid (UPT)   Result Value Ref Range    Preg Test, Ur Negative        Imaging Results:  Imaging Results          X-Ray Abdomen Flat And Erect (Final result)  Result time 11/30/18 18:01:43    Final result by Reid Rucker III, MD (11/30/18 18:01:43)                 Impression:      As above.  Postop changes.  No free air.  No bowel obstruction.      Electronically signed by: Reid Rucker MD  Date:    11/30/2018  Time:    18:01             Narrative:    EXAMINATION:  XR ABDOMEN FLAT AND ERECT    CLINICAL HISTORY:  Nausea with vomiting, unspecified    COMPARISON:  August    FINDINGS:  Postop changes again noted along the lower abdomen and pelvis/spine region.  Nonspecific gas pattern without mass or obstruction.  No free air.                                        The Emergency Provider reviewed the vital signs and test results, which are outlined above.    ED Discussion     6:59 PM: Reassessed pt at this time.  Pt states her condition has improved at this time after Zofran and IVF. Pt is urinating without difficulty. Discussed with pt all pertinent ED information and results. Discussed pt dx and plan of tx. Gave pt all f/u and return to the ED instructions. All questions and concerns were addressed at this time. Pt expresses understanding of information and instructions, and is comfortable with plan to discharge. Pt is stable for discharge after IVF.    I discussed with patient and/or family/caretaker that evaluation in the ED does not suggest any emergent or life threatening medical conditions requiring immediate intervention beyond what was provided in the ED, and I believe patient is safe for  discharge.  Regardless, an unremarkable evaluation in the ED does not preclude the development or presence of a serious of life threatening condition. As such, patient was instructed to return immediately for any worsening or change in current symptoms.    ED Medication(s):  Medications   ondansetron injection 8 mg (8 mg Intravenous Given 11/30/18 1812)   sodium chloride 0.9% bolus 2,000 mL (0 mLs Intravenous Stopped 11/30/18 2022)     Current Discharge Medication List   none     Follow-up Information     Akanksha West MD. Schedule an appointment as soon as possible for a visit in 2 days.    Specialty:  Internal Medicine  Why:  Return to the Emergency Room, If symptoms worsen  Contact information:  [address]  [address]  [facility] [id number] [phone]                     Medical Decision Making    Medical Decision Making:   Clinical Tests:   Lab Tests: Ordered and Reviewed  Radiological Study: Reviewed and Ordered           Scribe Attestation:   Scribe #1: I performed the above scribed service and the documentation accurately describes the services I performed. I attest to the accuracy of the note.    Attending:   Physician Attestation Statement for Scribe #1: I, Nancy Spence MD, personally performed the services described in this documentation, as scribed by Allyson Styles, in my presence, and it is both accurate and complete.          Clinical Impression       ICD-10-CM ICD-9-CM   1. Chronic nausea R11.0 787.02   2. Nausea & vomiting R11.2 787.01   3. Intravascular volume depletion E86.1 276.52       Disposition:   Disposition: Discharged  Condition: Stable         Nancy Spence MD  11/30/18 5253

## 2018-12-01 ENCOUNTER — NURSE TRIAGE (OUTPATIENT)
Dept: ADMINISTRATIVE | Facility: CLINIC | Age: 38
End: 2018-12-01

## 2018-12-01 NOTE — ED NOTES
IVF NS x2L near completed, approx 300mL left.  Pt aware of plan for completed administration of IVF and DC, agrees with plan and to notify staff when fluids finished.

## 2018-12-01 NOTE — TELEPHONE ENCOUNTER
"    Reason for Disposition   [1] Drinking very little AND [2] dehydration suspected (e.g., no urine > 12 hours, very dry mouth, very lightheaded)    Answer Assessment - Initial Assessment Questions  1. DIARRHEA SEVERITY: "How bad is the diarrhea?" "How many extra stools have you had in the past 24 hours than normal?"     - MILD: Few loose or mushy BMs; increase of 1-3 stools over normal daily number of stools; mild increase in ostomy output.    - MODERATE: Increase of 4-6 stools daily over normal; moderate increase in ostomy output.    - SEVERE (or Worst Possible): Increase of 7 or more stools daily over normal; moderate increase in ostomy output; incontinence.      8 bowel movements today  2. ONSET: "When did the diarrhea begin?"       At 0300 this morning  3. BM CONSISTENCY: "How loose or watery is the diarrhea?"       watery  4. VOMITING: "Are you also vomiting?" If so, ask: "How many times in the past 24 hours?"       Denies vomiting since ED discharge yesterday  5. ABDOMINAL PAIN: "Are you having any abdominal pain?" If yes: "What does it feel like?" (e.g., crampy, dull, intermittent, constant)       Has chronic abdominal pain but not different than usual  6. ABDOMINAL PAIN SEVERITY: If present, ask: "How bad is the pain?"  (e.g., Scale 1-10; mild, moderate, or severe)     - MILD (1-3): doesn't interfere with normal activities, abdomen soft and not tender to touch      - MODERATE (4-7): interferes with normal activities or awakens from sleep, tender to touch      - SEVERE (8-10): excruciating pain, doubled over, unable to do any normal activities        n/a  7. ORAL INTAKE: If vomiting, "Have you been able to drink liquids?" "How much fluids have you had in the past 24 hours?"      Only tolerating sips of fluids  8. HYDRATION: "Any signs of dehydration?" (e.g., dry mouth [not just dry lips], too weak to stand, dizziness, new weight loss) "When did you last urinate?"      Denies symptoms of dehydration; " "urinated this morning  9. EXPOSURE: "Have you traveled to a foreign country recently?" "Have you been exposed to anyone with diarrhea?" "Could you have eaten any food that was spoiled?"      denies  10. OTHER SYMPTOMS: "Do you have any other symptoms?" (e.g., fever, blood in stool)        denies  11. PREGNANCY: "Is there any chance you are pregnant?" "When was your last menstrual period?"        n/a    Protocols used: ST DIARRHEA-A-      "

## 2018-12-04 ENCOUNTER — HOSPITAL ENCOUNTER (EMERGENCY)
Facility: HOSPITAL | Age: 38
Discharge: HOME | End: 2018-12-04
Attending: EMERGENCY MEDICINE
Payer: COMMERCIAL

## 2018-12-04 VITALS
TEMPERATURE: 99 F | HEART RATE: 84 BPM | WEIGHT: 167.13 LBS | SYSTOLIC BLOOD PRESSURE: 142 MMHG | RESPIRATION RATE: 18 BRPM | HEIGHT: 62 IN | BODY MASS INDEX: 30.76 KG/M2 | DIASTOLIC BLOOD PRESSURE: 77 MMHG | OXYGEN SATURATION: 97 %

## 2018-12-04 DIAGNOSIS — R74.8 ELEVATED LIPASE: Primary | ICD-10-CM

## 2018-12-04 DIAGNOSIS — R11.10 VOMITING, INTRACTABILITY OF VOMITING NOT SPECIFIED, PRESENCE OF NAUSEA NOT SPECIFIED, UNSPECIFIED VOMITING TYPE: ICD-10-CM

## 2018-12-04 DIAGNOSIS — R19.7 DIARRHEA, UNSPECIFIED TYPE: ICD-10-CM

## 2018-12-04 LAB
ALBUMIN SERPL BCP-MCNC: 4 G/DL
ALP SERPL-CCNC: 63 U/L
ALT SERPL W/O P-5'-P-CCNC: 13 U/L
ANION GAP SERPL CALC-SCNC: 10 MMOL/L
AST SERPL-CCNC: 21 U/L
B-HCG UR QL: NEGATIVE
BASOPHILS # BLD AUTO: 0.02 K/UL
BASOPHILS NFR BLD: 0.2 %
BILIRUB SERPL-MCNC: 0.7 MG/DL
BILIRUB UR QL STRIP: NEGATIVE
BUN SERPL-MCNC: 12 MG/DL
CALCIUM SERPL-MCNC: 9 MG/DL
CHLORIDE SERPL-SCNC: 105 MMOL/L
CLARITY UR: CLEAR
CO2 SERPL-SCNC: 24 MMOL/L
COLOR UR: YELLOW
CREAT SERPL-MCNC: 0.8 MG/DL
DIFFERENTIAL METHOD: NORMAL
EOSINOPHIL # BLD AUTO: 0.1 K/UL
EOSINOPHIL NFR BLD: 0.5 %
ERYTHROCYTE [DISTWIDTH] IN BLOOD BY AUTOMATED COUNT: 13.1 %
EST. GFR  (AFRICAN AMERICAN): >60 ML/MIN/1.73 M^2
EST. GFR  (NON AFRICAN AMERICAN): >60 ML/MIN/1.73 M^2
GLUCOSE SERPL-MCNC: 89 MG/DL
GLUCOSE UR QL STRIP: NEGATIVE
HCT VFR BLD AUTO: 40.7 %
HGB BLD-MCNC: 13.8 G/DL
HGB UR QL STRIP: NEGATIVE
KETONES UR QL STRIP: NEGATIVE
LEUKOCYTE ESTERASE UR QL STRIP: NEGATIVE
LIPASE SERPL-CCNC: 259 U/L
LYMPHOCYTES # BLD AUTO: 1.9 K/UL
LYMPHOCYTES NFR BLD: 20.1 %
MCH RBC QN AUTO: 29.6 PG
MCHC RBC AUTO-ENTMCNC: 33.9 G/DL
MCV RBC AUTO: 87 FL
MONOCYTES # BLD AUTO: 0.7 K/UL
MONOCYTES NFR BLD: 7.4 %
NEUTROPHILS # BLD AUTO: 6.6 K/UL
NEUTROPHILS NFR BLD: 71.8 %
NITRITE UR QL STRIP: NEGATIVE
PH UR STRIP: 6 [PH] (ref 5–8)
PLATELET # BLD AUTO: 213 K/UL
PMV BLD AUTO: 11.1 FL
POTASSIUM SERPL-SCNC: 4 MMOL/L
PROT SERPL-MCNC: 6.9 G/DL
PROT UR QL STRIP: NEGATIVE
RBC # BLD AUTO: 4.66 M/UL
SODIUM SERPL-SCNC: 139 MMOL/L
SP GR UR STRIP: 1.02 (ref 1–1.03)
URN SPEC COLLECT METH UR: NORMAL
UROBILINOGEN UR STRIP-ACNC: NEGATIVE EU/DL
WBC # BLD AUTO: 9.25 K/UL

## 2018-12-04 PROCEDURE — 85025 COMPLETE CBC W/AUTO DIFF WBC: CPT

## 2018-12-04 PROCEDURE — 80053 COMPREHEN METABOLIC PANEL: CPT

## 2018-12-04 PROCEDURE — 25500020 PHARM REV CODE 255: Performed by: PHYSICIAN ASSISTANT

## 2018-12-04 PROCEDURE — 99285 EMERGENCY DEPT VISIT HI MDM: CPT | Mod: 25

## 2018-12-04 PROCEDURE — 83690 ASSAY OF LIPASE: CPT

## 2018-12-04 PROCEDURE — 63600175 PHARM REV CODE 636 W HCPCS: Performed by: PHYSICIAN ASSISTANT

## 2018-12-04 PROCEDURE — 96375 TX/PRO/DX INJ NEW DRUG ADDON: CPT

## 2018-12-04 PROCEDURE — 81025 URINE PREGNANCY TEST: CPT

## 2018-12-04 PROCEDURE — 25000003 PHARM REV CODE 250: Performed by: PHYSICIAN ASSISTANT

## 2018-12-04 PROCEDURE — 96361 HYDRATE IV INFUSION ADD-ON: CPT

## 2018-12-04 PROCEDURE — 36415 COLL VENOUS BLD VENIPUNCTURE: CPT

## 2018-12-04 PROCEDURE — 81003 URINALYSIS AUTO W/O SCOPE: CPT

## 2018-12-04 PROCEDURE — 96374 THER/PROPH/DIAG INJ IV PUSH: CPT

## 2018-12-04 RX ORDER — HYDROCODONE BITARTRATE AND ACETAMINOPHEN 5; 325 MG/1; MG/1
1 TABLET ORAL EVERY 4 HOURS PRN
Qty: 18 TABLET | Refills: 0 | Status: SHIPPED | OUTPATIENT
Start: 2018-12-04 | End: 2019-01-21

## 2018-12-04 RX ORDER — PROMETHAZINE HYDROCHLORIDE 25 MG/1
25 TABLET ORAL EVERY 6 HOURS PRN
Qty: 15 TABLET | Refills: 0 | Status: SHIPPED | OUTPATIENT
Start: 2018-12-04 | End: 2019-02-16

## 2018-12-04 RX ORDER — HYDROMORPHONE HYDROCHLORIDE 2 MG/ML
1 INJECTION, SOLUTION INTRAMUSCULAR; INTRAVENOUS; SUBCUTANEOUS
Status: COMPLETED | OUTPATIENT
Start: 2018-12-04 | End: 2018-12-04

## 2018-12-04 RX ORDER — METOCLOPRAMIDE HYDROCHLORIDE 5 MG/ML
10 INJECTION INTRAMUSCULAR; INTRAVENOUS
Status: COMPLETED | OUTPATIENT
Start: 2018-12-04 | End: 2018-12-04

## 2018-12-04 RX ADMIN — SODIUM CHLORIDE 1000 ML: 0.9 INJECTION, SOLUTION INTRAVENOUS at 05:12

## 2018-12-04 RX ADMIN — IOHEXOL 75 ML: 350 INJECTION, SOLUTION INTRAVENOUS at 06:12

## 2018-12-04 RX ADMIN — METOCLOPRAMIDE 10 MG: 5 INJECTION, SOLUTION INTRAMUSCULAR; INTRAVENOUS at 05:12

## 2018-12-04 RX ADMIN — HYDROMORPHONE HYDROCHLORIDE 1 MG: 2 INJECTION INTRAMUSCULAR; INTRAVENOUS; SUBCUTANEOUS at 05:12

## 2018-12-04 NOTE — ED PROVIDER NOTES
SCRIBE #1 NOTE: I, Dorota Myrick, am scribing for, and in the presence of, LUZ ELENA Meadows. I have scribed the entire note.       History     Chief Complaint   Patient presents with    Abdominal Pain     n/v, diarrhea, LUQ abdominal pain     Review of patient's allergies indicates:   Allergen Reactions    Sulfa (sulfonamide antibiotics)          History of Present Illness     HPI    12/4/2018, 4:13 PM  History obtained from the patient      History of Present Illness: Lia Avila is a 38 y.o. female patient with a PMH of aerophagia, IBS, Acid Reflux, SBO, PSHx of cholecystectomy, Colon Resection, Diagnostic laparoscopy, Gastrostomy who presents to the Emergency Department for evaluation of worsening LUQ abd pain which onset gradually a few days ago. Symptoms are constant and moderate in severity. Pt states she has a G tube and has an appointment with GI in 3 days. Pt had a J tube in the past. No mitigating or exacerbating factors reported. Associated sxs include diarrhea (4 episodes today), nausea, and emesis. Patient denies any fever, chills, constipation, hematochezia, dysuria, hematuria, flank pain, and all other sxs at this time. Prior Tx includes zofran. Pt was seen in the ED on 11/30/18 for dehydration. No further complaints or concerns at this time.         Arrival mode: Personal vehicle      PCP: Akanksha West MD        Past Medical History:  Past Medical History:   Diagnosis Date    Acid reflux     Aerophagia     Alcoholic     recovering    Anxiety     Depression     Functional gastrointestinal disorder     IBS (irritable bowel syndrome)     Irritable bowel syndrome     Sleep apnea     Trivial aortic valve anomaly        Past Surgical History:  Past Surgical History:   Procedure Laterality Date    BACK SURGERY      fusion of L4, L5, and S1    CHEST TUBE PLACEMENT Left 1/16/2017    Performed by Louis O. Jeansonne IV, [facility] at [facility] OR    CHOLECYSTECTOMY      COLON SURGERY       COLONOSCOPY      COLONOSCOPY N/A 4/13/2018    Procedure: Colonoscopy;  Surgeon: Apurva Salinas MD;  Location: [facility] (4TH FLR);  Service: Endoscopy;  Laterality: N/A;  5 days of full liquids then 24 hours clear liquids    Colonoscopy N/A 4/13/2018    Performed by Apurva Salinas MD at [facility] (4TH FLR)    ESOPHAGEAL MANOMETRY WITH MEASUREMENT OF IMPEDANCE N/A 10/8/2018    Procedure: MANOMETRY, ESOPHAGUS, WITH IMPEDANCE MEASUREMENT;  Surgeon: Auprva Salinas MD;  Location: [facility] (4TH FLR);  Service: Endoscopy;  Laterality: N/A;    ESOPHAGOGASTRODUODENOSCOPY (EGD) N/A 4/13/2018    Performed by Apurva Salinas MD at [facility] (4TH FLR)    ESOPHAGOGASTRODUODENOSCOPY (EGD) N/A 3/28/2017    Per[facility] by Pop Schwab MD at [facility] ENDO    GASTROSTOMY-JEJEUNOSTOMY TUBE CHANGE/PLACEMENT      LAPAROSCOPY-DIAGNOSTIC N/A 1/3/2017    Performed by Louis O. Jeansonne IV, MD at [facility] OR    ERDYK-DDSFGBGM-NVUXYYORJYNM N/A 1/3/2017    Performed by Louis O. Jeansonne IV, MD at [facility] OR    MANOMETRY, ESOPHAGUS, WITH IMPEDANCE MEASUREMENT N/A 10/8/2018    Performed by Apurva Salinas MD at [facility] (4TH FLR)    MANOMETRY-ANORECTAL N/A 4/3/2018    Performed by Apurva Salinas MD at [facility] (4TH FLR)    RESECTION - SMALL BOWEL N/A 1/3/2017    Performed by Louis O. Jeansonne IV, MD at [facility] OR    SMALL INTESTINE SURGERY      TRANSESOPHAGEAL ECHOCARDIOGRAM (KARMEN) N/A 4/27/2018    Performed by Ward Ricks MD at [facility] CATH LAB    UPPER GASTROINTESTINAL ENDOSCOPY           Family History:  Family History   Problem Relation Age of Onset    Hypertension Mother     Cancer Father 49        stomach CA    Stomach cancer Father 49    Liver cancer Father     Rectal cancer Maternal Grandmother         dx in 70s    Celiac disease Neg Hx     Cirrhosis Neg Hx     Colon cancer Neg Hx     Colon polyps Neg Hx     Crohn's disease Neg Hx     Cystic fibrosis Neg Hx     Esophageal cancer Neg Hx     Hemochromatosis Neg Hx      Inflammatory bowel disease Neg Hx     Irritable bowel syndrome Neg Hx     Liver disease Neg Hx     Ulcerative colitis Neg Hx     Dain's disease Neg Hx     Lymphoma Neg Hx     Tuberculosis Neg Hx     Scleroderma Neg Hx     Rheum arthritis Neg Hx     Multiple sclerosis Neg Hx     Melanoma Neg Hx     Lupus Neg Hx     Psoriasis Neg Hx     Skin cancer Neg Hx        Social History:  Social History     Tobacco Use    Smoking status: Former Smoker     Packs/day: 0.25     Last attempt to quit: 12/3/2016     Years since quittin.0    Smokeless tobacco: Never Used   Substance and Sexual Activity    Alcohol use: No     Comment: pt states that she is a recoveirng alcoholic, last drink 11    Drug use: No    Sexual activity: Unknown        Review of Systems     Review of Systems   Constitutional: Negative for chills and fever.   HENT: Negative for congestion and sore throat.    Respiratory: Negative for cough and shortness of breath.    Cardiovascular: Negative for chest pain and leg swelling.   Gastrointestinal: Positive for abdominal pain (LUQ), diarrhea, nausea and vomiting. Negative for anal bleeding, blood in stool, constipation and rectal pain.   Genitourinary: Negative for dysuria, flank pain and hematuria.   Musculoskeletal: Negative for back pain and myalgias.   Skin: Negative for rash and wound.   Neurological: Negative for dizziness, light-headedness and headaches.   Psychiatric/Behavioral: Negative for agitation and confusion.   All other systems reviewed and are negative.       Physical Exam     Initial Vitals [18 1536]   BP Pulse Resp Temp SpO2   (!) 141/67 88 17 98.1 °F (36.7 °C) 99 %      MAP       --          Physical Exam  Nursing Notes and Vital Signs Reviewed.  Constitutional: Patient is in no apparent distress. Well-developed and well-nourished.  Head: Atraumatic. Normocephalic.  Eyes: PERRL. EOM intact. Conjunctivae are not pale. No scleral icterus.  ENT: Mucous membranes  "are moist. Oropharynx is clear and symmetric.    Neck: Supple. Full ROM. No lymphadenopathy.  Cardiovascular: Regular rate. Regular rhythm. No murmurs, rubs, or gallops. Distal pulses are 2+ and symmetric.  Pulmonary/Chest: No respiratory distress. Clear to auscultation bilaterally. No wheezing or rales.  Abdominal: Soft and non-distended.  There is no tenderness.  No rebound, guarding, or rigidity. Good bowel sounds.  Genitourinary: No CVA tenderness  Musculoskeletal: Moves all extremities. No obvious deformities. No edema. No calf tenderness.  Skin: Warm and dry.  Neurological:  Alert, awake, and appropriate.  Normal speech.  No acute focal neurological deficits are appreciated.  Psychiatric: Normal affect. Good eye contact. Appropriate in content.     ED Course   Procedures  ED Vital Signs:  Vitals:    12/04/18 1536 12/04/18 1701   BP: (!) 141/67 (!) 146/83   Pulse: 88 89   Resp: 17 19   Temp: 98.1 °F (36.7 °C) 98.3 °F (36.8 °C)   TempSrc: Oral Oral   SpO2: 99% 100%   Weight: 75.8 kg (167 lb 1.7 oz)    Height: 5' 2" (1.575 m)        Abnormal Lab Results:  Labs Reviewed   LIPASE - Abnormal; Notable for the following components:       Result Value    Lipase 259 (*)     All other components within normal limits   URINALYSIS, REFLEX TO URINE CULTURE    Narrative:     Preferred Collection Type->Urine, Clean Catch   PREGNANCY TEST, URINE RAPID   CBC W/ AUTO DIFFERENTIAL   COMPREHENSIVE METABOLIC PANEL        All Lab Results:  Results for orders placed or performed during the hospital encounter of 12/04/18   Urinalysis, Reflex to Urine Culture Urine, Clean Catch   Result Value Ref Range    Specimen UA Urine, Clean Catch     Color, UA Yellow Yellow, Straw, [place]    Appearance, UA Clear Clear    pH, UA 6.0 5.0 - 8.0    Specific Gravity, UA 1.020 1.005 - 1.030    Protein, UA Negative Negative    Glucose, UA Negative Negative    Ketones, UA Negative Negative    Bilirubin (UA) Negative Negative    Occult Blood UA Negative " Negative    Nitrite, UA Negative Negative    Urobilinogen, UA Negative <2.0 EU/dL    Leukocytes, UA Negative Negative   Pregnancy, urine rapid   Result Value Ref Range    Preg Test, Ur Negative    CBC auto differential   Result Value Ref Range    WBC 9.25 3.90 - 12.70 K/uL    RBC 4.66 4.00 - 5.40 M/uL    Hemoglobin 13.8 12.0 - 16.0 g/dL    Hematocrit 40.7 37.0 - 48.5 %    MCV 87 82 - 98 fL    MCH 29.6 27.0 - 31.0 pg    MCHC 33.9 32.0 - 36.0 g/dL    RDW 13.1 11.5 - 14.5 %    Platelets 213 150 - 350 K/uL    MPV 11.1 9.2 - 12.9 fL    Gran # (ANC) 6.6 1.8 - 7.7 K/uL    Lymph # 1.9 1.0 - 4.8 K/uL    Mono # 0.7 0.3 - 1.0 K/uL    Eos # 0.1 0.0 - 0.5 K/uL    Baso # 0.02 0.00 - 0.20 K/uL    Gran% 71.8 38.0 - 73.0 %    Lymph% 20.1 18.0 - 48.0 %    Mono% 7.4 4.0 - 15.0 %    Eosinophil% 0.5 0.0 - 8.0 %    Basophil% 0.2 0.0 - 1.9 %    Differential Method Automated    Comprehensive metabolic panel   Result Value Ref Range    Sodium 139 136 - 145 mmol/L    Potassium 4.0 3.5 - 5.1 mmol/L    Chloride 105 95 - 110 mmol/L    CO2 24 23 - 29 mmol/L    Glucose 89 70 - 110 mg/dL    BUN, Bld 12 6 - 20 mg/dL    Creatinine 0.8 0.5 - 1.4 mg/dL    Calcium 9.0 8.7 - 10.5 mg/dL    Total Protein 6.9 6.0 - 8.4 g/dL    Albumin 4.0 3.5 - 5.2 g/dL    Total Bilirubin 0.7 0.1 - 1.0 mg/dL    Alkaline Phosphatase 63 55 - 135 U/L    AST 21 10 - 40 U/L    ALT 13 10 - 44 U/L    Anion Gap 10 8 - 16 mmol/L    eGFR if African American >60 >60 mL/min/1.73 m^2    eGFR if non African American >60 >60 mL/min/1.73 m^2   Lipase   Result Value Ref Range    Lipase 259 (H) 4 - 60 U/L         Imaging Results:  Imaging Results          CT Abdomen Pelvis With Contrast (Final result)  Result time 12/04/18 18:41:51    Final result by Jerry Chi MD (12/04/18 18:41:51)                 Impression:      1.  Interval development of intrahepatic and extrahepatic ductal dilation with the common bile duct measuring maximum of 9 mm.  Possible choledochal with in the distal  common duct.  Correlation with liver function tests recommended.  Patient may benefit from an MRCP to further evaluate.    2. Negative for acute process involving the abdomen or pelvis otherwise.  Negative for inflammatory changes.  Normal appendix.  Negative for renal stone disease or hydronephrosis.    3.  Stable gastrostomy tube in place.  Cholecystectomy clips.  Significant postoperative changes involve the lower lumbar spine with grade 2 spondylolisthesis of L5 on S1.    4.  Multiple ovarian cysts measuring up to 2.2 cm.  Follow-up pelvic ultrasound in 2-3 months recommended.    5.  Stable 6 mm left lower lobe nodule.  This most likely is a focal area of scarring related to the fact that the patient had a left-sided chest tube in place in January 2017.    6.  Stable findings as noted above.    All CT scans at this facility are performed  using dose modulation techniques as appropriate to performed exam including the following:  automated exposure control; adjustment of mA and/or kV according to the patients size (this includes techniques or standardized protocols for targeted exams where dose is matched to indication/reason for exam: i.e. extremities or head);  iterative reconstruction technique.      Electronically signed by: Jerry Chi MD  Date:    12/04/2018  Time:    18:41             Narrative:    EXAMINATION:  CT ABDOMEN PELVIS WITH CONTRAST    CLINICAL HISTORY:  Nausea, vomiting, diarrhea;    TECHNIQUE:  Axial images through the abdomen and pelvis were obtained with the use of IV contrast.  Sagittal and coronal reconstructions are provided for review.  Oral contrast was not utilized.    COMPARISON:  August 14, 2018    FINDINGS:  LUNG BASES: Again seen is a 6 mm nodule in the lateral left lower lobe.  Minimal dependent atelectasis in the left lung base.  Lung bases are otherwise clear.  Negative for pleural or pericardial effusions. The distal esophagus is normal.    ABDOMEN: Again seen is a  gastrostomy tube in place.  There are cholecystectomy clips.  Interval increase in the caliber of the common bile duct, currently measuring 9 mm, with mild intrahepatic ductal dilation.  On image 59 of series 2, there may be a choledochal live in the distal common bile.  The liver and spleen appear normal.  The pancreas is unremarkable.  Kidneys and adrenal glands are normal.    Negative for adenopathy, free fluid or inflammatory change noted within the abdomen or pelvis.  Negative for vascular abnormalities.  Portal vein is patent.    The bowel appears normal. The cecum is centrally positioned in the abdomen.  Normal appendix.    PELVIS: The urinary bladder is unremarkable.  Multiple bilateral ovarian cysts measuring up to 2.2 cm.  The rest of the female pelvic organs are normal. There are pelvic phleboliths.    No significant osseous abnormality is identified.  Negative for significant spinal canal stenosis. Stable hardware in the lower lumbar spine with grade 1-2 spondylolisthesis of L5 on S1.    Negative for groin adenopathy.    Again seen is significant laxity of the anterior abdominal wall with a wide-based hernia present.  The abdominal wall is otherwise intact.                                            The Emergency Provider reviewed the vital signs and test results, which are outlined above.     ED Discussion     7:45 PM: LUZ ELENA Meadows discussed the pt's case with Dr. Schwab (GI) who recommends no need for MRCP. 9 mm from the CT is most likely from the result of surgery. Pt's lipase is from chronic bowel problems.    7:53 PM: Discussed with pt all pertinent ED information and results. Discussed pt dx of and plan of tx. Explained CT results to pt. Advised pt to f/u with PCP in 2 days. Gave pt all f/u and return to the ED instructions. All questions and concerns were addressed at this time. Pt expresses understanding of information and instructions, and is comfortable with plan to discharge. Pt is  stable for discharge.      I discussed with patient and/or family/caretaker that evaluation in the ED does not suggest any emergent or life threatening medical conditions requiring immediate intervention beyond what was provided in the ED, and I believe patient is safe for discharge.  Regardless, an unremarkable evaluation in the ED does not preclude the development or presence of a serious of life threatening condition. As such, patient was instructed to return immediately for any worsening or change in current symptoms.      ED Medication(s):  Medications   sodium chloride 0.9% bolus 1,000 mL (1,000 mLs Intravenous New Bag 12/4/18 1704)   hydromorphone (PF) injection 1 mg (1 mg Intravenous Given 12/4/18 1704)   metoclopramide HCl injection 10 mg (10 mg Intravenous Given 12/4/18 1704)   omnipaque 350 iohexol 75 mL (75 mLs Intravenous Given 12/4/18 1803)       Current Discharge Medication List      START taking these medications    Details   HYDROcodone-acetaminophen (NORCO) 5-325 mg per tablet Take 1 tablet by mouth every 4 (four) hours as needed.  Qty: 18 tablet, Refills: 0      promethazine (PHENERGAN) 25 MG tablet Take 1 tablet (25 mg total) by mouth every 6 (six) hours as needed.  Qty: 15 tablet, Refills: 0               Follow-up Information     Akanksha West MD. Go in 2 days.    Specialty:  Internal Medicine  Contact information:  [address]  [address]  [facility] [address] [phone]                          Medical Decision Making     Medical Decision Making:   Clinical Tests:   Lab Tests: Ordered and Reviewed  Radiological Study: Ordered and Reviewed             Scribe Attestation:   Scribe #1: I performed the above scribed service and the documentation accurately describes the services I performed. I attest to the accuracy of the note. 12/04/2018 7:53 PM    Attending:   Physician Attestation Statement for Scribe #1: I, LUZ ELENA Meadows  , personally performed the services described in  this documentation, as scribed by Dorota Myrick, in my presence, and it is both accurate and complete.           Clinical Impression       ICD-10-CM ICD-9-CM   1. Elevated lipase R74.8 790.5   2. Vomiting, intractability of vomiting not specified, presence of nausea not specified, unspecified vomiting type R11.10 787.03   3. Diarrhea, unspecified type R19.7 787.91       Disposition:   Disposition: Discharged  Condition: Stable               LUZ ELENA Villagomez  12/06/18 0813

## 2018-12-15 ENCOUNTER — HOSPITAL ENCOUNTER (EMERGENCY)
Facility: HOSPITAL | Age: 38
Discharge: HOME | End: 2018-12-16
Attending: EMERGENCY MEDICINE
Payer: COMMERCIAL

## 2018-12-15 DIAGNOSIS — K31.84 GASTROPARESIS: ICD-10-CM

## 2018-12-15 DIAGNOSIS — K21.9 GASTROESOPHAGEAL REFLUX DISEASE, ESOPHAGITIS PRESENCE NOT SPECIFIED: Primary | ICD-10-CM

## 2018-12-15 DIAGNOSIS — F45.8 AEROPHAGIA: Chronic | ICD-10-CM

## 2018-12-15 DIAGNOSIS — F45.8: Chronic | ICD-10-CM

## 2018-12-15 LAB
ALBUMIN SERPL BCP-MCNC: 4.5 G/DL
ALP SERPL-CCNC: 69 U/L
ALT SERPL W/O P-5'-P-CCNC: 13 U/L
ANION GAP SERPL CALC-SCNC: 12 MMOL/L
AST SERPL-CCNC: 19 U/L
BASOPHILS # BLD AUTO: 0.02 K/UL
BASOPHILS NFR BLD: 0.3 %
BILIRUB SERPL-MCNC: 0.7 MG/DL
BUN SERPL-MCNC: 12 MG/DL
CALCIUM SERPL-MCNC: 10 MG/DL
CHLORIDE SERPL-SCNC: 100 MMOL/L
CO2 SERPL-SCNC: 29 MMOL/L
CREAT SERPL-MCNC: 0.9 MG/DL
DIFFERENTIAL METHOD: NORMAL
EOSINOPHIL # BLD AUTO: 0 K/UL
EOSINOPHIL NFR BLD: 0.5 %
ERYTHROCYTE [DISTWIDTH] IN BLOOD BY AUTOMATED COUNT: 12.7 %
EST. GFR  (AFRICAN AMERICAN): >60 ML/MIN/1.73 M^2
EST. GFR  (NON AFRICAN AMERICAN): >60 ML/MIN/1.73 M^2
GLUCOSE SERPL-MCNC: 93 MG/DL
HCT VFR BLD AUTO: 41 %
HGB BLD-MCNC: 14 G/DL
INR PPP: 0.9
LIPASE SERPL-CCNC: 86 U/L
LYMPHOCYTES # BLD AUTO: 2.4 K/UL
LYMPHOCYTES NFR BLD: 31.4 %
MCH RBC QN AUTO: 29.7 PG
MCHC RBC AUTO-ENTMCNC: 34.1 G/DL
MCV RBC AUTO: 87 FL
MONOCYTES # BLD AUTO: 0.6 K/UL
MONOCYTES NFR BLD: 7.9 %
NEUTROPHILS # BLD AUTO: 4.6 K/UL
NEUTROPHILS NFR BLD: 59.9 %
PLATELET # BLD AUTO: 258 K/UL
PMV BLD AUTO: 10.8 FL
POTASSIUM SERPL-SCNC: 3.5 MMOL/L
PROT SERPL-MCNC: 7.3 G/DL
PROTHROMBIN TIME: 10.3 SEC
RBC # BLD AUTO: 4.71 M/UL
SODIUM SERPL-SCNC: 141 MMOL/L
WBC # BLD AUTO: 7.59 K/UL

## 2018-12-15 PROCEDURE — 85025 COMPLETE CBC W/AUTO DIFF WBC: CPT

## 2018-12-15 PROCEDURE — 80053 COMPREHEN METABOLIC PANEL: CPT

## 2018-12-15 PROCEDURE — 63600175 PHARM REV CODE 636 W HCPCS: Performed by: EMERGENCY MEDICINE

## 2018-12-15 PROCEDURE — 99284 EMERGENCY DEPT VISIT MOD MDM: CPT | Mod: 25

## 2018-12-15 PROCEDURE — 96375 TX/PRO/DX INJ NEW DRUG ADDON: CPT

## 2018-12-15 PROCEDURE — 85610 PROTHROMBIN TIME: CPT

## 2018-12-15 PROCEDURE — 96374 THER/PROPH/DIAG INJ IV PUSH: CPT

## 2018-12-15 PROCEDURE — 83690 ASSAY OF LIPASE: CPT

## 2018-12-15 PROCEDURE — 96361 HYDRATE IV INFUSION ADD-ON: CPT

## 2018-12-15 PROCEDURE — 25000003 PHARM REV CODE 250: Performed by: EMERGENCY MEDICINE

## 2018-12-15 RX ORDER — HYDROMORPHONE HYDROCHLORIDE 2 MG/ML
1 INJECTION, SOLUTION INTRAMUSCULAR; INTRAVENOUS; SUBCUTANEOUS
Status: COMPLETED | OUTPATIENT
Start: 2018-12-15 | End: 2018-12-15

## 2018-12-15 RX ORDER — METOCLOPRAMIDE HYDROCHLORIDE 5 MG/ML
5 INJECTION INTRAMUSCULAR; INTRAVENOUS
Status: COMPLETED | OUTPATIENT
Start: 2018-12-15 | End: 2018-12-15

## 2018-12-15 RX ADMIN — HYDROMORPHONE HYDROCHLORIDE 1 MG: 2 INJECTION INTRAMUSCULAR; INTRAVENOUS; SUBCUTANEOUS at 11:12

## 2018-12-15 RX ADMIN — METOCLOPRAMIDE 5 MG: 5 INJECTION, SOLUTION INTRAMUSCULAR; INTRAVENOUS at 10:12

## 2018-12-15 RX ADMIN — SODIUM CHLORIDE 1000 ML: 0.9 INJECTION, SOLUTION INTRAVENOUS at 11:12

## 2018-12-15 RX ADMIN — LORAZEPAM 1 MG: 2 INJECTION INTRAMUSCULAR; INTRAVENOUS at 10:12

## 2018-12-15 RX ADMIN — SODIUM CHLORIDE 1000 ML: 0.9 INJECTION, SOLUTION INTRAVENOUS at 10:12

## 2018-12-16 VITALS
HEART RATE: 85 BPM | DIASTOLIC BLOOD PRESSURE: 67 MMHG | TEMPERATURE: 98 F | HEIGHT: 62 IN | OXYGEN SATURATION: 99 % | SYSTOLIC BLOOD PRESSURE: 123 MMHG | WEIGHT: 160.94 LBS | RESPIRATION RATE: 18 BRPM | BODY MASS INDEX: 29.62 KG/M2

## 2018-12-16 LAB
BACTERIA #/AREA URNS HPF: ABNORMAL /HPF
BILIRUB UR QL STRIP: NEGATIVE
CLARITY UR: CLEAR
COLOR UR: YELLOW
GLUCOSE UR QL STRIP: NEGATIVE
HGB UR QL STRIP: ABNORMAL
KETONES UR QL STRIP: ABNORMAL
LEUKOCYTE ESTERASE UR QL STRIP: NEGATIVE
MICROSCOPIC COMMENT: ABNORMAL
NITRITE UR QL STRIP: NEGATIVE
PH UR STRIP: 8 [PH] (ref 5–8)
PROT UR QL STRIP: NEGATIVE
RBC #/AREA URNS HPF: 24 /HPF (ref 0–4)
SP GR UR STRIP: 1.01 (ref 1–1.03)
SQUAMOUS #/AREA URNS HPF: 0 /HPF
URN SPEC COLLECT METH UR: ABNORMAL
UROBILINOGEN UR STRIP-ACNC: NEGATIVE EU/DL
WBC #/AREA URNS HPF: 0 /HPF (ref 0–5)

## 2018-12-16 PROCEDURE — 81000 URINALYSIS NONAUTO W/SCOPE: CPT

## 2018-12-16 NOTE — ED PROVIDER NOTES
SCRIBE #1 NOTE: I, Irma Villeda, am scribing for, and in the presence of, Clem Lovell Jr., MD. I have scribed the entire note.       History     Chief Complaint   Patient presents with    Emesis     states having upper abd pain with radiation to back. States feeding thru PEG tube and vomiting, unable to keep food down. Seen 2x last few weeks for dehydration     Review of patient's allergies indicates:   Allergen Reactions    Sulfa (sulfonamide antibiotics)          History of Present Illness     HPI    12/15/2018, 10:16 PM  History obtained from the patient      History of Present Illness: Lia Avila is a 38 y.o. female patient with a PMHx of aerophagia, IBS, depression, digestive disorder, OCD, gastroparesis, anemia, abd pain, HLD, and GERD who presents to the Emergency Department for evaluation of emesis which onset gradually x3 weeks ago. Pt states she is having upper abd pain radiating to back. Pt reports not being able to keep down food via peg tube or per os. Symptoms are constant and moderate in severity. No mitigating or exacerbating factors reported. Associated sxs include nausea. Patient denies any fever, chills, constipation, hematochezia, dysuria, hematuria, urinary frequency, diarrhea, and all other sxs at this time. Prior Tx includes nausea medication with no relief. It is noted pt was seen x2 weeks ago for dehydration. No further complaints or concerns at this time.       Arrival mode: Personal vehicle     PCP: Akanksha West MD        Past Medical History:  Past Medical History:   Diagnosis Date    Acid reflux     Aerophagia     Alcoholic     recovering    Anxiety     Depression     Functional gastrointestinal disorder     IBS (irritable bowel syndrome)     Irritable bowel syndrome     Sleep apnea     Trivial aortic valve anomaly        Past Surgical History:  Past Surgical History:   Procedure Laterality Date    BACK SURGERY      fusion of L4, L5, and S1    CHEST TUBE  PLACEMENT Left 1/16/2017    Performed by Louis O. Jeansonne IV, MD at [facility] OR    CHOLECYSTECTOMY      COLON SURGERY      COLONOSCOPY      COLONOSCOPY N/A 4/13/2018    Procedure: Colonoscopy;  Surgeon: Apurva Salinas MD;  [address]  Service: Endoscopy;  Laterality: N/A;  5 days of full liquids then 24 hours clear liquids    Colonoscopy N/A 4/13/2018    Performed by Apurva Salinas MD at [facility] (4TH FLR)    ESOPHAGEAL MANOMETRY WITH MEASUREMENT OF IMPEDANCE N/A 10/8/2018    Procedure: MANOMETRY, ESOPHAGUS, WITH IMPEDANCE MEASUREMENT;  Surgeon: Apurva Salinas MD;  [facility]  Service: Endoscopy;  Laterality: N/A;    ESOPHAGOGASTRODUODENOSCOPY (EGD) N/A 4/13/2018    Performed by Apurva Salinas MD at [facility] (4TH FLR)    ESOPHAGOGASTRODUODENOSCOPY (EGD) N/A 3/28/2017    Performed by Pop Schwab MD at [facility] ENDO    GASTROSTOMY-JEJEUNOSTOMY TUBE CHANGE/PLACEMENT      LAPAROSCOPY-DIAGNOSTIC N/A 1/3/2017    Performed by Louis O. Jeansonne IV, MD at [facility] OR    TIURP-[id number]-[id number] N/A 1/3/2017    Performed by Louis O. Jeansonne IV, MD at [facility] OR    MANOMETRY, ESOPHAGUS, WITH IMPEDANCE MEASUREMENT N/A 10/8/2018    Performed by Apurva Salinas MD at [facility] (4TH FLR)    MANOMETRY-ANORECTAL N/A 4/3/2018    Performed by Apurva Salinas MD at [facility] (4TH FLR)    RESECTION - SMALL BOWEL N/A 1/3/2017    Performed by Louis O. Jeansonne IV, MD at [facility] OR    SMALL INTESTINE SURGERY      TRANSESOPHAGEAL ECHOCARDIOGRAM (KARMEN) N/A 4/27/2018    Performed by Ward Ricks MD at [facility] CATH LAB    UPPER GASTROINTESTINAL ENDOSCOPY           Family History:  Family History   Problem Relation Age of Onset    Hypertension Mother     Cancer Father 49        stomach CA    Stomach cancer Father 49    Liver cancer Father     Rectal cancer Maternal Grandmother         dx in 70s    Celiac disease Neg Hx     Cirrhosis Neg Hx     Colon cancer Neg Hx     Colon polyps  Neg Hx     Crohn's disease Neg Hx     Cystic fibrosis Neg Hx     Esophageal cancer Neg Hx     Hemochromatosis Neg Hx     Inflammatory bowel disease Neg Hx     Irritable bowel syndrome Neg Hx     Liver disease Neg Hx     Ulcerative colitis Neg Hx     Dain's disease Neg Hx     Lymphoma Neg Hx     Tuberculosis Neg Hx     Scleroderma Neg Hx     Rheum arthritis Neg Hx     Multiple sclerosis Neg Hx     Melanoma Neg Hx     Lupus Neg Hx     Psoriasis Neg Hx     Skin cancer Neg Hx        Social History:  Social History     Tobacco Use    Smoking status: Former Smoker     Packs/day: 0.25     Last attempt to quit: 12/3/2016     Years since quittin.0    Smokeless tobacco: Never Used   Substance and Sexual Activity    Alcohol use: No     Comment: pt states that she is a recoveirng alcoholic, last drink 11    Drug use: No    Sexual activity: N/A        Review of Systems     Review of Systems   Constitutional: Negative for chills and fever.   HENT: Negative for sore throat.    Respiratory: Negative for shortness of breath.    Cardiovascular: Negative for chest pain.   Gastrointestinal: Positive for abdominal pain (upper abd pain radiating to back), nausea and vomiting. Negative for blood in stool, constipation and diarrhea.   Genitourinary: Negative for dysuria, frequency and hematuria.   Musculoskeletal: Negative for back pain.   Skin: Negative for rash.   Neurological: Negative for weakness.   Hematological: Does not bruise/bleed easily.   All other systems reviewed and are negative.     Physical Exam     Initial Vitals [12/15/18 2147]   BP Pulse Resp Temp SpO2   123/65 88 (!) 40 97.9 °F (36.6 °C) 99 %      MAP       --          Physical Exam  Nursing Notes and Vital Signs Reviewed.  Constitutional: Patient is in no acute distress. Well-developed and well-nourished. Extremely anxious. Pt unable to keep anything down.   Head: Atraumatic. Normocephalic.  Eyes: PERRL. EOM intact. Conjunctivae are  "not pale. No scleral icterus.  ENT: Mucous membranes are moist. Oropharynx is clear and symmetric.    Neck: Supple. Full ROM. No lymphadenopathy.  Cardiovascular: Regular rate. Regular rhythm. No murmurs, rubs, or gallops. Distal pulses are 2+ and symmetric.  Pulmonary/Chest: No respiratory distress. Clear to auscultation bilaterally. No wheezing or rales.  Abdominal:  Soft and non-distended.  There is tenderness.  No rebound, guarding, or rigidity.  No organomegaly. No pulsatile mass. Normal bowel sounds. Feeding tube in abd. Known aerophagia. Loaded abd. Pt appears to be belching.   Genitourinary: No CVA tenderness  Musculoskeletal: Moves all extremities. No obvious deformities. No edema. No calf tenderness.  Skin: Warm and dry.  Neurological:  Alert, awake, and appropriate.  Normal speech.  No acute focal neurological deficits are appreciated.  Psychiatric: Normal affect. Good eye contact. Appropriate in content.     ED Course   Procedures  ED Vital Signs:  Vitals:    12/15/18 2147   BP: 123/65   Pulse: 88   Resp: (!) 40   Temp: 97.9 °F (36.6 °C)   TempSrc: Oral   SpO2: 99%   Weight: 73 kg (160 lb 15 oz)   Height: 5' 2" (1.575 m)       Abnormal Lab Results:  Labs Reviewed   LIPASE - Abnormal; Notable for the following components:       Result Value    Lipase 86 (*)     All other components within normal limits   CBC W/ AUTO DIFFERENTIAL   COMPREHENSIVE METABOLIC PANEL   PROTIME-INR   URINALYSIS        All Lab Results:  Results for orders placed or performed during the hospital encounter of 12/15/18   CBC auto differential   Result Value Ref Range    WBC 7.59 3.90 - 12.70 K/uL    RBC 4.71 4.00 - 5.40 M/uL    Hemoglobin 14.0 12.0 - 16.0 g/dL    Hematocrit 41.0 37.0 - 48.5 %    MCV 87 82 - 98 fL    MCH 29.7 27.0 - 31.0 pg    MCHC 34.1 32.0 - 36.0 g/dL    RDW 12.7 11.5 - 14.5 %    Platelets 258 150 - 350 K/uL    MPV 10.8 9.2 - 12.9 fL    Gran # (ANC) 4.6 1.8 - 7.7 K/uL    Lymph # 2.4 1.0 - 4.8 K/uL    Mono # 0.6 0.3 " - 1.0 K/uL    Eos # 0.0 0.0 - 0.5 K/uL    Baso # 0.02 0.00 - 0.20 K/uL    Gran% 59.9 38.0 - 73.0 %    Lymph% 31.4 18.0 - 48.0 %    Mono% 7.9 4.0 - 15.0 %    Eosinophil% 0.5 0.0 - 8.0 %    Basophil% 0.3 0.0 - 1.9 %    Differential Method Automated    Comprehensive metabolic panel   Result Value Ref Range    Sodium 141 136 - 145 mmol/L    Potassium 3.5 3.5 - 5.1 mmol/L    Chloride 100 95 - 110 mmol/L    CO2 29 23 - 29 mmol/L    Glucose 93 70 - 110 mg/dL    BUN, Bld 12 6 - 20 mg/dL    Creatinine 0.9 0.5 - 1.4 mg/dL    Calcium 10.0 8.7 - 10.5 mg/dL    Total Protein 7.3 6.0 - 8.4 g/dL    Albumin 4.5 3.5 - 5.2 g/dL    Total Bilirubin 0.7 0.1 - 1.0 mg/dL    Alkaline Phosphatase 69 55 - 135 U/L    AST 19 10 - 40 U/L    ALT 13 10 - 44 U/L    Anion Gap 12 8 - 16 mmol/L    eGFR if African American >60 >60 mL/min/1.73 m^2    eGFR if non African American >60 >60 mL/min/1.73 m^2   Lipase   Result Value Ref Range    Lipase 86 (H) 4 - 60 U/L   Protime-INR   Result Value Ref Range    Prothrombin Time 10.3 9.0 - 12.5 sec    INR 0.9 0.8 - 1.2            The Emergency Provider reviewed the vital signs and test results, which are outlined above.     ED Discussion     11:18 PM: Reassessed pt at this time.  Pt states her condition has improved at this time. Discussed with pt all pertinent ED information and results. Discussed pt dx and plan of tx. Gave pt all f/u and return to the ED instructions. All questions and concerns were addressed at this time. Pt expresses understanding of information and instructions, and is comfortable with plan to discharge. Pt is stable for discharge.    I discussed with patient and/or family/caretaker that evaluation in the ED does not suggest any emergent or life threatening medical conditions requiring immediate intervention beyond what was provided in the ED, and I believe patient is safe for discharge.  Regardless, an unremarkable evaluation in the ED does not preclude the development or presence of a  serious of life threatening condition. As such, patient was instructed to return immediately for any worsening or change in current symptoms.      ED Medication(s):  Medications   lorazepam (ATIVAN) injection 1 mg (1 mg Intravenous Given 12/15/18 2229)   metoclopramide HCl injection 5 mg (5 mg Intravenous Given 12/15/18 2229)   sodium chloride 0.9% bolus 1,000 mL (0 mLs Intravenous Stopped 12/15/18 2338)   sodium chloride 0.9% bolus 1,000 mL (1,000 mLs Intravenous New Bag 12/15/18 2338)   hydromorphone (PF) injection 1 mg (1 mg Intravenous Given 12/15/18 2338)       Follow-up Information     Akanksha West MD. Call in 2 days.    Specialty:  Internal Medicine  Why:  As  to schedule appt for recheck  Contact information:  [address]  [address]  [facility] [id number] [phone]             Call  Apurva Salinas MD.    Specialty:  Gastroenterology  Why:  As needed to schedule appt otherwise call Dr. Rabago - GI specialist at [place] to schedule appt  Contact information:  [address] [facility] [address]  [phone]                         Medical Decision Making:   Clinical Tests:   Lab Tests: Ordered and Reviewed             Scribe Attestation:   Scribe #1: I performed the above scribed service and the documentation accurately describes the services I performed. I attest to the accuracy of the note.     Attending:   Physician Attestation Statement for Scribe #1: I, Clem Lovell Jr., MD, personally performed the services described in this documentation, as scribed by Irma Villeda, in my presence, and it is both accurate and complete.           Clinical Impression       ICD-10-CM ICD-9-CM   1. Gastroesophageal reflux disease, esophagitis presence not specified K21.9 530.81   2. Digestive disorder due to psychological factors F45.8 306.4   3. Aerophagia F45.8 306.4   4. Gastroparesis K31.84 536.3       Disposition:   Disposition: Discharged  Condition: Stable         Clem Lovell Jr.,  MD  12/16/18 0003

## 2018-12-29 ENCOUNTER — HOSPITAL ENCOUNTER (EMERGENCY)
Facility: HOSPITAL | Age: 38
Discharge: HOME | End: 2018-12-29
Attending: EMERGENCY MEDICINE
Payer: COMMERCIAL

## 2018-12-29 VITALS
HEIGHT: 62 IN | HEART RATE: 94 BPM | BODY MASS INDEX: 29.66 KG/M2 | OXYGEN SATURATION: 99 % | SYSTOLIC BLOOD PRESSURE: 121 MMHG | TEMPERATURE: 98 F | RESPIRATION RATE: 16 BRPM | WEIGHT: 161.19 LBS | DIASTOLIC BLOOD PRESSURE: 64 MMHG

## 2018-12-29 DIAGNOSIS — R11.2 NAUSEA & VOMITING: ICD-10-CM

## 2018-12-29 LAB
ALBUMIN SERPL BCP-MCNC: 3.9 G/DL
ALP SERPL-CCNC: 55 U/L
ALT SERPL W/O P-5'-P-CCNC: 14 U/L
ANION GAP SERPL CALC-SCNC: 8 MMOL/L
AST SERPL-CCNC: 20 U/L
B-HCG UR QL: NEGATIVE
BASOPHILS # BLD AUTO: 0.04 K/UL
BASOPHILS NFR BLD: 0.5 %
BILIRUB SERPL-MCNC: 0.3 MG/DL
BILIRUB UR QL STRIP: NEGATIVE
BUN SERPL-MCNC: 18 MG/DL
CALCIUM SERPL-MCNC: 9.2 MG/DL
CHLORIDE SERPL-SCNC: 109 MMOL/L
CLARITY UR: CLEAR
CO2 SERPL-SCNC: 22 MMOL/L
COLOR UR: YELLOW
CREAT SERPL-MCNC: 0.8 MG/DL
DIFFERENTIAL METHOD: NORMAL
EOSINOPHIL # BLD AUTO: 0.1 K/UL
EOSINOPHIL NFR BLD: 0.6 %
ERYTHROCYTE [DISTWIDTH] IN BLOOD BY AUTOMATED COUNT: 13.5 %
EST. GFR  (AFRICAN AMERICAN): >60 ML/MIN/1.73 M^2
EST. GFR  (NON AFRICAN AMERICAN): >60 ML/MIN/1.73 M^2
GLUCOSE SERPL-MCNC: 95 MG/DL
GLUCOSE UR QL STRIP: NEGATIVE
HCT VFR BLD AUTO: 40 %
HGB BLD-MCNC: 13.5 G/DL
HGB UR QL STRIP: NEGATIVE
KETONES UR QL STRIP: NEGATIVE
LEUKOCYTE ESTERASE UR QL STRIP: NEGATIVE
LIPASE SERPL-CCNC: 28 U/L
LYMPHOCYTES # BLD AUTO: 1.7 K/UL
LYMPHOCYTES NFR BLD: 21.5 %
MCH RBC QN AUTO: 29.3 PG
MCHC RBC AUTO-ENTMCNC: 33.8 G/DL
MCV RBC AUTO: 87 FL
MONOCYTES # BLD AUTO: 0.7 K/UL
MONOCYTES NFR BLD: 8.3 %
NEUTROPHILS # BLD AUTO: 5.5 K/UL
NEUTROPHILS NFR BLD: 69.1 %
NITRITE UR QL STRIP: NEGATIVE
PH UR STRIP: >8 [PH] (ref 5–8)
PLATELET # BLD AUTO: 205 K/UL
PMV BLD AUTO: 10.9 FL
POTASSIUM SERPL-SCNC: 4.4 MMOL/L
PROT SERPL-MCNC: 6.7 G/DL
PROT UR QL STRIP: NEGATIVE
RBC # BLD AUTO: 4.61 M/UL
SODIUM SERPL-SCNC: 139 MMOL/L
SP GR UR STRIP: 1.01 (ref 1–1.03)
URN SPEC COLLECT METH UR: ABNORMAL
UROBILINOGEN UR STRIP-ACNC: NEGATIVE EU/DL
WBC # BLD AUTO: 7.95 K/UL

## 2018-12-29 PROCEDURE — 80053 COMPREHEN METABOLIC PANEL: CPT

## 2018-12-29 PROCEDURE — 81025 URINE PREGNANCY TEST: CPT

## 2018-12-29 PROCEDURE — 85025 COMPLETE CBC W/AUTO DIFF WBC: CPT

## 2018-12-29 PROCEDURE — 81003 URINALYSIS AUTO W/O SCOPE: CPT

## 2018-12-29 PROCEDURE — 63600175 PHARM REV CODE 636 W HCPCS: Performed by: EMERGENCY MEDICINE

## 2018-12-29 PROCEDURE — 36415 COLL VENOUS BLD VENIPUNCTURE: CPT

## 2018-12-29 PROCEDURE — C9113 INJ PANTOPRAZOLE SODIUM, VIA: HCPCS | Performed by: EMERGENCY MEDICINE

## 2018-12-29 PROCEDURE — 83690 ASSAY OF LIPASE: CPT

## 2018-12-29 PROCEDURE — 99284 EMERGENCY DEPT VISIT MOD MDM: CPT | Mod: 25

## 2018-12-29 PROCEDURE — 25000003 PHARM REV CODE 250: Performed by: EMERGENCY MEDICINE

## 2018-12-29 RX ORDER — ESOMEPRAZOLE MAGNESIUM 40 MG/1
40 CAPSULE, DELAYED RELEASE ORAL
Qty: 60 CAPSULE | Refills: 6 | Status: SHIPPED | OUTPATIENT
Start: 2018-12-29 | End: 2021-06-04

## 2018-12-29 RX ORDER — PANTOPRAZOLE SODIUM 40 MG/10ML
40 INJECTION, POWDER, LYOPHILIZED, FOR SOLUTION INTRAVENOUS
Status: COMPLETED | OUTPATIENT
Start: 2018-12-29 | End: 2018-12-29

## 2018-12-29 RX ORDER — MORPHINE SULFATE 10 MG/ML
4 INJECTION INTRAMUSCULAR; INTRAVENOUS; SUBCUTANEOUS
Status: COMPLETED | OUTPATIENT
Start: 2018-12-29 | End: 2018-12-29

## 2018-12-29 RX ORDER — METOCLOPRAMIDE HYDROCHLORIDE 5 MG/ML
10 INJECTION INTRAMUSCULAR; INTRAVENOUS
Status: COMPLETED | OUTPATIENT
Start: 2018-12-29 | End: 2018-12-29

## 2018-12-29 RX ADMIN — SODIUM CHLORIDE 1000 ML: 0.9 INJECTION, SOLUTION INTRAVENOUS at 10:12

## 2018-12-29 RX ADMIN — METOCLOPRAMIDE 10 MG: 5 INJECTION, SOLUTION INTRAMUSCULAR; INTRAVENOUS at 10:12

## 2018-12-29 RX ADMIN — PANTOPRAZOLE SODIUM 40 MG: 40 INJECTION, POWDER, FOR SOLUTION INTRAVENOUS at 10:12

## 2018-12-29 RX ADMIN — DICYCLOMINE HYDROCHLORIDE 50 ML: 10 SOLUTION ORAL at 10:12

## 2018-12-29 RX ADMIN — MORPHINE SULFATE 4 MG: 10 INJECTION INTRAVENOUS at 10:12

## 2018-12-29 NOTE — ED PROVIDER NOTES
SCRIBE #1 NOTE: I, Corinne Mack, am scribing for, and in the presence of, Oscar Guerra MD. I have scribed the entire note.      History      Chief Complaint   Patient presents with    Emesis     x 1 month, recently hospitalized at [facility] for dehydration, pt has peg tube and hx of SBO.       Review of patient's allergies indicates:   Allergen Reactions    Sulfa (sulfonamide antibiotics)         HPI   HPI    12/29/2018, 7:47 AM   History obtained from the patient      History of Present Illness: Lia Avila is a 38 y.o. female patient with PMHx of anxiety, IBS, and sleep apnea who presents to the Emergency Department for emesis which onset gradually a few weeks ago. Pt reports recent hospitalization at [facility] to tx dehydration. Symptoms are episodic and moderate in severity. No mitigating or exacerbating factors reported. Associated sxs include nausea. Patient denies any fever, chills, CP, SOB, diarrhea, abd pain, back pain, dysuria, hematuria, HA, dizziness, and all other sxs at this time. No prior Tx reported. No further complaints or concerns at this time.         Arrival mode: Personal vehicle    PCP: Akanksha West MD       Past Medical History:  Past Medical History:   Diagnosis Date    Acid reflux     Aerophagia     Alcoholic     recovering    Anxiety     Depression     Functional gastrointestinal disorder     IBS (irritable bowel syndrome)     Irritable bowel syndrome     Sleep apnea     Trivial aortic valve anomaly        Past Surgical History:  Past Surgical History:   Procedure Laterality Date    BACK SURGERY      fusion of L4, L5, and S1    CHEST TUBE PLACEMENT Left 1/16/2017    Performed by Louis O. Jeansonne IV, [facility] at [facility] OR    CHOLECYSTECTOMY      COLON SURGERY      COLONOSCOPY      Colonoscopy N/A 4/13/2018    Performed by Apurva Salinas MD at [facility] ENDO (4TH FLR)    ESOPHAGOGASTRODUODENOSCOPY (EGD) N/A 4/13/2018    Performed by Apurva Salinas MD at [facility] ENDO (4TH  FLR)    ESOPHAGOGASTRODUODENOSCOPY (EGD) N/A 3/28/2017    Performed by Pop Schwab MD at [facility] ENDO    GASTROSTOMY-JEJEUNOSTOMY TUBE CHANGE/PLACEMENT      LAPAROSCOPY-DIAGNOSTIC N/A 1/3/2017    Performed by Louis [facility]. Jeansonne IV, MD at [facility] OR    ULAKA-ZSIVOKMP-GIZJOOQBTVJF N/A 1/3/2017    Performed by Louis O. Jeansonne IV, MD at [facility] OR    MANOMETRY, ESOPHAGUS, WITH IMPEDANCE MEASUREMENT N/A 10/8/2018    Performed by [facility]inas MD at [facility] ENDO (4TH FLR)    MANOMETRY-ANORECTAL N/A 4/3/2018    Performed by Apurva Salinas MD at [facility] ENDO (4TH FLR)    RESECTION - SMALL BOWEL N/A 1/3/2017    Performed by Louis O. Jeansonne IV, MD at [facility] OR    SMALL INTESTINE SURGERY      TRANSESOPHAGEAL ECHOCARDIOGRAM (KARMEN) N/A 2018    Performed by Ward Ricks MD at [facility] CATH LAB    UPPER GASTROINTESTINAL ENDOSCOPY           Family History:  Family History   Problem Relation Age of Onset    Hypertension Mother     Cancer Father 49        stomach CA    Stomach cancer Father 49    Liver cancer Father     Rectal cancer Maternal Grandmother         dx in 70s    Celiac disease Neg Hx     Cirrhosis Neg Hx     Colon cancer Neg Hx     Colon polyps Neg Hx     Crohn's disease Neg Hx     Cystic fibrosis Neg Hx     Esophageal cancer Neg Hx     Hemochromatosis Neg Hx     Inflammatory bowel disease Neg Hx     Irritable bowel syndrome Neg Hx     Liver disease Neg Hx     Ulcerative colitis Neg Hx     Dain's disease Neg Hx     Lymphoma Neg Hx     Tuberculosis Neg Hx     Scleroderma Neg Hx     Rheum arthritis Neg Hx     Multiple sclerosis Neg Hx     Melanoma Neg Hx     Lupus Neg Hx     Psoriasis Neg Hx     Skin cancer Neg Hx        Social History:  Social History     Tobacco Use    Smoking status: Former Smoker     Packs/day: 0.25     Last attempt to quit: 12/3/2016     Years since quittin.0    Smokeless tobacco: Never Used   Substance and Sexual Activity    Alcohol use: No     Comment: pt  states that she is a recoveirng alcoholic, last drink 7-2-11    Drug use: No    Sexual activity: Not on file       ROS   Review of Systems   Constitutional: Negative for chills and fever.   Respiratory: Negative for cough and shortness of breath.    Cardiovascular: Negative for chest pain and leg swelling.   Gastrointestinal: Positive for nausea and vomiting. Negative for abdominal pain and diarrhea.   Genitourinary: Negative for dysuria, flank pain and hematuria.   Musculoskeletal: Negative for back pain, neck pain and neck stiffness.   Skin: Negative for rash and wound.   Neurological: Negative for dizziness, light-headedness, numbness and headaches.   All other systems reviewed and are negative.    Physical Exam      Initial Vitals [12/29/18 0720]   BP Pulse Resp Temp SpO2   121/64 94 16 98.2 °F (36.8 °C) 99 %      MAP       --          Physical Exam  Nursing Notes and Vital Signs Reviewed.  Constitutional: Patient is in no acute distress. Well-developed and well-nourished.  Head: Atraumatic. Normocephalic.  Eyes: PERRL. EOM intact. Conjunctivae are not pale. No scleral icterus.  ENT: Mucous membranes are moist. Oropharynx is clear and symmetric.    Neck: Supple. Full ROM. No lymphadenopathy.  Cardiovascular: Regular rate. Regular rhythm. No murmurs, rubs, or gallops. Distal pulses are 2+ and symmetric.  Pulmonary/Chest: No respiratory distress. Clear to auscultation bilaterally. No wheezing or rales.  Abdominal: Soft and non-distended.  There is no tenderness.  No rebound, guarding, or rigidity. G tube in place. Multiple well-healed scars from prior surgeries. Easily reducible ventral hernia.   Musculoskeletal: Moves all extremities. No obvious deformities. No edema.   Skin: Warm and dry.  Neurological:  Alert, awake, and appropriate.  Normal speech.  No acute focal neurological deficits are appreciated.  Psychiatric: Normal affect. Good eye contact. Appropriate in content.    ED Course    Procedures  ED Vital  "Signs:  Vitals:    12/29/18 0720   BP: 121/64   Pulse: 94   Resp: 16   Temp: 98.2 °F (36.8 °C)   TempSrc: Oral   SpO2: 99%   Weight: 73.1 kg (161 lb 2.5 oz)   Height: 5' 2" (1.575 m)       Abnormal Lab Results:  Labs Reviewed   COMPREHENSIVE METABOLIC PANEL - Abnormal; Notable for the following components:       Result Value    CO2 22 (*)     All other components within normal limits   URINALYSIS, REFLEX TO URINE CULTURE - Abnormal; Notable for the following components:    pH, UA >8.0 (*)     All other components within normal limits    Narrative:     Preferred Collection Type->Urine, Clean Catch   CBC W/ AUTO DIFFERENTIAL   LIPASE   PREGNANCY TEST, URINE RAPID        All Lab Results:  Results for orders placed or performed during the hospital encounter of 12/29/18   CBC auto differential   Result Value Ref Range    WBC 7.95 3.90 - 12.70 K/uL    RBC 4.61 4.00 - 5.40 M/uL    Hemoglobin 13.5 12.0 - 16.0 g/dL    Hematocrit 40.0 37.0 - 48.5 %    MCV 87 82 - 98 fL    MCH 29.3 27.0 - 31.0 pg    MCHC 33.8 32.0 - 36.0 g/dL    RDW 13.5 11.5 - 14.5 %    Platelets 205 150 - 350 K/uL    MPV 10.9 9.2 - 12.9 fL    Gran # (ANC) 5.5 1.8 - 7.7 K/uL    Lymph # 1.7 1.0 - 4.8 K/uL    Mono # 0.7 0.3 - 1.0 K/uL    Eos # 0.1 0.0 - 0.5 K/uL    Baso # 0.04 0.00 - 0.20 K/uL    Gran% 69.1 38.0 - 73.0 %    Lymph% 21.5 18.0 - 48.0 %    Mono% 8.3 4.0 - 15.0 %    Eosinophil% 0.6 0.0 - 8.0 %    Basophil% 0.5 0.0 - 1.9 %    Differential Method Automated    Comprehensive metabolic panel   Result Value Ref Range    Sodium 139 136 - 145 mmol/L    Potassium 4.4 3.5 - 5.1 mmol/L    Chloride 109 95 - 110 mmol/L    CO2 22 (L) 23 - 29 mmol/L    Glucose 95 70 - 110 mg/dL    BUN, Bld 18 6 - 20 mg/dL    Creatinine 0.8 0.5 - 1.4 mg/dL    Calcium 9.2 8.7 - 10.5 mg/dL    Total Protein 6.7 6.0 - 8.4 g/dL    Albumin 3.9 3.5 - 5.2 g/dL    Total Bilirubin 0.3 0.1 - 1.0 mg/dL    Alkaline Phosphatase 55 55 - 135 U/L    AST 20 10 - 40 U/L    ALT 14 10 - 44 U/L    Anion " Gap 8 8 - 16 mmol/L    eGFR if African American >60 >60 mL/min/1.73 m^2    eGFR if non African American >60 >60 mL/min/1.73 m^2   Urinalysis, Reflex to Urine Culture Urine, Clean Catch   Result Value Ref Range    Specimen UA Urine, Clean Catch     Color, UA Yellow Yellow, Straw, Leola    Appearance, UA Clear Clear    pH, UA >8.0 (A) 5.0 - 8.0    Specific Gravity, UA 1.010 1.005 - 1.030    Protein, UA Negative Negative    Glucose, UA Negative Negative    Ketones, UA Negative Negative    Bilirubin (UA) Negative Negative    Occult Blood UA Negative Negative    Nitrite, UA Negative Negative    Urobilinogen, UA Negative <2.0 EU/dL    Leukocytes, UA Negative Negative   Lipase   Result Value Ref Range    Lipase 28 4 - 60 U/L   Pregnancy, urine rapid   Result Value Ref Range    Preg Test, Ur Negative          Imaging Results:  Imaging Results          X-Ray Abdomen Flat And Erect (Final result)  Result time 12/29/18 10:21:55    Final result by Flako Cordova MD (12/29/18 10:21:55)                 Impression:      No evidence for small bowel obstruction.      Electronically signed by: Flako Cordova MD  Date:    12/29/2018  Time:    10:21             Narrative:    EXAMINATION:  XR ABDOMEN FLAT AND ERECT    CLINICAL HISTORY:  Nausea with vomiting, unspecified    TECHNIQUE:  Flat and erect AP views of the abdomen were performed.    COMPARISON:  Abdominal x-ray, 11/30/2018.    FINDINGS:  There is a percutaneous gastrostomy tube in place.  Cholecystectomy clips are noted.  Lower lumbar spinal fusion hardware noted.  No free air in the peritoneum.  No dilated small bowel loops.  Mild to moderate stool retention.                                        The Emergency Provider reviewed the vital signs and test results, which are outlined above.    ED Discussion     10:36 AM: Reassessed pt at this time. Pt is awake, alert, and in no distress. Discussed with pt all pertinent ED information and results. Discussed pt dx and plan of tx.  Gave pt all f/u and return to the ED instructions. All questions and concerns were addressed at this time. Pt expresses understanding of information and instructions, and is comfortable with plan to discharge. Pt is stable for discharge.    I discussed with patient and/or family/caretaker that evaluation in the ED does not suggest any emergent or life threatening medical conditions requiring immediate intervention beyond what was provided in the ED, and I believe patient is safe for discharge.  Regardless, an unremarkable evaluation in the ED does not preclude the development or presence of a serious of life threatening condition. As such, patient was instructed to return immediately for any worsening or change in current symptoms.      ED Medication(s):  Medications   sodium chloride 0.9% bolus 1,000 mL (1,000 mLs Intravenous New Bag 12/29/18 1008)   morphine injection 4 mg (4 mg Intravenous Given 12/29/18 1007)   metoclopramide HCl injection 10 mg (10 mg Intravenous Given 12/29/18 1009)   GI cocktail (mylanta 30 mL, lidocaine 2 % viscous 10 mL, dicyclomine 10 mL) 50 mL (50 mLs Oral Given 12/29/18 1054)   pantoprazole injection 40 mg (40 mg Intravenous Given 12/29/18 1054)          Medication List      CONTINUE taking these medications    esomeprazole 40 MG capsule  Commonly known as:  NEXIUM  Take 1 capsule (40 mg total) by mouth 2 (two) times daily before meals.        ASK your doctor about these medications    atorvastatin 20 MG tablet  Commonly known as:  LIPITOR     dextroamphetamine-amphetamine 20 mg tablet  Commonly known as:  ADDERALL     diazePAM 10 MG Tab  Commonly known as:  VALIUM     DULoxetine 60 MG capsule  Commonly known as:  CYMBALTA     ergocalciferol (vitamin D2) 400 unit Tab     ferrous gluconate 324 MG tablet  Commonly known as:  FERGON     * HYDROcodone-acetaminophen  mg per tablet  Commonly known as:  NORCO     * HYDROcodone-acetaminophen 5-325 mg per tablet  Commonly known as:   NORCO  Take 1 tablet by mouth every 4 (four) hours as needed.     LINZESS 290 mcg Cap  Generic drug:  linaclotide     metFORMIN 500 MG tablet  Commonly known as:  GLUCOPHAGE     ondansetron 4 MG Tbdl  Commonly known as:  ZOFRAN-ODT  Take 2 tablets (8 mg total) by mouth every 8 (eight) hours as needed.     prochlorperazine 10 MG tablet  Commonly known as:  COMPAZINE     promethazine 25 MG tablet  Commonly known as:  PHENERGAN  Take 1 tablet (25 mg total) by mouth every 6 (six) hours as needed.     quetiapine 50 mg oral tb24 50 mg Tb24  Commonly known as:  SEROQUEL XR     scopolamine 1.3-1.5 mg (1 mg over 3 days)  Commonly known as:  TRANSDERM-SCOP  Place 1 patch onto the skin every 72 hours.     traZODone 150 MG tablet  Commonly known as:  DESYREL     TRULANCE 3 mg Tab  Generic drug:  plecanatide  Take 1 tablet (3 mg) by mouth once daily.     VITAMIN D ORAL         * This list has 2 medication(s) that are the same as other medications prescribed for you. Read the directions carefully, and ask your doctor or other care provider to review them with you.               Where to Get Your Medications      These medications were sent to [id number] - [place], [place] - [address] AT [facility] [address]   [address] [facility] [address]    Phone:  [phone]   · esomeprazole 40 MG capsule         Follow-up Information     Akanksha West MD.    Specialty:  Internal Medicine  Contact information:  [address]  [address]  [facility] [address] [phone]                     Medical Decision Making    Medical Decision Making:   Clinical Tests:   Lab Tests: Ordered and Reviewed  Radiological Study: Ordered and Reviewed           Scribe Attestation:   Scribe #1: I performed the above scribed service and the documentation accurately describes the services I performed. I attest to the accuracy of the note 12/29/2018 10:36 AM.    Attending:   Physician Attestation Statement  for Scribe #1: I, Oscar Guerra MD, personally performed the services described in this documentation, as scribed by Corinne Mack, in my presence, and it is both accurate and complete.          Clinical Impression       ICD-10-CM ICD-9-CM   1. Nausea & vomiting R11.2 787.01       Disposition:   Disposition: Discharged  Condition: Stable           Oscar Guerra MD  12/29/18 1145

## 2019-01-05 ENCOUNTER — HOSPITAL ENCOUNTER (EMERGENCY)
Facility: HOSPITAL | Age: 39
Discharge: HOME | End: 2019-01-06
Attending: EMERGENCY MEDICINE
Payer: COMMERCIAL

## 2019-01-05 DIAGNOSIS — K56.7 ILEUS: Primary | ICD-10-CM

## 2019-01-05 DIAGNOSIS — R10.9 ABDOMINAL PAIN, UNSPECIFIED ABDOMINAL LOCATION: ICD-10-CM

## 2019-01-05 PROCEDURE — 85730 THROMBOPLASTIN TIME PARTIAL: CPT

## 2019-01-05 PROCEDURE — 85025 COMPLETE CBC W/AUTO DIFF WBC: CPT

## 2019-01-05 PROCEDURE — 96375 TX/PRO/DX INJ NEW DRUG ADDON: CPT

## 2019-01-05 PROCEDURE — C9113 INJ PANTOPRAZOLE SODIUM, VIA: HCPCS | Performed by: EMERGENCY MEDICINE

## 2019-01-05 PROCEDURE — 85610 PROTHROMBIN TIME: CPT

## 2019-01-05 PROCEDURE — 80053 COMPREHEN METABOLIC PANEL: CPT

## 2019-01-05 PROCEDURE — 99285 EMERGENCY DEPT VISIT HI MDM: CPT | Mod: 25

## 2019-01-05 PROCEDURE — 96374 THER/PROPH/DIAG INJ IV PUSH: CPT

## 2019-01-05 PROCEDURE — 63600175 PHARM REV CODE 636 W HCPCS: Performed by: EMERGENCY MEDICINE

## 2019-01-05 RX ORDER — PANTOPRAZOLE SODIUM 40 MG/10ML
40 INJECTION, POWDER, LYOPHILIZED, FOR SOLUTION INTRAVENOUS
Status: COMPLETED | OUTPATIENT
Start: 2019-01-05 | End: 2019-01-05

## 2019-01-05 RX ORDER — HYDROMORPHONE HYDROCHLORIDE 2 MG/ML
0.5 INJECTION, SOLUTION INTRAMUSCULAR; INTRAVENOUS; SUBCUTANEOUS
Status: COMPLETED | OUTPATIENT
Start: 2019-01-05 | End: 2019-01-05

## 2019-01-05 RX ORDER — METOCLOPRAMIDE HYDROCHLORIDE 5 MG/ML
5 INJECTION INTRAMUSCULAR; INTRAVENOUS
Status: COMPLETED | OUTPATIENT
Start: 2019-01-05 | End: 2019-01-05

## 2019-01-05 RX ADMIN — PANTOPRAZOLE SODIUM 40 MG: 40 INJECTION, POWDER, LYOPHILIZED, FOR SOLUTION INTRAVENOUS at 11:01

## 2019-01-05 RX ADMIN — HYDROMORPHONE HYDROCHLORIDE 0.5 MG: 2 INJECTION INTRAMUSCULAR; INTRAVENOUS; SUBCUTANEOUS at 11:01

## 2019-01-05 RX ADMIN — METOCLOPRAMIDE 5 MG: 5 INJECTION, SOLUTION INTRAMUSCULAR; INTRAVENOUS at 11:01

## 2019-01-06 VITALS
WEIGHT: 162.94 LBS | SYSTOLIC BLOOD PRESSURE: 103 MMHG | HEIGHT: 62 IN | TEMPERATURE: 98 F | DIASTOLIC BLOOD PRESSURE: 63 MMHG | OXYGEN SATURATION: 99 % | BODY MASS INDEX: 29.98 KG/M2 | HEART RATE: 80 BPM | RESPIRATION RATE: 16 BRPM

## 2019-01-06 LAB
ALBUMIN SERPL BCP-MCNC: 4.2 G/DL
ALP SERPL-CCNC: 64 U/L
ALT SERPL W/O P-5'-P-CCNC: 17 U/L
ANION GAP SERPL CALC-SCNC: 10 MMOL/L
APTT BLDCRRT: 29.8 SEC
AST SERPL-CCNC: 17 U/L
BASOPHILS # BLD AUTO: 0.03 K/UL
BASOPHILS NFR BLD: 0.5 %
BILIRUB SERPL-MCNC: 0.4 MG/DL
BUN SERPL-MCNC: 15 MG/DL
CALCIUM SERPL-MCNC: 9.3 MG/DL
CHLORIDE SERPL-SCNC: 108 MMOL/L
CO2 SERPL-SCNC: 21 MMOL/L
CREAT SERPL-MCNC: 1 MG/DL
DIFFERENTIAL METHOD: NORMAL
EOSINOPHIL # BLD AUTO: 0.1 K/UL
EOSINOPHIL NFR BLD: 0.8 %
ERYTHROCYTE [DISTWIDTH] IN BLOOD BY AUTOMATED COUNT: 13.6 %
EST. GFR  (AFRICAN AMERICAN): >60 ML/MIN/1.73 M^2
EST. GFR  (NON AFRICAN AMERICAN): >60 ML/MIN/1.73 M^2
GLUCOSE SERPL-MCNC: 76 MG/DL
HCT VFR BLD AUTO: 41.6 %
HGB BLD-MCNC: 14 G/DL
INR PPP: 0.9
LYMPHOCYTES # BLD AUTO: 2.1 K/UL
LYMPHOCYTES NFR BLD: 31.5 %
MCH RBC QN AUTO: 29.2 PG
MCHC RBC AUTO-ENTMCNC: 33.7 G/DL
MCV RBC AUTO: 87 FL
MONOCYTES # BLD AUTO: 0.6 K/UL
MONOCYTES NFR BLD: 8.4 %
NEUTROPHILS # BLD AUTO: 3.9 K/UL
NEUTROPHILS NFR BLD: 58.8 %
PLATELET # BLD AUTO: 227 K/UL
PMV BLD AUTO: 10.9 FL
POTASSIUM SERPL-SCNC: 3.9 MMOL/L
PROT SERPL-MCNC: 7.2 G/DL
PROTHROMBIN TIME: 9.6 SEC
RBC # BLD AUTO: 4.79 M/UL
SODIUM SERPL-SCNC: 139 MMOL/L
WBC # BLD AUTO: 6.66 K/UL

## 2019-01-06 PROCEDURE — 25500020 PHARM REV CODE 255: Performed by: EMERGENCY MEDICINE

## 2019-01-06 RX ADMIN — IOHEXOL 30 ML: 350 INJECTION, SOLUTION INTRAVENOUS at 12:01

## 2019-01-06 RX ADMIN — IOHEXOL 75 ML: 350 INJECTION, SOLUTION INTRAVENOUS at 01:01

## 2019-01-06 NOTE — ED NOTES
Per request of patient and with permission from Dr. Lovell, pt's G-tube hooked up to suction to aspirate contents of stomach prior to pushing contrast into G-tube. Contrast then manually pushed with syringe into G-tube. CT tech made aware and states will come to do CT in approximately one hour.

## 2019-01-06 NOTE — ED NOTES
"Pt. Resting quietly on stretcher in NAD, VSS, resp. E/u. AAOx4. Pt. Request to hook G-tube to suction to suction out contrast liquid, stating that she is uncomfortable due to fullness. After hooking G-tube to suction, nothing aspirated out. Pt. States, "The research says that gastroparetics empty liquids correctly, so maybe it emptied." Pt. Denies further needs. Awaiting CT results for dispo. WCTM.  "

## 2019-01-06 NOTE — ED PROVIDER NOTES
SCRIBE #1 NOTE: I, Allyson Styles, am scribing for, and in the presence of, Clem Lovell Jr., MD. I have scribed the HPI, ROS, and PEx.     SCRIBE #2 NOTE: I, Melany Lee, am scribing for, and in the presence of,  Anil Seymour MD. I have scribed the remaining portions of the note not scribed by Scribe #1.     History      Chief Complaint   Patient presents with    Abdominal Pain     pt has hx of bowel obstructions and  gtube. pt reports her last BM was 12/19 and she is starting to feel that pain again       Review of patient's allergies indicates:   Allergen Reactions    Sulfa (sulfonamide antibiotics)         HPI   HPI    1/5/2019, 10:37 PM   History obtained from the patient      History of Present Illness: Lia Avila is a 38 y.o. female patient with a PMHx of GERD, Aerophagia, IBS, PSHx of Cholecystectomy, Small Bowel Resection, Colon Surgery, G tube placement who presents to the Emergency Department for abd pain which onset gradually a few days PTA. She reports her pain feels similar to past bowel obstructions. Symptoms are constant and moderate in severity. No mitigating or exacerbating factors reported. Associated sxs include constipation. Last BM was on 12/19/18. Pt has not vented her G tube today. Patient denies any fever, chills, n/v/d, dysuria, hematuria, frequency, and all other sxs at this time. Pt's care is followed by Dr. Salinas (GI). Pt is well known by me and often presents to this ED, she has multiple GI issues. No further complaints or concerns at this time.       Arrival mode: Personal vehicle     PCP: Akanksha West MD       Past Medical History:  Past Medical History:   Diagnosis Date    Acid reflux     Aerophagia     Alcoholic     recovering    Anxiety     Depression     Functional gastrointestinal disorder     IBS (irritable bowel syndrome)     Irritable bowel syndrome     Sleep apnea     Trivial aortic valve anomaly        Past Surgical History:  Past Surgical  History:   Procedure Laterality Date    BACK SURGERY      fusion of L4, L5, and S1    CHEST TUBE PLACEMENT Left 1/16/2017    Performed by Louis O. Jeansonne IV, MD at [facility] OR    CHOLECYSTECTOMY      COLON SURGERY      COLONOSCOPY      Colonoscopy N/A 4/13/2018    Performed by Apurva Salinas MD at [facility] ENDO (4TH FLR)    ESOPHAGOGASTRODUODENOSCOPY (EGD) N/A 4/13/2018    Performed by Apurva Salinas MD at [facility] ENDO (4TH FLR)    ESOPHAGOGASTRODUODENOSCOPY (EGD) N/A 3/28/2017    Performed by Pop Schwab MD at [facility] ENDO    GASTROSTOMY-JEJEUNOSTOMY TUBE CHANGE/PLACEMENT      LAPAROSCOPY-DIAGNOSTIC N/A 1/3/2017    Performed by Louis O. Jeansonne IV, MD at [facility] OR    [id number] N/A 1/3/2017    Performed by Louis O. Jeansonne IV, MD at [facility] OR    MANOMETRY, ESOPHAGUS, WITH IMPEDANCE MEASUREMENT N/A 10/8/2018    Performed by Apurva Salinas MD at [facility] ENDO (4TH FLR)    MANOMETRY-ANORECTAL N/A 4/3/2018    Performed by Apurva Salinas MD at [facility] ENDO (4TH FLR)    RESECTION - SMALL BOWEL N/A 1/3/2017    Performed by Louis O. Jeansonne IV, MD at [facility] OR    SMALL INTESTINE SURGERY      TRANSESOPHAGEAL ECHOCARDIOGRAM (KARMEN) N/A 4/27/2018    Performed by Ward Ricks MD at [facility] CATH LAB    UPPER GASTROINTESTINAL ENDOSCOPY           Family History:  Family History   Problem Relation Age of Onset    Hypertension Mother     Cancer Father 49        stomach CA    Stomach cancer Father 49    Liver cancer Father     Rectal cancer Maternal Grandmother         dx in 70s    Celiac disease Neg Hx     Cirrhosis Neg Hx     Colon cancer Neg Hx     Colon polyps Neg Hx     Crohn's disease Neg Hx     Cystic fibrosis Neg Hx     Esophageal cancer Neg Hx     Hemochromatosis Neg Hx     Inflammatory bowel disease Neg Hx     Irritable bowel syndrome Neg Hx     Liver disease Neg Hx     Ulcerative colitis Neg Hx     Dain's disease Neg Hx     Lymphoma Neg Hx     Tuberculosis Neg Hx     Scleroderma  Neg Hx     Rheum arthritis Neg Hx     Multiple sclerosis Neg Hx     Melanoma Neg Hx     Lupus Neg Hx     Psoriasis Neg Hx     Skin cancer Neg Hx        Social History:  Social History     Tobacco Use    Smoking status: Former Smoker     Packs/day: 0.25     Last attempt to quit: 12/3/2016     Years since quittin.0    Smokeless tobacco: Never Used   Substance and Sexual Activity    Alcohol use: No     Comment: pt states that she is a recoveirng alcoholic, last drink 11    Drug use: No    Sexual activity: unknown       ROS   Review of Systems   Constitutional: Negative for chills and fever.   HENT: Negative for congestion and sore throat.    Respiratory: Negative for shortness of breath.    Cardiovascular: Negative for chest pain.   Gastrointestinal: Positive for abdominal pain and constipation. Negative for blood in stool, diarrhea, nausea and vomiting.   Genitourinary: Negative for dysuria, frequency and hematuria.   Musculoskeletal: Negative for myalgias.   Skin: Negative for rash.   Neurological: Negative for weakness and numbness.   Hematological: Does not bruise/bleed easily.   All other systems reviewed and are negative.      Physical Exam      Initial Vitals [19 2234]   BP Pulse Resp Temp SpO2   132/70 97 19 98 °F (36.7 °C) 99 %      MAP       --          Physical Exam  Nursing Notes and Vital Signs Reviewed.  Constitutional: Patient is in no acute distress. Well-developed and well-nourished.  Head: Atraumatic. Normocephalic.  Eyes: PERRL. EOM intact. Conjunctivae are not pale. No scleral icterus.  ENT: Mucous membranes are moist. Oropharynx is clear and symmetric.    Neck: Supple. Full ROM. No lymphadenopathy.  Cardiovascular: Regular rate. Regular rhythm. No murmurs, rubs, or gallops. Distal pulses are 2+ and symmetric.  Pulmonary/Chest: No respiratory distress. Clear to auscultation bilaterally. No wheezing or rales.  Abdominal: Distended but soft. Generalized tenderness. No rebound,  "guarding, or rigidity. Good bowel sounds. G tube in place.   Rectal:  No tenderness.  No masses.  No hemorrhoids.  Normal sphincter tone. No stool in the rectal vault.  Genitourinary: No CVA tenderness  Musculoskeletal: Moves all extremities. No obvious deformities. No edema. No calf tenderness.  Skin: Warm and dry.  Neurological:  Alert, awake, and appropriate.  Normal speech.  No acute focal neurological deficits are appreciated.  Psychiatric: Normal affect. Good eye contact. Appropriate in content.    ED Course    Procedures  ED Vital Signs:  Vitals:    01/05/19 2234 01/06/19 0000 01/06/19 0032 01/06/19 0224   BP: 132/70 (!) 110/51 (!) 105/59 (!) 103/49   Pulse: 97 77 75 80   Resp: 19 16 16 16   Temp: 98 °F (36.7 °C)      TempSrc: Oral      SpO2: 99% 99% 100% 99%   Weight: 73.9 kg (162 lb 14.7 oz)      Height: 5' 2" (1.575 m)       01/06/19 0306   BP: 103/63   Pulse: 80   Resp: 16   Temp:    TempSrc:    SpO2: 99%   Weight:    Height:        Abnormal Lab Results:  Labs Reviewed   COMPREHENSIVE METABOLIC PANEL - Abnormal; Notable for the following components:       Result Value    CO2 21 (*)     All other components within normal limits   CBC W/ AUTO DIFFERENTIAL   PROTIME-INR   APTT        All Lab Results:  Results for orders placed or performed during the hospital encounter of 01/05/19   CBC auto differential   Result Value Ref Range    WBC 6.66 3.90 - 12.70 K/uL    RBC 4.79 4.00 - 5.40 M/uL    Hemoglobin 14.0 12.0 - 16.0 g/dL    Hematocrit 41.6 37.0 - 48.5 %    MCV 87 82 - 98 fL    MCH 29.2 27.0 - 31.0 pg    MCHC 33.7 32.0 - 36.0 g/dL    RDW 13.6 11.5 - 14.5 %    Platelets 227 150 - 350 K/uL    MPV 10.9 9.2 - 12.9 fL    Gran # (ANC) 3.9 1.8 - 7.7 K/uL    Lymph # 2.1 1.0 - 4.8 K/uL    Mono # 0.6 0.3 - 1.0 K/uL    Eos # 0.1 0.0 - 0.5 K/uL    Baso # 0.03 0.00 - 0.20 K/uL    Gran% 58.8 38.0 - 73.0 %    Lymph% 31.5 18.0 - 48.0 %    Mono% 8.4 4.0 - 15.0 %    Eosinophil% 0.8 0.0 - 8.0 %    Basophil% 0.5 0.0 - 1.9 %    " Differential Method Automated    Comprehensive metabolic panel   Result Value Ref Range    Sodium 139 136 - 145 mmol/L    Potassium 3.9 3.5 - 5.1 mmol/L    Chloride 108 95 - 110 mmol/L    CO2 21 (L) 23 - 29 mmol/L    Glucose 76 70 - 110 mg/dL    BUN, Bld 15 6 - 20 mg/dL    Creatinine 1.0 0.5 - 1.4 mg/dL    Calcium 9.3 8.7 - 10.5 mg/dL    Total Protein 7.2 6.0 - 8.4 g/dL    Albumin 4.2 3.5 - 5.2 g/dL    Total Bilirubin 0.4 0.1 - 1.0 mg/dL    Alkaline Phosphatase 64 55 - 135 U/L    AST 17 10 - 40 U/L    ALT 17 10 - 44 U/L    Anion Gap 10 8 - 16 mmol/L    eGFR if African American >60 >60 mL/min/1.73 m^2    eGFR if non African American >60 >60 mL/min/1.73 m^2   Protime-INR   Result Value Ref Range    Prothrombin Time 9.6 9.0 - 12.5 sec    INR 0.9 0.8 - 1.2   APTT   Result Value Ref Range    aPTT 29.8 21.0 - 32.0 sec         Imaging Results:  Imaging Results          CT Abdomen Pelvis With Contrast (In process)                Per STAT radiology, pt's CT results  1. Moderate colonic stool which may be ileus/constipation.  2. No mechanical bowel obstruction.   3. No diverticulitis.           The Emergency Provider reviewed the vital signs and test results, which are outlined above.    ED Discussion     1:34 AM: Dr. Lovell transfers care of pt to Dr. Seymour, pending lab/imaging results.    2:36 AM: Dr. Seymour assessed pt at this time.  Pt states her condition has improved at this time. Discussed with pt all pertinent ED information and results. Discussed pt dx and plan of tx. Gave pt all f/u and return to the ED instructions. All questions and concerns were addressed at this time. Pt expresses understanding of information and instructions, and is comfortable with plan to discharge. Pt is stable for discharge.     I discussed with patient and/or family/caretaker that evaluation in the ED does not suggest any emergent or life threatening medical conditions requiring immediate intervention beyond what was provided in the ED,  and I believe patient is safe for discharge.  Regardless, an unremarkable evaluation in the ED does not preclude the development or presence of a serious of life threatening condition. As such, patient was instructed to return immediately for any worsening or change in current symptoms.        ED Medication(s):  Medications   hydromorphone (PF) injection 0.5 mg (0.5 mg Intravenous Given 1/5/19 7762)   metoclopramide HCl injection 5 mg (5 mg Intravenous Given 1/5/19 2347)   pantoprazole injection 40 mg (40 mg Intravenous Given 1/5/19 2343)   omnipaque 350 iohexol 30 mL (30 mLs Per NG tube Given 1/6/19 0008)   omnipaque 350 iohexol 75 mL (75 mLs Intravenous Given 1/6/19 0155)     Current Discharge Medication List   none         Follow-up Information     Akanksha West MD In 1 week.    Specialty:  Internal Medicine  Contact information:  [address]  [address]  [facility] [address] [phone]                     Medical Decision Making    Medical Decision Making:   Clinical Tests:   Lab Tests: Ordered and Reviewed  Radiological Study: Ordered and Reviewed           Scribe Attestation:   Scribe #1: I performed the above scribed service and the documentation accurately describes the services I performed. I attest to the accuracy of the note.    Attending:   Physician Attestation Statement for Scribe #1: I, Clem Lovell Jr., MD, personally performed the services described in this documentation, as scribed by Allyson Styles, in my presence, and it is both accurate and complete.       Scribe Attestation:   Scribe #2: I performed the above scribed service and the documentation accurately describes the services I performed. I attest to the accuracy of the note.    Attending Attestation:           Physician Attestation for Scribe:    Physician Attestation Statement for Scribe #2: I, Anil Seymour MD, reviewed documentation, as scribed by Melany Lee in my presence, and it is both accurate and complete. I also  acknowledge and confirm the content of the note done by Scribe #1.          Clinical Impression       ICD-10-CM ICD-9-CM   1. Ileus K56.7 560.1   2. Abdominal pain, unspecified abdominal location R10.9 789.00       Disposition:   Disposition: Discharged  Condition: Stable         Anil Seymour MD  01/06/19 0616

## 2019-01-09 ENCOUNTER — TELEPHONE (OUTPATIENT)
Dept: GASTROENTEROLOGY | Facility: CLINIC | Age: 39
End: 2019-01-09

## 2019-01-09 NOTE — TELEPHONE ENCOUNTER
----- Message from Pari Holliday sent at 1/9/2019  9:19 AM CST -----  Contact: pt  Pt returning nurse call, please call pt @ [phone].

## 2019-01-17 ENCOUNTER — HOSPITAL ENCOUNTER (EMERGENCY)
Facility: HOSPITAL | Age: 39
Discharge: HOME | End: 2019-01-17
Attending: EMERGENCY MEDICINE
Payer: COMMERCIAL

## 2019-01-17 VITALS
TEMPERATURE: 98 F | RESPIRATION RATE: 20 BRPM | SYSTOLIC BLOOD PRESSURE: 132 MMHG | OXYGEN SATURATION: 99 % | WEIGHT: 157.5 LBS | HEART RATE: 82 BPM | DIASTOLIC BLOOD PRESSURE: 72 MMHG | BODY MASS INDEX: 28.98 KG/M2 | HEIGHT: 62 IN

## 2019-01-17 DIAGNOSIS — R30.0 DYSURIA: Primary | ICD-10-CM

## 2019-01-17 DIAGNOSIS — Z87.898 HISTORY OF URINARY RETENTION: ICD-10-CM

## 2019-01-17 LAB
ALBUMIN SERPL BCP-MCNC: 4.5 G/DL
ALP SERPL-CCNC: 61 U/L
ALT SERPL W/O P-5'-P-CCNC: 12 U/L
ANION GAP SERPL CALC-SCNC: 11 MMOL/L
AST SERPL-CCNC: 15 U/L
BASOPHILS # BLD AUTO: 0.02 K/UL
BASOPHILS NFR BLD: 0.3 %
BILIRUB SERPL-MCNC: 0.6 MG/DL
BILIRUB UR QL STRIP: NEGATIVE
BUN SERPL-MCNC: 14 MG/DL
CALCIUM SERPL-MCNC: 9.8 MG/DL
CHLORIDE SERPL-SCNC: 105 MMOL/L
CLARITY UR: CLEAR
CO2 SERPL-SCNC: 24 MMOL/L
COLOR UR: YELLOW
CREAT SERPL-MCNC: 0.9 MG/DL
DIFFERENTIAL METHOD: NORMAL
EOSINOPHIL # BLD AUTO: 0.1 K/UL
EOSINOPHIL NFR BLD: 0.9 %
ERYTHROCYTE [DISTWIDTH] IN BLOOD BY AUTOMATED COUNT: 13.5 %
EST. GFR  (AFRICAN AMERICAN): >60 ML/MIN/1.73 M^2
EST. GFR  (NON AFRICAN AMERICAN): >60 ML/MIN/1.73 M^2
GLUCOSE SERPL-MCNC: 91 MG/DL
GLUCOSE UR QL STRIP: NEGATIVE
HCT VFR BLD AUTO: 40.4 %
HGB BLD-MCNC: 13.9 G/DL
HGB UR QL STRIP: ABNORMAL
KETONES UR QL STRIP: ABNORMAL
LEUKOCYTE ESTERASE UR QL STRIP: NEGATIVE
LYMPHOCYTES # BLD AUTO: 2 K/UL
LYMPHOCYTES NFR BLD: 29.2 %
MCH RBC QN AUTO: 29.4 PG
MCHC RBC AUTO-ENTMCNC: 34.4 G/DL
MCV RBC AUTO: 86 FL
MONOCYTES # BLD AUTO: 0.6 K/UL
MONOCYTES NFR BLD: 8.2 %
NEUTROPHILS # BLD AUTO: 4.1 K/UL
NEUTROPHILS NFR BLD: 61.4 %
NITRITE UR QL STRIP: NEGATIVE
PH UR STRIP: 7 [PH] (ref 5–8)
PLATELET # BLD AUTO: 254 K/UL
PMV BLD AUTO: 11.2 FL
POTASSIUM SERPL-SCNC: 3.3 MMOL/L
PROT SERPL-MCNC: 7.4 G/DL
PROT UR QL STRIP: NEGATIVE
RBC # BLD AUTO: 4.72 M/UL
SODIUM SERPL-SCNC: 140 MMOL/L
SP GR UR STRIP: <=1.005 (ref 1–1.03)
URN SPEC COLLECT METH UR: ABNORMAL
UROBILINOGEN UR STRIP-ACNC: 1 EU/DL
WBC # BLD AUTO: 6.74 K/UL

## 2019-01-17 PROCEDURE — 99284 EMERGENCY DEPT VISIT MOD MDM: CPT | Mod: 25

## 2019-01-17 PROCEDURE — 81003 URINALYSIS AUTO W/O SCOPE: CPT

## 2019-01-17 PROCEDURE — 96375 TX/PRO/DX INJ NEW DRUG ADDON: CPT

## 2019-01-17 PROCEDURE — 96374 THER/PROPH/DIAG INJ IV PUSH: CPT

## 2019-01-17 PROCEDURE — 25000003 PHARM REV CODE 250: Performed by: PHYSICIAN ASSISTANT

## 2019-01-17 PROCEDURE — 96361 HYDRATE IV INFUSION ADD-ON: CPT

## 2019-01-17 PROCEDURE — 63600175 PHARM REV CODE 636 W HCPCS: Performed by: PHYSICIAN ASSISTANT

## 2019-01-17 PROCEDURE — 80053 COMPREHEN METABOLIC PANEL: CPT

## 2019-01-17 PROCEDURE — 36415 COLL VENOUS BLD VENIPUNCTURE: CPT

## 2019-01-17 PROCEDURE — 85025 COMPLETE CBC W/AUTO DIFF WBC: CPT

## 2019-01-17 RX ORDER — METOCLOPRAMIDE HYDROCHLORIDE 5 MG/ML
10 INJECTION INTRAMUSCULAR; INTRAVENOUS
Status: COMPLETED | OUTPATIENT
Start: 2019-01-17 | End: 2019-01-17

## 2019-01-17 RX ORDER — MORPHINE SULFATE 10 MG/ML
4 INJECTION INTRAMUSCULAR; INTRAVENOUS; SUBCUTANEOUS
Status: COMPLETED | OUTPATIENT
Start: 2019-01-17 | End: 2019-01-17

## 2019-01-17 RX ORDER — METOCLOPRAMIDE 10 MG/1
10 TABLET ORAL EVERY 6 HOURS
Qty: 30 TABLET | Refills: 0 | Status: SHIPPED | OUTPATIENT
Start: 2019-01-17 | End: 2021-06-04

## 2019-01-17 RX ORDER — HYDROCODONE BITARTRATE AND ACETAMINOPHEN 10; 325 MG/1; MG/1
1 TABLET ORAL EVERY 4 HOURS PRN
Qty: 12 TABLET | Refills: 0 | Status: SHIPPED | OUTPATIENT
Start: 2019-01-17 | End: 2021-06-04

## 2019-01-17 RX ADMIN — METOCLOPRAMIDE 10 MG: 5 INJECTION, SOLUTION INTRAMUSCULAR; INTRAVENOUS at 05:01

## 2019-01-17 RX ADMIN — SODIUM CHLORIDE 1000 ML: 0.9 INJECTION, SOLUTION INTRAVENOUS at 05:01

## 2019-01-17 RX ADMIN — MORPHINE SULFATE 4 MG: 10 INJECTION INTRAVENOUS at 06:01

## 2019-01-17 NOTE — ED PROVIDER NOTES
"SCRIBE #1 NOTE: I, Lia Ramsay, am scribing for, and in the presence of, LUZ ELENA Meadows. I have scribed the entire note.      History      Chief Complaint   Patient presents with    Dysuria     Pt states, "I haven't urinated for 2 days and I think I am dehydrated."       Review of patient's allergies indicates:   Allergen Reactions    Sulfa (sulfonamide antibiotics)         HPI   HPI    1/17/2019, 5:19 PM   History obtained from the patient      History of Present Illness: Lia Avila is a 38 y.o. female patient who presents to the Emergency Department for evaluation of urinary retention which onset 2 days ago. Symptoms are constant and moderate in severity. Pt reports having an appointment with Dr. Schwab (Gatroenteriology) next week. Pt believes she is dehydrated. No mitigating or exacerbating factors reported. Patient denies any fever, chills, CP, SOB, abd pain, diarrhea, back pain, hematuria, HA, dizziness, and all other sxs at this time. Prior Tx includes Reglan and Zofran. No further complaints or concerns at this time.         Arrival mode:Personal vehicle      PCP: Akanksha West MD       Past Medical History:  Past Medical History:   Diagnosis Date    Acid reflux     Aerophagia     Alcoholic     recovering    Anxiety     Depression     Functional gastrointestinal disorder     IBS (irritable bowel syndrome)     Irritable bowel syndrome     Sleep apnea     Trivial aortic valve anomaly        Past Surgical History:  Past Surgical History:   Procedure Laterality Date    BACK SURGERY      fusion of L4, L5, and S1    CHEST TUBE PLACEMENT Left 1/16/2017    Performed by [facility] at [facility] OR    CHOLECYSTECTOMY      COLON SURGERY      COLONOSCOPY      Colonoscopy N/A 4/13/2018    Performed by [facility] at [facility] END[facility] (4[facility] FLR)    ESOPHAGOGASTRODUODENOSCOPY (EGD) N/A 4/13/2018    Performed by [facility]inas MD at [facility] ENDO (4TH FLR)    " ESOPHAGOGASTRODUODENOSCOPY (EGD) N/A 3/28/2017    Performed by Pop Schwab MD at [facility] ENDO    GASTROSTOMY-JEJEUNOSTOMY TUBE CHANGE/PLACEMENT      LAPAROSCOPY-DIAGNOSTIC N/A 1/3/2017    Performed by Louis O. [facility]ansonne IV, MD at [facility] OR    [id number] N/A 1/3/2017    Performed by Louis [facility]. [facility]ne IV, MD at [facility] OR    MANOMETRY, ESOPHAGUS, WITH IMPEDANCE MEASUREMENT N/A 10/8/2018    Performed by [facility] at [facility] ENDO (4TH FLR)    MANOMETRY-ANORECTAL N/A 4/3/2018    Performed by [facility] MD at [facility] ENDO (4TH FLR)    RESECTION - SMALL BOWEL N/A 1/3/2017    Performed by Louis O. Jeansonne IV, MD at [facility] OR    SMALL INTESTINE SURGERY      TRANSESOPHAGEAL ECHOCARDIOGRAM (KARMEN) N/A 2018    Performed by Ward Ricks MD at [facility] CATH LAB    UPPER GASTROINTESTINAL ENDOSCOPY           Family History:  Family History   Problem Relation Age of Onset    Hypertension Mother     Cancer Father 49        stomach CA    Stomach cancer Father 49    Liver cancer Father     Rectal cancer Maternal Grandmother         dx in 70s    Celiac disease Neg Hx     Cirrhosis Neg Hx     Colon cancer Neg Hx     Colon polyps Neg Hx     Crohn's disease Neg Hx     Cystic fibrosis Neg Hx     Esophageal cancer Neg Hx     Hemochromatosis Neg Hx     Inflammatory bowel disease Neg Hx     Irritable bowel syndrome Neg Hx     Liver disease Neg Hx     Ulcerative colitis Neg Hx     Dain's disease Neg Hx     Lymphoma Neg Hx     Tuberculosis Neg Hx     Scleroderma Neg Hx     Rheum arthritis Neg Hx     Multiple sclerosis Neg Hx     Melanoma Neg Hx     Lupus Neg Hx     Psoriasis Neg Hx     Skin cancer Neg Hx        Social History:  Social History     Tobacco Use    Smoking status: Former Smoker     Packs/day: 0.25     Last attempt to quit: 12/3/2016     Years since quittin.1    Smokeless tobacco: Never Used   Substance and Sexual Activity    Alcohol use: No     Comment: pt states that  she is a recoveirng alcoholic, last drink 7-2-11    Drug use: No    Sexual activity: Not given       ROS   Review of Systems   Constitutional: Negative for chills and fever.   HENT: Negative for sore throat.    Respiratory: Negative for shortness of breath.    Cardiovascular: Negative for chest pain.   Gastrointestinal: Negative for abdominal pain, diarrhea, nausea and vomiting.   Genitourinary: Positive for difficulty urinating. Negative for dysuria, flank pain, pelvic pain, vaginal bleeding, vaginal discharge and vaginal pain.   Musculoskeletal: Negative for back pain and neck pain.   Skin: Negative for rash.   Neurological: Negative for dizziness, syncope, weakness, numbness and headaches.   Hematological: Does not bruise/bleed easily.   All other systems reviewed and are negative.    Physical Exam      Initial Vitals [01/17/19 1652]   BP Pulse Resp Temp SpO2   137/69 84 20 98.1 °F (36.7 °C) 99 %      MAP       --          Physical Exam  Nursing Notes and Vital Signs Reviewed.  Constitutional: Patient is in no acute distress. Well-developed and well-nourished.  Head: Atraumatic. Normocephalic.  Eyes: PERRL. EOM intact. Conjunctivae are not pale. No scleral icterus.  ENT: Mucous membranes are moist. Oropharynx is clear and symmetric.    Neck: Supple. Full ROM. No lymphadenopathy.  Cardiovascular: Regular rate. Regular rhythm. No murmurs, rubs, or gallops. Distal pulses are 2+ and symmetric.  Pulmonary/Chest: No respiratory distress. Clear to auscultation bilaterally. No wheezing or rales.  Abdominal: Soft and non-distended.  There is no tenderness.  No rebound, guarding, or rigidity. Good bowel sounds.  Genitourinary: No CVA tenderness  Musculoskeletal: Moves all extremities. No obvious deformities. No edema. No calf tenderness.  Skin: Warm and dry.  Neurological:  Alert, awake, and appropriate.  Normal speech.  No acute focal neurological deficits are appreciated.  Psychiatric: Normal affect. Good eye contact.  "Appropriate in content.    ED Course    Procedures  ED Vital Signs:  Vitals:    01/17/19 1652   BP: 137/69   Pulse: 84   Resp: 20   Temp: 98.1 °F (36.7 °C)   TempSrc: Oral   SpO2: 99%   Weight: 71.5 kg (157 lb 8.3 oz)   Height: 5' 2" (1.575 m)       Abnormal Lab Results:  Labs Reviewed   COMPREHENSIVE METABOLIC PANEL - Abnormal; Notable for the following components:       Result Value    Potassium 3.3 (*)     All other components within normal limits   URINALYSIS - Abnormal; Notable for the following components:    Specific Gravity, UA <=1.005 (*)     Ketones, UA Trace (*)     Occult Blood UA Trace (*)     All other components within normal limits   CBC W/ AUTO DIFFERENTIAL        All Lab Results:  Results for orders placed or performed during the hospital encounter of 01/17/19   CBC auto differential   Result Value Ref Range    WBC 6.74 3.90 - 12.70 K/uL    RBC 4.72 4.00 - 5.40 M/uL    Hemoglobin 13.9 12.0 - 16.0 g/dL    Hematocrit 40.4 37.0 - 48.5 %    MCV 86 82 - 98 fL    MCH 29.4 27.0 - 31.0 pg    MCHC 34.4 32.0 - 36.0 g/dL    RDW 13.5 11.5 - 14.5 %    Platelets 254 150 - 350 K/uL    MPV 11.2 9.2 - 12.9 fL    Gran # (ANC) 4.1 1.8 - 7.7 K/uL    Lymph # 2.0 1.0 - 4.8 K/uL    Mono # 0.6 0.3 - 1.0 K/uL    Eos # 0.1 0.0 - 0.5 K/uL    Baso # 0.02 0.00 - 0.20 K/uL    Gran% 61.4 38.0 - 73.0 %    Lymph% 29.2 18.0 - 48.0 %    Mono% 8.2 4.0 - 15.0 %    Eosinophil% 0.9 0.0 - 8.0 %    Basophil% 0.3 0.0 - 1.9 %    Differential Method Automated    Comprehensive metabolic panel   Result Value Ref Range    Sodium 140 136 - 145 mmol/L    Potassium 3.3 (L) 3.5 - 5.1 mmol/L    Chloride 105 95 - 110 mmol/L    CO2 24 23 - 29 mmol/L    Glucose 91 70 - 110 mg/dL    BUN, Bld 14 6 - 20 mg/dL    Creatinine 0.9 0.5 - 1.4 mg/dL    Calcium 9.8 8.7 - 10.5 mg/dL    Total Protein 7.4 6.0 - 8.4 g/dL    Albumin 4.5 3.5 - 5.2 g/dL    Total Bilirubin 0.6 0.1 - 1.0 mg/dL    Alkaline Phosphatase 61 55 - 135 U/L    AST 15 10 - 40 U/L    ALT 12 10 - 44 " U/L    Anion Gap 11 8 - 16 mmol/L    eGFR if African American >60 >60 mL/min/1.73 m^2    eGFR if non African American >60 >60 mL/min/1.73 m^2   Urinalysis   Result Value Ref Range    Specimen UA Urine, Clean Catch     Color, UA Yellow Yellow, Straw, [place]    Appearance, UA Clear Clear    pH, UA 7.0 5.0 - 8.0    Specific Gravity, UA <=1.005 (A) 1.005 - 1.030    Protein, UA Negative Negative    Glucose, UA Negative Negative    Ketones, UA Trace (A) Negative    Bilirubin (UA) Negative Negative    Occult Blood UA Trace (A) Negative    Nitrite, UA Negative Negative    Urobilinogen, UA 1.0 <2.0 EU/dL    Leukocytes, UA Negative Negative              The Emergency Provider reviewed the vital signs and test results, which are outlined above.    ED Discussion     6:41 PM Reassessed pt at this time.  Pt is awake, alert, and in NAD at this time. Discussed with pt all pertinent ED information and results. Discussed pt dx and plan of tx. Gave pt all f/u and return to the ED instructions. All questions and concerns were addressed at this time. Pt expresses understanding of information and instructions, and is comfortable with plan to discharge. Pt is stable for discharge.      ED Medication(s):  Medications   sodium chloride 0.9% bolus 1,000 mL (1,000 mLs Intravenous New Bag 1/17/19 1735)   sodium chloride 0.9% bolus 1,000 mL (1,000 mLs Intravenous New Bag 1/17/19 1735)   metoclopramide HCl injection 10 mg (10 mg Intravenous Given 1/17/19 1735)   morphine injection 4 mg (4 mg Intravenous Given 1/17/19 1837)       Follow-up Information     Akanksha Wset MD. Go in 2 days.    Specialty:  Internal Medicine  Contact information:  [address]  [address]  [place] [address] [phone]                     Medical Decision Making    Medical Decision Making:   Clinical Tests:   Lab Tests: Ordered and Reviewed           Scribe Attestation:   Scribe #1: I performed the above scribed service and the documentation accurately  describes the services I performed. I attest to the accuracy of the note.    Attending:   Physician Attestation Statement for Scribe #1: I, LUZ ELENA Meadows, personally performed the services described in this documentation, as scribed by Lia Ramsay, in my presence, and it is both accurate and complete.          Clinical Impression       ICD-10-CM ICD-9-CM   1. Dysuria R30.0 788.1   2. History of urinary retention Z87.898 V13.09       Disposition:   Disposition: Discharged  Condition: Stable         LUZ ELENA Villagomez  01/19/19 1238

## 2019-01-21 ENCOUNTER — PATIENT MESSAGE (OUTPATIENT)
Dept: GASTROENTEROLOGY | Facility: CLINIC | Age: 39
End: 2019-01-21

## 2019-01-21 ENCOUNTER — OFFICE VISIT (OUTPATIENT)
Dept: GASTROENTEROLOGY | Facility: CLINIC | Age: 39
End: 2019-01-21
Payer: COMMERCIAL

## 2019-01-21 ENCOUNTER — TELEPHONE (OUTPATIENT)
Dept: GASTROENTEROLOGY | Facility: CLINIC | Age: 39
End: 2019-01-21

## 2019-01-21 VITALS — HEIGHT: 62 IN | BODY MASS INDEX: 28.69 KG/M2 | WEIGHT: 155.88 LBS

## 2019-01-21 DIAGNOSIS — Z90.49 S/P SMALL BOWEL RESECTION: ICD-10-CM

## 2019-01-21 DIAGNOSIS — K59.09 CHRONIC CONSTIPATION: ICD-10-CM

## 2019-01-21 DIAGNOSIS — K21.9 GASTROESOPHAGEAL REFLUX DISEASE WITHOUT ESOPHAGITIS: ICD-10-CM

## 2019-01-21 DIAGNOSIS — T85.598D FEEDING TUBE DYSFUNCTION, SUBSEQUENT ENCOUNTER: Primary | ICD-10-CM

## 2019-01-21 DIAGNOSIS — F45.8: Chronic | ICD-10-CM

## 2019-01-21 DIAGNOSIS — K58.1 IRRITABLE BOWEL SYNDROME WITH CONSTIPATION: Chronic | ICD-10-CM

## 2019-01-21 PROCEDURE — 99215 PR OFFICE/OUTPT VISIT, EST, LEVL V, 40-54 MIN: ICD-10-PCS | Mod: S$GLB,,, | Performed by: INTERNAL MEDICINE

## 2019-01-21 PROCEDURE — 99215 OFFICE O/P EST HI 40 MIN: CPT | Mod: S$GLB,,, | Performed by: INTERNAL MEDICINE

## 2019-01-21 PROCEDURE — 3008F PR BODY MASS INDEX (BMI) DOCUMENTED: ICD-10-PCS | Mod: CPTII,S$GLB,, | Performed by: INTERNAL MEDICINE

## 2019-01-21 PROCEDURE — 99999 PR PBB SHADOW E&M-EST. PATIENT-LVL IV: ICD-10-PCS | Mod: PBBFAC,,, | Performed by: INTERNAL MEDICINE

## 2019-01-21 PROCEDURE — 3008F BODY MASS INDEX DOCD: CPT | Mod: CPTII,S$GLB,, | Performed by: INTERNAL MEDICINE

## 2019-01-21 PROCEDURE — 99999 PR PBB SHADOW E&M-EST. PATIENT-LVL IV: CPT | Mod: PBBFAC,,, | Performed by: INTERNAL MEDICINE

## 2019-01-21 RX ORDER — ACETAMINOPHEN 500 MG
1 TABLET ORAL DAILY
COMMUNITY

## 2019-01-21 RX ORDER — TOPIRAMATE 100 MG/1
100 TABLET, FILM COATED ORAL 2 TIMES DAILY
COMMUNITY
Start: 2018-11-01 | End: 2019-07-11

## 2019-01-21 NOTE — TELEPHONE ENCOUNTER
Returned call and spoke to an Armando in the referral Department which he gave the Direct fax  number to Dr. Marroquin office [phone] to fax over gastric emptying study which has been done.

## 2019-01-21 NOTE — PATIENT INSTRUCTIONS
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 Stools often become very hard and difficult to pass.  Constipation is very common. At some point in life it affects almost everyone. Since everyone's bowel habits are different, what is constipation to one person may not be to another. Your healthcare provider may do tests to diagnose constipation. It depends on what he or she finds when evaluating you.    Symptoms of constipation include:  · Abdominal pain  · Bloating  · Vomiting  · Painful bowel movements  · Itching, swelling, bleeding, or pain around the anus  Causes  Constipation can have many causes. These include:  · Diet low in fiber  · Too much dairy  · Not drinking enough liquids  · Lack of exercise or physical activity. This is especially true for older adults.  · Changes in lifestyle or daily routine, including pregnancy, aging, work, and travel  · Frequent use or misuse of laxatives  · Ignoring the urge to have a bowel movement or delaying it until later  · Medicines, such as certain prescription pain medicines, iron supplements, antacids, certain antidepressants, and calcium supplements  · Diseases like irritable bowel syndrome, bowel obstructions, stroke, diabetes, thyroid disease, Parkinson disease, hemorrhoids, and colon cancer  Complications  Potential complications of constipation can include:  · Hemorrhoids  · Rectal bleeding from hemorrhoids or anal fissures (skin tears)  · Hernias  · Dependency on laxatives  · Chronic constipation  · Fecal impaction  · Bowel obstruction or perforation  Home care  All treatment should be done after talking with your healthcare provider. This is especially  true if you have another medical problems, are taking prescription medicines, or are an older adult. Treatment most often involves lifestyle changes. You may also need medicines. Your healthcare provider will tell you which will work best for you. Follow the advice below to help avoid this problem in the future.  Lifestyle changes  These lifestyle changes can help prevent constipation:  · Diet. Eat a high-fiber diet, with fresh fruit and vegetables, and reduce dairy intake, meats, and processed foods  · Fluids. It's important to get enough fluids each day. Drink plenty of water when you eat more fiber. If you are on diet that limits the amount of fluid you can have, talk about this with your healthcare provider.  · Regular exercise. Check with your healthcare provider first.  Medications  Take any medicines as directed. Some laxatives are safe to use only every now and then. Others can be taken on a regular basis. Talk with your doctor or pharmacist if you have questions.  Prescription pain medicines can cause constipation. If you are taking this kind of medicine, ask your healthcare provider if you should also take a stool softener.  Medicines you may take to treat constipation include:  · Fiber supplements  · Stool softeners  · Laxatives  · Enemas  · Rectal suppositories  Follow-up care  Follow up with your healthcare provider if symptoms don't get better in the next few days. You may need to have more tests or see a specialist.  Call 911  Call 911 if any of these occur:  · Trouble breathing  · Stiff, rigid abdomen that is severely painful to touch  · Confusion  · Fainting or loss of consciousness  · Rapid heart rate  · Chest pain  When to seek medical advice  Call your healthcare provider right away if any of these occur:  · Fever over 100.4°F (38°C)  · Failure to resume normal bowel movements  · Pain in your abdomen or back gets worse  · Nausea or vomiting  · Swelling in your abdomen  · Blood in the  stool  · Black, tarry stool  · Involuntary weight loss  · Weakness  Date Last Reviewed: 12/30/2015  © 6001-2955 [employer]. [address] [place] [address], [place] [address]. All rights reserved. This information is not intended as a substitute for professional medical care. Always follow your healthcare professional's instructions.

## 2019-01-21 NOTE — PROGRESS NOTES
Subjective:       Patient ID: Lia Avila is a 38 y.o. female.    Chief Complaint: Constipation; Nausea; Abdominal Pain; Rectal Bleeding; Dizziness; and Fatigue    Mrs. Avila comes today looking for answers. She continues to have symptoms of abdominal distention, pain, worsening constipation and nausea. She has seen multiple physicians and motility specialists. Please see below for a summary of her medical problems:      Previous Studies:   GES 10/18/2018: Normal solid gastric emptying.  Esoph manometry 10/8/2018: No [place] Classification abnormality.  Normal LES with compelte relaxation.  Complete bolus clearance.  Weak swallows with incomplete bolus clearance with applesauce and crackers.  No residual liquid in esophagus as the end of the bolus.    CT abd/pelvis 8/14/18:No CT evidence of abscesses in the abdomen or pelvis as clinically questioned. Resolving opacity in the lateral aspect of the left lower lobe, likely scar or atelectasis.  No further follow-up is necessary.Surgical staple line in the mid abdomen, consistent with postoperative changes from prior small bowel resection.Two ventral hernias containing segments of nonobstructed bowel.Postoperative changes from prior posterior spinal fusion of L4-L5 with fusion extending to the sacrum and SI joints.  SIBO 6/19/2018: negative  MRI Pelvis 5/15/2018: Moderate pelvic floor relaxation. Mild cystocele and mild uterine prolapse as above.  Moderate anterior and small posterior rectocele with associated rectal intussusception.  Colon 4/13/2018: GPTTI.  The entire examined colon is normal (r/l-).  Erythematous mucosa in the rectum (-).  Non-bleeding internal hemorrhoids. The examined portion of the ileum was normal.  EGD 4/13/2018: - Normal esophagus (P/D bx-). Z-line regular, 38 cm from the incisors. Normal stomach (mild chronic gastritis and reactive changes, no abnormal clusters of mast cells present).  Gastrostomy present.  Normal examined duodenum  (no celiac,no abnormal clusters of mast cells present).  CT chest 4/12/2018: Persistent minimal areas of scarring noted within the left lung base.  No definite infiltrates or effusions are identified.  CTE 3/26/2018: 1. No abnormal bowel wall enhancement to suggest active inflammatory process.  No bowel obstruction.  No evidence for strictures. 2. Pulmonary opacities as described above, largest 1 measures 0.9 cm.  For a solid nodule >8 mm, Fleischner Society 2017 guidelines recommend considering CT, PET/CT or tissue sampling at 3 months. 3. Interval resolution of previously described pelvic fluid collection. 4. Diastasis of the anterior abdominal wall. Liver measures 19.1 cm, mildly enlarged.  fatty infiltration about the false form ligament. Mild prominence of CBD may be related to cholecystectomy.  .    Anorectal manometry 4/3/2018: Normal resting sphincter. Weak squeeze. Dyssyngergic defecation Type II.  Hypersensitivity in the rectum (pain at 20cc).  Patient was able to evacuate 50cc balloon.   * EGD 3/28/2017: NL esophagus; intact balloon type G tube in gastric body. NL stomach (-). Medium amt food residue in stomach. NL duodenum (-).   *Pelvic us 3/15/2017: NL  *CT abd/pelvis 3/1/2017: trace residual left pleural effusion. enlargement of ovarian cyst. New fluid collection in right adnexa. Nl bowel.  *CT 2/1/2017: sm bilat pleural effusion w/ bibasilar atelectasis right ovarian cyst. g tube. No bowel obstruction  Chest/abd xray 1/30/2017: constipation; gtube. S/p spinal fusion  *CT 1/17/2017: bilat pl effusions, left chest tube. Decrease in size of pelvic fluid colletions  *CT 1/11/2017:bilat effusions. Mild HSM, small free in dougie LUQ. Ext mesenteric fat stranding. Thick walled abscess in rt hemipelvis  *SBFT 1/1/2017: contrast to duodenum and prox jejunum; no progression on delayed images c/w with SBO.  *CT 12/29/2016: dilated SB in upper abd. Collapsed dist SB suspect developing SBO  EKG 3/9/2016: NL  GES  5/25/2015: NL 2.9 % retention at 4 hrs  GI w/SBFT 2/24/2015: NL  Xray KUB 8/25/2014: mod stool in colon. percutaneous feeding tube.   *Colon 7/2/2014: rectal polyp; mild acute colitis. Consider rectal prolapse.  *abd xray 5/12/2014: nonobstructive bowel gas pattern w/ prominent splenic flexure  *UGI w/SBFT 2/13/2014: rapid progression of contrast through bowel w/ no fistula, reflux or stenosis. Air distended stomach and duodenum  *abd xray 12/30/2013: satisfactory placement of GJ tubes w/o extravasation  *GES 7/23/2013: NL t 1/2 64. No significant change since 8/6/2012  CT abd/pelvis 8/22/2012: NL  SBE 4/20/2012: non erosive gastritis (?); normal EGD otherwise. Duodenal bx (?).   *Sitz maker study day 5 4/9/2012: 2 markers in pelvis  *small bowel series 3/29/2012: SB transit time 60 min. IC valve in L mid abd possible malrotation or abn fixation  *colon 3/16/2012: NL colon. NL TI. Redundant colon  *CT 3/7/2012: fluid filled loops of sb w/ air fluid levels suggesting possible ileus   *EGD [id number]: G tube in place. White plaques in duodenum (-)   Colon 3/3/03: EPTC. NL colon small int hemorrhoids.      Constipation   This is a chronic problem. The current episode started more than 1 year ago. The problem is unchanged. The patient is not on a high fiber diet. She does not exercise regularly. There has been adequate water intake. Associated symptoms include abdominal pain, hematochezia and nausea. Pertinent negatives include no back pain, difficulty urinating, fever, melena, rectal pain or vomiting. Risk factors include immobility and stress. Her past medical history is significant for abdominal surgery. There is no history of irritable bowel syndrome.   Nausea   This is a chronic problem. The current episode started more than 1 year ago. The problem occurs intermittently. The problem has been waxing and waning. Associated symptoms include abdominal pain, fatigue, nausea and weakness. Pertinent negatives include no  arthralgias, chills, coughing, diaphoresis, fever, headaches, neck pain, rash, sore throat or vomiting.   Abdominal Pain   This is a chronic problem. The current episode started more than 1 year ago. The problem occurs intermittently. The problem has been waxing and waning. The pain is located in the generalized abdominal region. The pain is at a severity of 7/10. The quality of the pain is cramping. The abdominal pain does not radiate. Associated symptoms include constipation, hematochezia and nausea. Pertinent negatives include no arthralgias, dysuria, fever, headaches, hematuria, melena or vomiting. Nothing aggravates the pain. The pain is relieved by nothing. Her past medical history is significant for abdominal surgery. There is no history of Crohn's disease, GERD or irritable bowel syndrome.   Rectal Bleeding   This is a recurrent problem. The current episode started more than 1 year ago. The problem has been resolved. Associated symptoms include abdominal pain, fatigue, nausea and weakness. Pertinent negatives include no arthralgias, chills, coughing, diaphoresis, fever, headaches, neck pain, rash, sore throat or vomiting.   Dizziness:   Chronicity:  Recurrent  Onset:  More than 1 year ago  Progression since onset:  Waxing and waning  Dizziness characteristics:  Sensation of movement and panic attack   Associated symptoms: nausea and weakness.no ear pain, no fever, no headaches, no vomiting and no diaphoresis.  Fatigue   This is a chronic problem. The current episode started more than 1 year ago. Associated symptoms include abdominal pain, fatigue, nausea and weakness. Pertinent negatives include no arthralgias, chills, coughing, diaphoresis, fever, headaches, neck pain, rash, sore throat or vomiting.     Review of Systems   Constitutional: Positive for activity change, appetite change, fatigue and unexpected weight change. Negative for chills, diaphoresis and fever.   HENT: Negative for ear pain, facial  swelling, nosebleeds, rhinorrhea, sneezing, sore throat and trouble swallowing.    Eyes: Negative for photophobia, pain and visual disturbance.   Respiratory: Negative for apnea, cough, chest tightness, shortness of breath and wheezing.    Gastrointestinal: Positive for abdominal pain, constipation, hematochezia and nausea. Negative for blood in stool, melena, rectal pain and vomiting.   Endocrine: Positive for cold intolerance.   Genitourinary: Negative for decreased urine volume, difficulty urinating, dysuria, flank pain and hematuria.   Musculoskeletal: Negative for arthralgias, back pain, gait problem, neck pain and neck stiffness.   Skin: Negative for pallor and rash.   Neurological: Positive for dizziness and weakness. Negative for seizures, syncope, speech difficulty and headaches.   Hematological: Negative for adenopathy. Does not bruise/bleed easily.   Psychiatric/Behavioral: Positive for agitation, behavioral problems and decreased concentration. Negative for confusion and hallucinations. The patient is nervous/anxious and is hyperactive.        Objective:      Physical Exam   Constitutional: She is oriented to person, place, and time. She appears well-developed and well-nourished.   HENT:   Head: Normocephalic and atraumatic.   Eyes: EOM are normal. Pupils are equal, round, and reactive to light.   Neck: Normal range of motion. Neck supple. No thyromegaly present.   Cardiovascular: Normal rate, regular rhythm, normal heart sounds and intact distal pulses.   No murmur heard.  Pulmonary/Chest: Effort normal and breath sounds normal. No respiratory distress. She has no wheezes. She has no rales.   Abdominal: Soft. Normal appearance and bowel sounds are normal. She exhibits distension. She exhibits no mass. There is no hepatosplenomegaly. There is tenderness.       PEG tube in place. Scars from prior surgeries.    Musculoskeletal: Normal range of motion. She exhibits no edema or tenderness.   Lymphadenopathy:      She has no cervical adenopathy.   Neurological: She is alert and oriented to person, place, and time. She has normal reflexes. No cranial nerve deficit.   Skin: Skin is warm and dry. No rash noted. No erythema.   Psychiatric: She has a normal mood and affect. Her behavior is normal.   Vitals reviewed.      Assessment:       1. Feeding tube dysfunction, subsequent encounter    2. S/P small bowel resection    3. Chronic constipation    4. Gastroesophageal reflux disease without esophagitis    5. Irritable bowel syndrome with constipation    6. 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 I will refer her to [facility] motility disorders clinic. I have filled out a form and sent to them.   We also discussed a surgically placed J-tube vs. Inserting one via the current PEG.         Pop Schwab

## 2019-01-21 NOTE — TELEPHONE ENCOUNTER
----- Message from Usha Leon sent at 1/21/2019 10:05 AM CST -----  Contact: angie martinez-[phone]  Would like to consult with  The nurse concerning a referral that was fax over to them concerning the patients, needs to know what department the patients is going to, please call back [phone] thanks sj

## 2019-02-11 ENCOUNTER — HOSPITAL ENCOUNTER (EMERGENCY)
Facility: HOSPITAL | Age: 39
Discharge: HOME | End: 2019-02-12
Attending: EMERGENCY MEDICINE
Payer: COMMERCIAL

## 2019-02-11 DIAGNOSIS — R10.84 GENERALIZED ABDOMINAL PAIN: Primary | ICD-10-CM

## 2019-02-11 LAB
ALBUMIN SERPL BCP-MCNC: 4 G/DL
ALP SERPL-CCNC: 55 U/L
ALT SERPL W/O P-5'-P-CCNC: 12 U/L
ANION GAP SERPL CALC-SCNC: 9 MMOL/L
AST SERPL-CCNC: 16 U/L
B-HCG UR QL: NEGATIVE
BACTERIA #/AREA URNS HPF: ABNORMAL /HPF
BASOPHILS # BLD AUTO: 0.03 K/UL
BASOPHILS NFR BLD: 0.5 %
BILIRUB SERPL-MCNC: 0.3 MG/DL
BILIRUB UR QL STRIP: NEGATIVE
BUN SERPL-MCNC: 14 MG/DL
CALCIUM SERPL-MCNC: 9.3 MG/DL
CHLORIDE SERPL-SCNC: 109 MMOL/L
CLARITY UR: CLEAR
CO2 SERPL-SCNC: 25 MMOL/L
COLOR UR: YELLOW
CREAT SERPL-MCNC: 0.8 MG/DL
DIFFERENTIAL METHOD: NORMAL
EOSINOPHIL # BLD AUTO: 0.1 K/UL
EOSINOPHIL NFR BLD: 1.7 %
ERYTHROCYTE [DISTWIDTH] IN BLOOD BY AUTOMATED COUNT: 14.3 %
EST. GFR  (AFRICAN AMERICAN): >60 ML/MIN/1.73 M^2
EST. GFR  (NON AFRICAN AMERICAN): >60 ML/MIN/1.73 M^2
GLUCOSE SERPL-MCNC: 64 MG/DL
GLUCOSE UR QL STRIP: NEGATIVE
HCT VFR BLD AUTO: 37.6 %
HGB BLD-MCNC: 12.6 G/DL
HGB UR QL STRIP: ABNORMAL
KETONES UR QL STRIP: NEGATIVE
LEUKOCYTE ESTERASE UR QL STRIP: NEGATIVE
LIPASE SERPL-CCNC: 26 U/L
LYMPHOCYTES # BLD AUTO: 2.6 K/UL
LYMPHOCYTES NFR BLD: 38.9 %
MCH RBC QN AUTO: 29.4 PG
MCHC RBC AUTO-ENTMCNC: 33.5 G/DL
MCV RBC AUTO: 88 FL
MICROSCOPIC COMMENT: ABNORMAL
MONOCYTES # BLD AUTO: 0.7 K/UL
MONOCYTES NFR BLD: 9.9 %
NEUTROPHILS # BLD AUTO: 3.2 K/UL
NEUTROPHILS NFR BLD: 49 %
NITRITE UR QL STRIP: NEGATIVE
PH UR STRIP: 7 [PH] (ref 5–8)
PLATELET # BLD AUTO: 201 K/UL
PMV BLD AUTO: 11.5 FL
POTASSIUM SERPL-SCNC: 3.6 MMOL/L
PROT SERPL-MCNC: 6.5 G/DL
PROT UR QL STRIP: NEGATIVE
RBC # BLD AUTO: 4.29 M/UL
RBC #/AREA URNS HPF: 8 /HPF (ref 0–4)
SODIUM SERPL-SCNC: 143 MMOL/L
SP GR UR STRIP: <=1.005 (ref 1–1.03)
SQUAMOUS #/AREA URNS HPF: 1 /HPF
URN SPEC COLLECT METH UR: ABNORMAL
UROBILINOGEN UR STRIP-ACNC: NEGATIVE EU/DL
WBC # BLD AUTO: 6.56 K/UL

## 2019-02-11 PROCEDURE — 81025 URINE PREGNANCY TEST: CPT

## 2019-02-11 PROCEDURE — 63600175 PHARM REV CODE 636 W HCPCS: Performed by: EMERGENCY MEDICINE

## 2019-02-11 PROCEDURE — 96361 HYDRATE IV INFUSION ADD-ON: CPT

## 2019-02-11 PROCEDURE — 85025 COMPLETE CBC W/AUTO DIFF WBC: CPT

## 2019-02-11 PROCEDURE — 80053 COMPREHEN METABOLIC PANEL: CPT

## 2019-02-11 PROCEDURE — 82962 GLUCOSE BLOOD TEST: CPT

## 2019-02-11 PROCEDURE — 25000003 PHARM REV CODE 250: Performed by: EMERGENCY MEDICINE

## 2019-02-11 PROCEDURE — 25500020 PHARM REV CODE 255: Performed by: EMERGENCY MEDICINE

## 2019-02-11 PROCEDURE — 36415 COLL VENOUS BLD VENIPUNCTURE: CPT

## 2019-02-11 PROCEDURE — 99285 EMERGENCY DEPT VISIT HI MDM: CPT | Mod: 25

## 2019-02-11 PROCEDURE — 83690 ASSAY OF LIPASE: CPT

## 2019-02-11 PROCEDURE — 96374 THER/PROPH/DIAG INJ IV PUSH: CPT

## 2019-02-11 PROCEDURE — 96375 TX/PRO/DX INJ NEW DRUG ADDON: CPT

## 2019-02-11 PROCEDURE — 81000 URINALYSIS NONAUTO W/SCOPE: CPT

## 2019-02-11 RX ORDER — HYDROMORPHONE HYDROCHLORIDE 2 MG/ML
1 INJECTION, SOLUTION INTRAMUSCULAR; INTRAVENOUS; SUBCUTANEOUS
Status: COMPLETED | OUTPATIENT
Start: 2019-02-11 | End: 2019-02-11

## 2019-02-11 RX ORDER — ONDANSETRON 2 MG/ML
4 INJECTION INTRAMUSCULAR; INTRAVENOUS
Status: COMPLETED | OUTPATIENT
Start: 2019-02-11 | End: 2019-02-11

## 2019-02-11 RX ADMIN — IOHEXOL 75 ML: 350 INJECTION, SOLUTION INTRAVENOUS at 11:02

## 2019-02-11 RX ADMIN — ONDANSETRON 4 MG: 2 INJECTION INTRAMUSCULAR; INTRAVENOUS at 10:02

## 2019-02-11 RX ADMIN — HYDROMORPHONE HYDROCHLORIDE 1 MG: 2 INJECTION, SOLUTION INTRAMUSCULAR; INTRAVENOUS; SUBCUTANEOUS at 10:02

## 2019-02-11 RX ADMIN — SODIUM CHLORIDE 1000 ML: 0.9 INJECTION, SOLUTION INTRAVENOUS at 10:02

## 2019-02-12 VITALS
SYSTOLIC BLOOD PRESSURE: 116 MMHG | BODY MASS INDEX: 28.61 KG/M2 | TEMPERATURE: 98 F | WEIGHT: 155.44 LBS | HEART RATE: 76 BPM | OXYGEN SATURATION: 98 % | RESPIRATION RATE: 15 BRPM | HEIGHT: 62 IN | DIASTOLIC BLOOD PRESSURE: 73 MMHG

## 2019-02-12 LAB — POCT GLUCOSE: 98 MG/DL (ref 70–110)

## 2019-02-12 PROCEDURE — C9113 INJ PANTOPRAZOLE SODIUM, VIA: HCPCS | Performed by: EMERGENCY MEDICINE

## 2019-02-12 PROCEDURE — 63600175 PHARM REV CODE 636 W HCPCS: Performed by: EMERGENCY MEDICINE

## 2019-02-12 RX ORDER — PANTOPRAZOLE SODIUM 40 MG/10ML
40 INJECTION, POWDER, LYOPHILIZED, FOR SOLUTION INTRAVENOUS
Status: COMPLETED | OUTPATIENT
Start: 2019-02-12 | End: 2019-02-12

## 2019-02-12 RX ADMIN — PANTOPRAZOLE SODIUM 40 MG: 40 INJECTION, POWDER, LYOPHILIZED, FOR SOLUTION INTRAVENOUS at 01:02

## 2019-02-12 NOTE — ED PROVIDER NOTES
SCRIBE #1 NOTE: I, Shazia Flores, am scribing for, and in the presence of, Vivian Coyle Do, MD. I have scribed the entire note.         History     Chief Complaint   Patient presents with    Abdominal Pain     Pt on TF at home for 16 days due to gastroperesis. States increase in abd swelling and pain - Dr West told pt to come in for abd CT. HX of bowel obstruction       Review of patient's allergies indicates:   Allergen Reactions    Sulfa (sulfonamide antibiotics)          History of Present Illness   HPI    2/11/2019, 9:42 PM  History obtained from the patient      History of Present Illness: Lia Avila is a 38 y.o. female patient with PMHx of DM, functional gastrointestinal disorder, IBS, and bowel obstruction who presents to the Emergency Department for generalized abdominal pain which onset gradually a few days ago, worse today after being evaluated by home health. Patient states she has been having peritoneal feeds through G-tube for the last 16 days due to gastroparesis. Patient states her PCP told her to come to ED for CT to f/o bowel obstruction. Symptoms are constant and moderate in severity. No mitigating or exacerbating factors reported. Associated sxs include abdominal distention. Patient states her last BM was 2 weeks ago which is normal for her. Patient states sometimes she goes a month without a BM. Patient denies any fever, chills, dysuria, hematuria, hematochezia, constipation, N/V/D, and all other sxs at this time. Patient states she last saw Dr. Schwab (GI) on 1/21/19 and was referred to [facility]. Patient states she is currently on her menstrual cycle. No further complaints or concerns at this time.     Arrival mode: Personal vehicle      PCP: Akanksha West MD        Past Medical History:  Past Medical History:   Diagnosis Date    Acid reflux     Aerophagia     Alcoholic     recovering    Anxiety     Depression     Functional gastrointestinal disorder     IBS  (irritable bowel syndrome)     Irritable bowel syndrome     Sleep apnea     Trivial aortic valve anomaly        Past Surgical History:  Past Surgical History:   Procedure Laterality Date    BACK SURGERY      fusion of L4, L5, and S1    CHEST TUBE PLACEMENT Left 1/16/2017    Performed by Louis O. Jeansonne IV, MD at [facility] OR    CHOLECYSTECTOMY      COLON SURGERY      COLONOSCOPY      Colonoscopy N/A 4/13/2018    Performed by Apurva Salinas MD at [facility] ENDO (4TH FLR)    ESOPHAGOGASTRODUODENOSCOPY (EGD) N/A 4/13/2018    Performed by Apurva Salinas MD at [facility] ENDO (4TH FLR)    ESOPHAGOGASTRODUODENOSCOPY (EGD) N/A 3/28/2017    Performed by Pop Schwab MD at [facility] ENDO    GASTROSTOMY-JEJEUNOSTOMY TUBE CHANGE/PLACEMENT      LAPAROSCOPY-DIAGNOSTIC N/A 1/3/2017    Performed by Louis O[facility] Jeansonne IV, MD at [facility] OR    CBLJC-DTRCQBIW-IKYLQSTFJHUD N/A 1/3/2017    Performed by Louis O. Jeansonne IV, MD at [facility] OR    MANOMETRY, ESOPHAGUS, WITH IMPEDANCE MEASUREMENT N/A 10/8/2018    Performed by Apurva Salinas MD at [facility] ENDO (4TH FLR)    MANOMETRY-ANORECTAL N/A 4/3/2018    Performed by Apurva Salinas MD at [facility] ENDO (4TH FLR)    RESECTION - SMALL BOWEL N/A 1/3/2017    Performed by Louis O. Jeansonne IV, MD at [facility] OR    SMALL INTESTINE SURGERY      TRANSESOPHAGEAL ECHOCARDIOGRAM (KARMEN) N/A 4/27/2018    Performed by Ward Ricks MD at [facility] CATH LAB    UPPER GASTROINTESTINAL ENDOSCOPY           Family History:  Family History   Problem Relation Age of Onset    Hypertension Mother     Cancer Father 49        stomach CA    Stomach cancer Father 49    Liver cancer Father     Rectal cancer Maternal Grandmother         dx in 70s    Celiac disease Neg Hx     Cirrhosis Neg Hx     Colon cancer Neg Hx     Colon polyps Neg Hx     Crohn's disease Neg Hx     Cystic fibrosis Neg Hx     Esophageal cancer Neg Hx     Hemochromatosis Neg Hx     Inflammatory bowel disease Neg Hx     Irritable bowel syndrome Neg  Hx     Liver disease Neg Hx     Ulcerative colitis Neg Hx     Dain's disease Neg Hx     Lymphoma Neg Hx     Tuberculosis Neg Hx     Scleroderma Neg Hx     Rheum arthritis Neg Hx     Multiple sclerosis Neg Hx     Melanoma Neg Hx     Lupus Neg Hx     Psoriasis Neg Hx     Skin cancer Neg Hx        Social History:  Social History     Tobacco Use    Smoking status: Smoker, Current Status Unknown     Packs/day: 0.25     Types: Cigarettes     Last attempt to quit: 12/3/2016     Years since quittin.1    Smokeless tobacco: Never Used   Substance and Sexual Activity    Alcohol use: No     Comment: pt states that she is a recoveirng alcoholic, last drink 11    Drug use: No    Sexual activity: Unknown        Review of Systems   Review of Systems   Constitutional: Negative for chills and fever.   HENT: Negative for sore throat.    Respiratory: Negative for shortness of breath.    Cardiovascular: Negative for chest pain.   Gastrointestinal: Positive for abdominal distention and abdominal pain (generalized). Negative for blood in stool, constipation, diarrhea, nausea and vomiting.   Genitourinary: Negative for dysuria and hematuria.   Musculoskeletal: Negative for back pain.   Skin: Negative for rash.   Neurological: Negative for weakness.   Hematological: Does not bruise/bleed easily.   All other systems reviewed and are negative.       Physical Exam     Initial Vitals [19 2030]   BP Pulse Resp Temp SpO2   138/63 89 (!) 22 97.9 °F (36.6 °C) 97 %      MAP       --          Physical Exam  Nursing Notes and Vital Signs Reviewed.  Constitutional: Patient is in no acute distress. Well-developed and well-nourished.  Head: Atraumatic. Normocephalic.  Eyes: PERRL. EOM intact. Conjunctivae are not pale. No scleral icterus.  ENT: Mucous membranes are moist. Oropharynx is clear and symmetric.    Neck: Supple. Full ROM. No lymphadenopathy.  Cardiovascular: Regular rate. Regular rhythm. No murmurs, rubs, or  "gallops. Distal pulses are 2+ and symmetric.  Pulmonary/Chest: No respiratory distress. Clear to auscultation bilaterally. No wheezing or rales.  Abdominal: Soft and distended.  There is mild generalized tenderness.  No rebound, guarding, or rigidity. Decreased bowel sounds. Feeding tube in place.  Musculoskeletal: Moves all extremities. No obvious deformities. No edema.   Skin: Warm and dry.  Neurological:  Alert, awake, and appropriate.  Normal speech.  No acute focal neurological deficits are appreciated.  Psychiatric: Normal affect. Good eye contact. Appropriate in content.     ED Course   Procedures  ED Vital Signs:  Vitals:    02/11/19 2030   BP: 138/63   Pulse: 89   Resp: (!) 22   Temp: 97.9 °F (36.6 °C)   TempSrc: Oral   SpO2: 97%   Weight: 70.5 kg (155 lb 6.8 oz)   Height: 5' 2" (1.575 m)       Abnormal Lab Results:  Labs Reviewed   COMPREHENSIVE METABOLIC PANEL - Abnormal; Notable for the following components:       Result Value    Glucose 64 (*)     All other components within normal limits   URINALYSIS, REFLEX TO URINE CULTURE - Abnormal; Notable for the following components:    Specific Gravity, UA <=1.005 (*)     Occult Blood UA 3+ (*)     All other components within normal limits    Narrative:     Preferred Collection Type->Urine, Clean Catch   URINALYSIS MICROSCOPIC - Abnormal; Notable for the following components:    RBC, UA 8 (*)     All other components within normal limits    Narrative:     Preferred Collection Type->Urine, Clean Catch   CBC W/ AUTO DIFFERENTIAL   LIPASE   PREGNANCY TEST, URINE RAPID        All Lab Results:  Results for orders placed or performed during the hospital encounter of 02/11/19   CBC W/ AUTO DIFFERENTIAL   Result Value Ref Range    WBC 6.56 3.90 - 12.70 K/uL    RBC 4.29 4.00 - 5.40 M/uL    Hemoglobin 12.6 12.0 - 16.0 g/dL    Hematocrit 37.6 37.0 - 48.5 %    MCV 88 82 - 98 fL    MCH 29.4 27.0 - 31.0 pg    MCHC 33.5 32.0 - 36.0 g/dL    RDW 14.3 11.5 - 14.5 %    Platelets " 201 150 - 350 K/uL    MPV 11.5 9.2 - 12.9 fL    Gran # (ANC) 3.2 1.8 - 7.7 K/uL    Lymph # 2.6 1.0 - 4.8 K/uL    Mono # 0.7 0.3 - 1.0 K/uL    Eos # 0.1 0.0 - 0.5 K/uL    Baso # 0.03 0.00 - 0.20 K/uL    Gran% 49.0 38.0 - 73.0 %    Lymph% 38.9 18.0 - 48.0 %    Mono% 9.9 4.0 - 15.0 %    Eosinophil% 1.7 0.0 - 8.0 %    Basophil% 0.5 0.0 - 1.9 %    Differential Method Automated    Comp. Metabolic Panel   Result Value Ref Range    Sodium 143 136 - 145 mmol/L    Potassium 3.6 3.5 - 5.1 mmol/L    Chloride 109 95 - 110 mmol/L    CO2 25 23 - 29 mmol/L    Glucose 64 (L) 70 - 110 mg/dL    BUN, Bld 14 6 - 20 mg/dL    Creatinine 0.8 0.5 - 1.4 mg/dL    Calcium 9.3 8.7 - 10.5 mg/dL    Total Protein 6.5 6.0 - 8.4 g/dL    Albumin 4.0 3.5 - 5.2 g/dL    Total Bilirubin 0.3 0.1 - 1.0 mg/dL    Alkaline Phosphatase 55 55 - 135 U/L    AST 16 10 - 40 U/L    ALT 12 10 - 44 U/L    Anion Gap 9 8 - 16 mmol/L    eGFR if African American >60 >60 mL/min/1.73 m^2    eGFR if non African American >60 >60 mL/min/1.73 m^2   Lipase   Result Value Ref Range    Lipase 26 4 - 60 U/L   Urinalysis, Reflex to Urine Culture Urine, Clean Catch   Result Value Ref Range    Specimen UA Urine, Clean Catch     Color, UA Yellow Yellow, Straw, [place]    Appearance, UA Clear Clear    pH, UA 7.0 5.0 - 8.0    Specific Gravity, UA <=1.005 (A) 1.005 - 1.030    Protein, UA Negative Negative    Glucose, UA Negative Negative    Ketones, UA Negative Negative    Bilirubin (UA) Negative Negative    Occult Blood UA 3+ (A) Negative    Nitrite, UA Negative Negative    Urobilinogen, UA Negative <2.0 EU/dL    Leukocytes, UA Negative Negative   Pregnancy, urine rapid   Result Value Ref Range    Preg Test, Ur Negative    Urinalysis Microscopic   Result Value Ref Range    RBC, UA 8 (H) 0 - 4 /hpf    Bacteria, UA Rare None-Occ /hpf    Squam Epithel, UA 1 /hpf    Microscopic Comment SEE COMMENT        Imaging Results:  Imaging Results          CT Abdomen Pelvis With Contrast (Final result)   Result time 02/11/19 23:29:59    Final result by Ky Becerra Jr., MD (02/11/19 23:29:59)                 Impression:      Stable left lower lobe pulmonary nodule.  Large amount of colonic fecal material.  As above.    All CT scans at this facility are performed  using dose modulation techniques as appropriate to performed exam including the following:  automated exposure control; adjustment of mA and/or kV according to the patients size (this includes techniques or standardized protocols for targeted exams where dose is matched to indication/reason for exam: i.e. extremities or head);  iterative reconstruction technique.      Electronically signed by: Ky Becerra MD  Date:    02/11/2019  Time:    23:29             Narrative:    EXAMINATION:  CT ABDOMEN PELVIS WITH CONTRAST    CLINICAL HISTORY:  Abdominal distension;    TECHNIQUE:  Postcontrast axial images of the abdomen and pelvis were obtained. Omnipaque 350 was administered intravenously.  Multiplanar reconstruction images were produced.    COMPARISON:  01/06/2019    FINDINGS:  Stable appearing small left lower lobe pulmonary nodule.  Cholecystectomy.  Liver, spleen, pancreas, adrenal glands, aorta, kidneys are unremarkable.  Urinary bladder is unremarkable.  Uterus is intact.  Both ovaries are identified and appear unremarkable.    Percutaneous gastrostomy tube in place, appearing in satisfactory position.  Fluid material within the stomach.  Small bowel anastomosis in the pelvis, appears new since the prior exam.  Laxity of the midline portion of the abdominal wall, with protrusion of large intestine, similar to before.  No dilated or inflamed small bowel loops.  There is a large amount of colonic fecal material present in the ascending, transverse, and descending colon regions.  Is patient constipated to correlate?  No obstructing colonic lesions are identified.  No ascites or significant adenopathy.                                           The  Emergency Provider reviewed the vital signs and test results, which are outlined above.     ED Discussion     12:80 AM: Reassessed pt at this time. Discussed with pt all pertinent ED information and results. Discussed pt dx and plan of tx. Gave pt all f/u and return to the ED instructions. All questions and concerns were addressed at this time. Pt expresses understanding of information and instructions, and is comfortable with plan to discharge. Pt is stable for discharge.    I discussed with patient and/or family/caretaker that evaluation in the ED does not suggest any emergent or life threatening medical conditions requiring immediate intervention beyond what was provided in the ED, and I believe patient is safe for discharge.  Regardless, an unremarkable evaluation in the ED does not preclude the development or presence of a serious of life threatening condition. As such, patient was instructed to return immediately for any worsening or change in current symptoms.          ED Medication(s):  Medications   sodium chloride 0.9% bolus 1,000 mL (1,000 mLs Intravenous New Bag 2/11/19 2233)   ondansetron injection 4 mg (4 mg Intravenous Given 2/11/19 2233)   hydromorphone (PF) injection 1 mg (1 mg Intravenous Given 2/11/19 2233)   omnipaque 350 iohexol 75 mL (75 mLs Intravenous Given 2/11/19 2321)     New Prescriptions    No medications on file       Follow-up Information     Akanksha West MD In 2 days.    Specialty:  Internal Medicine  Contact information:  [address]  [address]  [facility] [address] [phone]                        Medical Decision Making     Medical Decision Making:   Clinical Tests:   Lab Tests: Ordered and Reviewed  Radiological Study: Ordered and Reviewed             Scribe Attestation:   Scribe #1: I performed the above scribed service and the documentation accurately describes the services I performed. I attest to the accuracy of the note.     Attending:   Physician Attestation  Statement for Scribe #1: I, Vivian Coyle Do, MD, personally performed the services described in this documentation, as scribed by Shazia Flores, in my presence, and it is both accurate and complete.           Clinical Impression       ICD-10-CM ICD-9-CM   1. Generalized abdominal pain R10.84 789.07       Disposition:   Disposition: Discharged  Condition: Stable         Vivian Coyle Do, MD  02/12/19 0329

## 2019-02-13 ENCOUNTER — TELEPHONE (OUTPATIENT)
Dept: GASTROENTEROLOGY | Facility: CLINIC | Age: 39
End: 2019-02-13

## 2019-02-13 NOTE — TELEPHONE ENCOUNTER
----- Message from María Galvan sent at 2/13/2019  9:34 AM CST -----  Contact: Beverly navarro/ araseli miller referrals   Beverly navarro/ araseli miller referrals called to speak with someone regarding referral that was sent  Gastric emptying  studies results yet please fax to: Dr. Chevy Ace [phone] call back number: [phone]

## 2019-02-16 ENCOUNTER — HOSPITAL ENCOUNTER (EMERGENCY)
Facility: HOSPITAL | Age: 39
Discharge: HOME | End: 2019-02-17
Attending: EMERGENCY MEDICINE
Payer: COMMERCIAL

## 2019-02-16 DIAGNOSIS — K31.84 GASTROPARESIS: Primary | ICD-10-CM

## 2019-02-16 LAB
ALBUMIN SERPL BCP-MCNC: 3.9 G/DL
ALP SERPL-CCNC: 65 U/L
ALT SERPL W/O P-5'-P-CCNC: 16 U/L
ANION GAP SERPL CALC-SCNC: 7 MMOL/L
AST SERPL-CCNC: 15 U/L
BASOPHILS # BLD AUTO: 0.02 K/UL
BASOPHILS NFR BLD: 0.3 %
BILIRUB SERPL-MCNC: 0.3 MG/DL
BILIRUB UR QL STRIP: NEGATIVE
BUN SERPL-MCNC: 11 MG/DL
CALCIUM SERPL-MCNC: 9.3 MG/DL
CHLORIDE SERPL-SCNC: 107 MMOL/L
CLARITY UR: CLEAR
CO2 SERPL-SCNC: 27 MMOL/L
COLOR UR: YELLOW
CREAT SERPL-MCNC: 0.8 MG/DL
DIFFERENTIAL METHOD: ABNORMAL
EOSINOPHIL # BLD AUTO: 0.1 K/UL
EOSINOPHIL NFR BLD: 1.7 %
ERYTHROCYTE [DISTWIDTH] IN BLOOD BY AUTOMATED COUNT: 14.6 %
EST. GFR  (AFRICAN AMERICAN): >60 ML/MIN/1.73 M^2
EST. GFR  (NON AFRICAN AMERICAN): >60 ML/MIN/1.73 M^2
GLUCOSE SERPL-MCNC: 71 MG/DL
GLUCOSE UR QL STRIP: NEGATIVE
HCT VFR BLD AUTO: 41.6 %
HGB BLD-MCNC: 13.8 G/DL
HGB UR QL STRIP: NEGATIVE
KETONES UR QL STRIP: ABNORMAL
LEUKOCYTE ESTERASE UR QL STRIP: NEGATIVE
LIPASE SERPL-CCNC: 24 U/L
LYMPHOCYTES # BLD AUTO: 2 K/UL
LYMPHOCYTES NFR BLD: 28.7 %
MCH RBC QN AUTO: 29.6 PG
MCHC RBC AUTO-ENTMCNC: 33.2 G/DL
MCV RBC AUTO: 89 FL
MONOCYTES # BLD AUTO: 0.6 K/UL
MONOCYTES NFR BLD: 8.9 %
NEUTROPHILS # BLD AUTO: 4.2 K/UL
NEUTROPHILS NFR BLD: 60.4 %
NITRITE UR QL STRIP: NEGATIVE
PH UR STRIP: 6 [PH] (ref 5–8)
PLATELET # BLD AUTO: 212 K/UL
PMV BLD AUTO: 11 FL
POTASSIUM SERPL-SCNC: 3.5 MMOL/L
PROT SERPL-MCNC: 6.6 G/DL
PROT UR QL STRIP: NEGATIVE
RBC # BLD AUTO: 4.66 M/UL
SODIUM SERPL-SCNC: 141 MMOL/L
SP GR UR STRIP: 1.02 (ref 1–1.03)
URN SPEC COLLECT METH UR: ABNORMAL
UROBILINOGEN UR STRIP-ACNC: 1 EU/DL
WBC # BLD AUTO: 7 K/UL

## 2019-02-16 PROCEDURE — 25000003 PHARM REV CODE 250: Performed by: EMERGENCY MEDICINE

## 2019-02-16 PROCEDURE — 83690 ASSAY OF LIPASE: CPT

## 2019-02-16 PROCEDURE — 96375 TX/PRO/DX INJ NEW DRUG ADDON: CPT

## 2019-02-16 PROCEDURE — 99284 EMERGENCY DEPT VISIT MOD MDM: CPT | Mod: 25

## 2019-02-16 PROCEDURE — 80053 COMPREHEN METABOLIC PANEL: CPT

## 2019-02-16 PROCEDURE — 63600175 PHARM REV CODE 636 W HCPCS: Performed by: EMERGENCY MEDICINE

## 2019-02-16 PROCEDURE — 96361 HYDRATE IV INFUSION ADD-ON: CPT

## 2019-02-16 PROCEDURE — 85025 COMPLETE CBC W/AUTO DIFF WBC: CPT

## 2019-02-16 PROCEDURE — 96374 THER/PROPH/DIAG INJ IV PUSH: CPT

## 2019-02-16 PROCEDURE — 81003 URINALYSIS AUTO W/O SCOPE: CPT

## 2019-02-16 PROCEDURE — 96376 TX/PRO/DX INJ SAME DRUG ADON: CPT

## 2019-02-16 RX ORDER — HYDROMORPHONE HYDROCHLORIDE 2 MG/ML
1 INJECTION, SOLUTION INTRAMUSCULAR; INTRAVENOUS; SUBCUTANEOUS
Status: COMPLETED | OUTPATIENT
Start: 2019-02-16 | End: 2019-02-16

## 2019-02-16 RX ORDER — ONDANSETRON 2 MG/ML
INJECTION INTRAMUSCULAR; INTRAVENOUS
Status: DISPENSED
Start: 2019-02-16 | End: 2019-02-17

## 2019-02-16 RX ORDER — ONDANSETRON 2 MG/ML
4 INJECTION INTRAMUSCULAR; INTRAVENOUS
Status: COMPLETED | OUTPATIENT
Start: 2019-02-16 | End: 2019-02-16

## 2019-02-16 RX ORDER — HYDROMORPHONE HYDROCHLORIDE 2 MG/ML
INJECTION, SOLUTION INTRAMUSCULAR; INTRAVENOUS; SUBCUTANEOUS
Status: DISPENSED
Start: 2019-02-16 | End: 2019-02-17

## 2019-02-16 RX ADMIN — ONDANSETRON 4 MG: 2 INJECTION INTRAMUSCULAR; INTRAVENOUS at 09:02

## 2019-02-16 RX ADMIN — SODIUM CHLORIDE 1000 ML: 0.9 INJECTION, SOLUTION INTRAVENOUS at 09:02

## 2019-02-16 RX ADMIN — HYDROMORPHONE HYDROCHLORIDE 1 MG: 2 INJECTION, SOLUTION INTRAMUSCULAR; INTRAVENOUS; SUBCUTANEOUS at 11:02

## 2019-02-16 RX ADMIN — HYDROMORPHONE HYDROCHLORIDE 1 MG: 2 INJECTION, SOLUTION INTRAMUSCULAR; INTRAVENOUS; SUBCUTANEOUS at 09:02

## 2019-02-17 VITALS
TEMPERATURE: 98 F | HEIGHT: 62 IN | BODY MASS INDEX: 28.8 KG/M2 | OXYGEN SATURATION: 98 % | DIASTOLIC BLOOD PRESSURE: 46 MMHG | WEIGHT: 156.5 LBS | HEART RATE: 80 BPM | SYSTOLIC BLOOD PRESSURE: 96 MMHG | RESPIRATION RATE: 18 BRPM

## 2019-03-05 ENCOUNTER — HOSPITAL ENCOUNTER (EMERGENCY)
Facility: HOSPITAL | Age: 39
Discharge: LEFT BEFORE DISCHARGE | End: 2019-03-05
Attending: EMERGENCY MEDICINE
Payer: COMMERCIAL

## 2019-03-05 VITALS
SYSTOLIC BLOOD PRESSURE: 131 MMHG | HEART RATE: 91 BPM | TEMPERATURE: 98 F | HEIGHT: 62 IN | WEIGHT: 153.75 LBS | DIASTOLIC BLOOD PRESSURE: 74 MMHG | RESPIRATION RATE: 18 BRPM | OXYGEN SATURATION: 99 % | BODY MASS INDEX: 28.29 KG/M2

## 2019-03-05 DIAGNOSIS — R10.9 ABDOMINAL PAIN: ICD-10-CM

## 2019-03-05 DIAGNOSIS — R10.9 CHRONIC ABDOMINAL PAIN: Primary | ICD-10-CM

## 2019-03-05 DIAGNOSIS — G89.29 CHRONIC ABDOMINAL PAIN: Primary | ICD-10-CM

## 2019-03-05 LAB
ALBUMIN SERPL BCP-MCNC: 4.4 G/DL
ALP SERPL-CCNC: 55 U/L
ALT SERPL W/O P-5'-P-CCNC: 15 U/L
ANION GAP SERPL CALC-SCNC: 8 MMOL/L
AST SERPL-CCNC: 18 U/L
BASOPHILS # BLD AUTO: 0.04 K/UL
BASOPHILS NFR BLD: 0.4 %
BILIRUB SERPL-MCNC: 0.4 MG/DL
BUN SERPL-MCNC: 14 MG/DL
CALCIUM SERPL-MCNC: 9.2 MG/DL
CHLORIDE SERPL-SCNC: 106 MMOL/L
CO2 SERPL-SCNC: 24 MMOL/L
CREAT SERPL-MCNC: 0.8 MG/DL
DIFFERENTIAL METHOD: NORMAL
EOSINOPHIL # BLD AUTO: 0.1 K/UL
EOSINOPHIL NFR BLD: 0.7 %
ERYTHROCYTE [DISTWIDTH] IN BLOOD BY AUTOMATED COUNT: 14.3 %
EST. GFR  (AFRICAN AMERICAN): >60 ML/MIN/1.73 M^2
EST. GFR  (NON AFRICAN AMERICAN): >60 ML/MIN/1.73 M^2
GLUCOSE SERPL-MCNC: 83 MG/DL
HCT VFR BLD AUTO: 37.5 %
HGB BLD-MCNC: 13 G/DL
LIPASE SERPL-CCNC: 22 U/L
LYMPHOCYTES # BLD AUTO: 1.9 K/UL
LYMPHOCYTES NFR BLD: 21 %
MCH RBC QN AUTO: 30 PG
MCHC RBC AUTO-ENTMCNC: 34.7 G/DL
MCV RBC AUTO: 87 FL
MONOCYTES # BLD AUTO: 0.7 K/UL
MONOCYTES NFR BLD: 7.8 %
NEUTROPHILS # BLD AUTO: 6.2 K/UL
NEUTROPHILS NFR BLD: 70.2 %
PLATELET # BLD AUTO: 260 K/UL
PMV BLD AUTO: 11.6 FL
POTASSIUM SERPL-SCNC: 3.3 MMOL/L
PROT SERPL-MCNC: 7.3 G/DL
RBC # BLD AUTO: 4.33 M/UL
SODIUM SERPL-SCNC: 138 MMOL/L
WBC # BLD AUTO: 8.9 K/UL

## 2019-03-05 PROCEDURE — 96374 THER/PROPH/DIAG INJ IV PUSH: CPT

## 2019-03-05 PROCEDURE — 96361 HYDRATE IV INFUSION ADD-ON: CPT

## 2019-03-05 PROCEDURE — 99284 EMERGENCY DEPT VISIT MOD MDM: CPT | Mod: 25

## 2019-03-05 PROCEDURE — 83690 ASSAY OF LIPASE: CPT

## 2019-03-05 PROCEDURE — 25000003 PHARM REV CODE 250: Performed by: EMERGENCY MEDICINE

## 2019-03-05 PROCEDURE — 63600175 PHARM REV CODE 636 W HCPCS: Performed by: EMERGENCY MEDICINE

## 2019-03-05 PROCEDURE — 80053 COMPREHEN METABOLIC PANEL: CPT

## 2019-03-05 PROCEDURE — 85025 COMPLETE CBC W/AUTO DIFF WBC: CPT

## 2019-03-05 RX ORDER — MIRTAZAPINE 7.5 MG/1
22.5 TABLET, FILM COATED ORAL NIGHTLY
COMMUNITY
Start: 2019-02-08 | End: 2021-06-04

## 2019-03-05 RX ORDER — ONDANSETRON 2 MG/ML
8 INJECTION INTRAMUSCULAR; INTRAVENOUS
Status: COMPLETED | OUTPATIENT
Start: 2019-03-05 | End: 2019-03-05

## 2019-03-05 RX ORDER — METOCLOPRAMIDE HYDROCHLORIDE 5 MG/ML
10 INJECTION INTRAMUSCULAR; INTRAVENOUS
Status: DISCONTINUED | OUTPATIENT
Start: 2019-03-05 | End: 2019-03-06 | Stop reason: HOSPADM

## 2019-03-05 RX ADMIN — ONDANSETRON 8 MG: 2 INJECTION INTRAMUSCULAR; INTRAVENOUS at 11:03

## 2019-03-05 RX ADMIN — SODIUM CHLORIDE, SODIUM LACTATE, POTASSIUM CHLORIDE, AND CALCIUM CHLORIDE 1000 ML: .6; .31; .03; .02 INJECTION, SOLUTION INTRAVENOUS at 11:03

## 2019-03-06 NOTE — ED NOTES
Pt refused to sign AMA form.  IV found on floor in room still connected to IV tubing.  It was removed by the patient.  Catheter tip intact.

## 2019-03-06 NOTE — ED PROVIDER NOTES
SCRIBE #1 NOTE: I, Lia Ramsay, am scribing for, and in the presence of, Anil Seymour MD. I have scribed the entire note.      History      Chief Complaint   Patient presents with    GI Problem     has gastroparesis. +abdominal distention, N/V. had PEG tube       Review of patient's allergies indicates:   Allergen Reactions    Sulfa (sulfonamide antibiotics)         HPI   HPI    3/5/2019, 10:28 PM   History obtained from the patient      History of Present Illness: Lia Avila is a 39 y.o. female patient with PMHx of gastroparesis who presents to the Emergency Department for abdominal pain and distension. Symptoms are constant and moderate in severity. Pt has a PEG tube placed 7 years ago. Pt has been tube feeding since January. No mitigating or exacerbating factors reported. Associated sxs include nausea. Pt reports she is unable to tolerate anything PO. Patient denies any CP, SOB, HA, fever, chills, cough, emesis, diarrhea, constipation, dysuria, hematuria, frequency, flank pain, vaginal discharge, vaginal bleeding, and all other sxs at this time. No further complaints or concerns at this time.         Arrival mode: Personal vehicle      PCP: Akanksha West MD       Past Medical History:  Past Medical History:   Diagnosis Date    Acid reflux     Aerophagia     Alcoholic     recovering    Anxiety     Depression     Functional gastrointestinal disorder     IBS (irritable bowel syndrome)     Irritable bowel syndrome     Sleep apnea     Trivial aortic valve anomaly        Past Surgical History:  Past Surgical History:   Procedure Laterality Date    BACK SURGERY      fusion of L4, L5, and S1    CHEST TUBE PLACEMENT Left 1/16/2017    Performed by Louis O. Jeansonne IV, [facility] at [facility] OR    CHOLECYSTECTOMY      COLON SURGERY      COLONOSCOPY      Colonoscopy N/A 4/13/2018    Performed by Apurva Salinas MD at [facility] ENDO (4TH FLR)    ESOPHAGOGASTRODUODENOSCOPY (EGD) N/A 4/13/2018     Performed by Apurva Salinas MD at [facility] ENDO (4TH FLR)    ESOPHAGOGASTRODUODENOSCOPY (EGD) N/A 3/28/2017    Performed by Pop Schwab MD at [facility] ENDO    GASTROSTOMY-JEJEUNOSTOMY TUBE CHANGE/PLACEMENT      LAPAROSCOPY-DIAGNOSTIC N/A 1/3/2017    Performed by Louis O. Jeansonne IV, MD at [facility] OR    PQQCC-[id number] N/A 1/3/2017    Performed by Louis O. Jeansonne IV, MD at [facility] OR    MANOMETRY, ESOPHAGUS, WITH IMPEDANCE MEASUREMENT N/A 10/8/2018    Performed by Apurva Salinas MD at [facility] ENDO (4TH FLR)    MANOMETRY-ANORECTAL N/A 4/3/2018    Performed by Apurva Salinas MD at [facility] ENDO (4TH FLR)    RESECTION - SMALL BOWEL N/A 1/3/2017    Performed by Louis O. Jeansonne IV, MD at [facility] OR    SMALL INTESTINE SURGERY      TRANSESOPHAGEAL ECHOCARDIOGRAM (KARMEN) N/A 2018    Performed by Ward Ricks MD at [facility] CATH LAB    UPPER GASTROINTESTINAL ENDOSCOPY           Family History:  Family History   Problem Relation Age of Onset    Hypertension Mother     Cancer Father 49        stomach CA    Stomach cancer Father 49    Liver cancer Father     Rectal cancer Maternal Grandmother         dx in 70s    Celiac disease Neg Hx     Cirrhosis Neg Hx     Colon cancer Neg Hx     Colon polyps Neg Hx     Crohn's disease Neg Hx     Cystic fibrosis Neg Hx     Esophageal cancer Neg Hx     Hemochromatosis Neg Hx     Inflammatory bowel disease Neg Hx     Irritable bowel syndrome Neg Hx     Liver disease Neg Hx     Ulcerative colitis Neg Hx     Dain's disease Neg Hx     Lymphoma Neg Hx     Tuberculosis Neg Hx     Scleroderma Neg Hx     Rheum arthritis Neg Hx     Multiple sclerosis Neg Hx     Melanoma Neg Hx     Lupus Neg Hx     Psoriasis Neg Hx     Skin cancer Neg Hx        Social History:  Social History     Tobacco Use    Smoking status: Smoker, Current Status Unknown     Packs/day: 0.25     Types: Cigarettes     Last attempt to quit: 12/3/2016     Years since quittin.2    Smokeless  tobacco: Never Used   Substance and Sexual Activity    Alcohol use: No     Comment: pt states that she is a recoveirng alcoholic, last drink 7-2-11    Drug use: No    Sexual activity: Not given       ROS   Review of Systems   Constitutional: Negative for chills, diaphoresis and fever.   HENT: Negative for congestion and sore throat.    Respiratory: Negative for cough and shortness of breath.    Cardiovascular: Negative for chest pain.   Gastrointestinal: Positive for abdominal distention, abdominal pain and nausea. Negative for diarrhea and vomiting.   Genitourinary: Negative for dysuria, flank pain, hematuria, pelvic pain, vaginal bleeding and vaginal discharge.   Musculoskeletal: Negative for back pain.   Skin: Negative for rash.   Neurological: Negative for dizziness, syncope, weakness, numbness and headaches.   All other systems reviewed and are negative.      Physical Exam      Initial Vitals [03/05/19 2205]   BP Pulse Resp Temp SpO2   131/74 91 18 98.4 °F (36.9 °C) 99 %      MAP       --          Physical Exam  Nursing Notes and Vital Signs Reviewed.  Constitutional: Patient is in no acute distress. Well-developed.  Head: Atraumatic. Normocephalic.  Eyes: PERRL. EOM intact. Conjunctivae are not pale. No scleral icterus.  ENT: Mucous membranes are moist. Oropharynx is clear and symmetric.    Neck: Supple. Full ROM. No lymphadenopathy.  Cardiovascular: Regular rate. Regular rhythm. No murmurs, rubs, or gallops. Distal pulses are 2+ and symmetric.  Pulmonary/Chest: No respiratory distress. Clear to auscultation bilaterally. No wheezing or rales.  Abdominal: Soft and non-distended.  There is mild LUQ tenderness.  No rebound, guarding, or rigidity. Multiple abdominal surgical incisions.  Genitourinary: No CVA tenderness  Musculoskeletal: Moves all extremities. No obvious deformities. No edema. No calf tenderness.  Skin: Warm and dry. PEG tube clean and intact.  Neurological:  Alert, awake, and appropriate.   "Normal speech.  No acute focal neurological deficits are appreciated.  Psychiatric: Anxious affect. Hyperverbal.     ED Course    Procedures  ED Vital Signs:  Vitals:    03/05/19 2205   BP: 131/74   Pulse: 91   Resp: 18   Temp: 98.4 °F (36.9 °C)   TempSrc: Oral   SpO2: 99%   Weight: 69.7 kg (153 lb 12.3 oz)   Height: 5' 2" (1.575 m)       Abnormal Lab Results:  Labs Reviewed   COMPREHENSIVE METABOLIC PANEL - Abnormal; Notable for the following components:       Result Value    Potassium 3.3 (*)     All other components within normal limits   CBC W/ AUTO DIFFERENTIAL   LIPASE        All Lab Results:  Results for orders placed or performed during the hospital encounter of 03/05/19   CBC auto differential   Result Value Ref Range    WBC 8.90 3.90 - 12.70 K/uL    RBC 4.33 4.00 - 5.40 M/uL    Hemoglobin 13.0 12.0 - 16.0 g/dL    Hematocrit 37.5 37.0 - 48.5 %    MCV 87 82 - 98 fL    MCH 30.0 27.0 - 31.0 pg    MCHC 34.7 32.0 - 36.0 g/dL    RDW 14.3 11.5 - 14.5 %    Platelets 260 150 - 350 K/uL    MPV 11.6 9.2 - 12.9 fL    Gran # (ANC) 6.2 1.8 - 7.7 K/uL    Lymph # 1.9 1.0 - 4.8 K/uL    Mono # 0.7 0.3 - 1.0 K/uL    Eos # 0.1 0.0 - 0.5 K/uL    Baso # 0.04 0.00 - 0.20 K/uL    Gran% 70.2 38.0 - 73.0 %    Lymph% 21.0 18.0 - 48.0 %    Mono% 7.8 4.0 - 15.0 %    Eosinophil% 0.7 0.0 - 8.0 %    Basophil% 0.4 0.0 - 1.9 %    Differential Method Automated    Comprehensive metabolic panel   Result Value Ref Range    Sodium 138 136 - 145 mmol/L    Potassium 3.3 (L) 3.5 - 5.1 mmol/L    Chloride 106 95 - 110 mmol/L    CO2 24 23 - 29 mmol/L    Glucose 83 70 - 110 mg/dL    BUN, Bld 14 6 - 20 mg/dL    Creatinine 0.8 0.5 - 1.4 mg/dL    Calcium 9.2 8.7 - 10.5 mg/dL    Total Protein 7.3 6.0 - 8.4 g/dL    Albumin 4.4 3.5 - 5.2 g/dL    Total Bilirubin 0.4 0.1 - 1.0 mg/dL    Alkaline Phosphatase 55 55 - 135 U/L    AST 18 10 - 40 U/L    ALT 15 10 - 44 U/L    Anion Gap 8 8 - 16 mmol/L    eGFR if African American >60 >60 mL/min/1.73 m^2    eGFR if non " "African American >60 >60 mL/min/1.73 m^2   Lipase   Result Value Ref Range    Lipase 22 4 - 60 U/L         Imaging Results:  Imaging Results          X-Ray Abdomen Flat And Erect (Final result)  Result time 03/05/19 23:32:05    Final result by Julio Contreras MD (03/05/19 23:32:05)                 Impression:      No acute findings.  Please see above.      Electronically signed by: Julio Contreras MD  Date:    03/05/2019  Time:    23:32             Narrative:    EXAMINATION:  XR ABDOMEN FLAT AND ERECT    CLINICAL HISTORY:  Unspecified abdominal pain    COMPARISON:  December 29, 2018.    FINDINGS:  Left upper quadrant tube.  Postop changes left upper quadrant.  Postop changes right upper quadrant consistent with cholecystectomy.    Postop changes lumbosacral spine.    Lung bases are clear.  The bowel gas pattern is unremarkable with moderate stool in the large bowel.                                        The Emergency Provider reviewed the vital signs and test results, which are outlined above.    ED Discussion     11:40 PM: Pt voices desire to leave hospital because she is not getting Dilaudid before labs result. Pt ripped out her IV and is refusing to sign AMA. States "usually when I come in here I have an IV put in and Dilaudid in minutes. It's the only thing it works." Not interested in trying Reglan, Toradol, etc. before progressing therapy. Pt is A&O x3, appropriate and competent at this time.  D/w pt in length need for further evaluation and treatment due to HPI and PEx. Pt declines any further evaluation or tx at this time. All risks, including worsening sx, permanent bodily harm and death, were discussed in length. Pt acknowledges all risk at this time. Pt given RTER instructions. All questions and concerns addressed at this time. Pt leaving AMA.     Patient's workup has resulted unremarkable and stable except had not been tested to be able to tolerate PEG feeds or PO yet.        ED " Medication(s):  Medications   lactated ringers bolus 1,000 mL (0 mLs Intravenous Stopped 3/5/19 9634)   ondansetron injection 8 mg (8 mg Intravenous Given 3/5/19 6321)       Discharge Medication List as of 3/5/2019 11:40 PM                Medical Decision Making    Medical Decision Making:   Clinical Tests:   Lab Tests: Ordered and Reviewed  Radiological Study: Ordered and Reviewed           Scribe Attestation:   Scribe #1: I performed the above scribed service and the documentation accurately describes the services I performed. I attest to the accuracy of the note.    Attending:   Physician Attestation Statement for Scribe #1: I, Anil Seymour MD, personally performed the services described in this documentation, as scribed by Lia Ramsay, in my presence, and it is both accurate and complete.          Clinical Impression       ICD-10-CM ICD-9-CM   1. Chronic abdominal pain R10.9 789.00    G89.29 338.29   2. Abdominal pain R10.9 789.00       Disposition:   Disposition: AMA  AMA: Patient left AMA after: MD assigned, HPI, exam, lab drawn and x-rays done. Patient status: competent, oriented x 3 and not intoxicated. AMA Form: patient refused to sign            Anil Seymour MD  03/06/19 9647

## 2019-03-06 NOTE — ED NOTES
"Pt states she cannot take Reglan due to the fact that she has Tardive Dyskinesia from taking it.  Informed her I notify MD.  She also asked for something for pain, stating that he "looked her in the eye and said he would give me something for pain."  "

## 2019-03-06 NOTE — ED NOTES
"Pt is requesting suction to be attached to her PEG tube to "suck all that stuff out".  When asked what "stuff" she was referring to she stated all the air in there.  She states she has had it done at this facility in the past.  She also requested Dilaudid my name when the MD was at [facility].  "

## 2019-03-06 NOTE — ED NOTES
"Pt asked if I had mentioned to MD how uncomfortable she was and in need of pain medications.  I told her MD stated he wanted to see her lab results before deciding on any pain medication. She then stated to me that she needs to either speak to him or be discharged if she was not going to get anything for pain.  She stated "what is the point in me coming her if I am not going to get any help?"  I informed MD of patients requests and he stated he will go speak to her.  "

## 2019-04-17 ENCOUNTER — HOSPITAL ENCOUNTER (EMERGENCY)
Facility: HOSPITAL | Age: 39
Discharge: HOME | End: 2019-04-18
Attending: EMERGENCY MEDICINE
Payer: COMMERCIAL

## 2019-04-17 DIAGNOSIS — K59.09 CHRONIC CONSTIPATION: ICD-10-CM

## 2019-04-17 DIAGNOSIS — K43.9 HERNIA OF ABDOMINAL WALL: ICD-10-CM

## 2019-04-17 DIAGNOSIS — G89.29 CHRONIC ABDOMINAL PAIN: Primary | ICD-10-CM

## 2019-04-17 DIAGNOSIS — R10.9 CHRONIC ABDOMINAL PAIN: Primary | ICD-10-CM

## 2019-04-17 PROCEDURE — 25000003 PHARM REV CODE 250: Performed by: EMERGENCY MEDICINE

## 2019-04-17 PROCEDURE — 99284 EMERGENCY DEPT VISIT MOD MDM: CPT | Mod: 25

## 2019-04-17 PROCEDURE — 96372 THER/PROPH/DIAG INJ SC/IM: CPT

## 2019-04-17 PROCEDURE — 63600175 PHARM REV CODE 636 W HCPCS: Performed by: EMERGENCY MEDICINE

## 2019-04-17 RX ORDER — ONDANSETRON 2 MG/ML
4 INJECTION INTRAMUSCULAR; INTRAVENOUS
Status: DISCONTINUED | OUTPATIENT
Start: 2019-04-17 | End: 2019-04-17

## 2019-04-17 RX ORDER — ONDANSETRON 4 MG/1
4 TABLET, ORALLY DISINTEGRATING ORAL
Status: COMPLETED | OUTPATIENT
Start: 2019-04-17 | End: 2019-04-17

## 2019-04-17 RX ORDER — HYDROMORPHONE HYDROCHLORIDE 2 MG/ML
0.5 INJECTION, SOLUTION INTRAMUSCULAR; INTRAVENOUS; SUBCUTANEOUS
Status: COMPLETED | OUTPATIENT
Start: 2019-04-17 | End: 2019-04-17

## 2019-04-17 RX ORDER — MUPIROCIN 20 MG/G
OINTMENT TOPICAL 2 TIMES DAILY
Qty: 22 G | Refills: 0 | Status: SHIPPED | OUTPATIENT
Start: 2019-04-17 | End: 2021-06-04

## 2019-04-17 RX ORDER — HYDROMORPHONE HYDROCHLORIDE 2 MG/ML
0.5 INJECTION, SOLUTION INTRAMUSCULAR; INTRAVENOUS; SUBCUTANEOUS
Status: DISCONTINUED | OUTPATIENT
Start: 2019-04-17 | End: 2019-04-17

## 2019-04-17 RX ADMIN — ONDANSETRON 4 MG: 4 TABLET, ORALLY DISINTEGRATING ORAL at 11:04

## 2019-04-17 RX ADMIN — HYDROMORPHONE HYDROCHLORIDE 0.5 MG: 2 INJECTION, SOLUTION INTRAMUSCULAR; INTRAVENOUS; SUBCUTANEOUS at 11:04

## 2019-04-18 VITALS
BODY MASS INDEX: 27.67 KG/M2 | SYSTOLIC BLOOD PRESSURE: 125 MMHG | TEMPERATURE: 99 F | DIASTOLIC BLOOD PRESSURE: 71 MMHG | WEIGHT: 150.38 LBS | HEIGHT: 62 IN | OXYGEN SATURATION: 99 % | HEART RATE: 89 BPM | RESPIRATION RATE: 20 BRPM

## 2019-04-18 NOTE — ED PROVIDER NOTES
SCRIBE #1 NOTE: I, Shilpa Islas, am scribing for, and in the presence of, Clem Lovell Jr., MD. I have scribed the entire note.      History      Chief Complaint   Patient presents with    Abdominal Pain     States having nausea and abd bloating with ventral hernia. States skin has split open from hernia       Review of patient's allergies indicates:   Allergen Reactions    Sulfa (sulfonamide antibiotics)         HPI   HPI    4/17/2019, 11:02 PM   History obtained from the patient      History of Present Illness: Lia Avila is a 39 y.o. female patient with PMHx of IBS who presents to the Emergency Department for abd pain secondary to skin splitting open from hernia suddenly today. Symptoms are constant and moderate in severity. No mitigating or exacerbating factors reported. Associated sxs include N/V. Patient denies any fever, chills, diaphoresis, SOB, CP, diarrhea, dysuria, back pain, HA, extremity weakness/numbness, and all other sxs at this time. No prior txs reported. Pt reports she has had multiple surgeries on abd and currently has a ventral hernia. Pt states she is home bound. Pt also reports [facility] has already accepted her case. No further complaints or concerns at this time.       Arrival mode: Personal vehicle      PCP: Akanksha West MD       Past Medical History:  Past Medical History:   Diagnosis Date    Acid reflux     Aerophagia     Alcoholic     recovering    Anxiety     Depression     Functional gastrointestinal disorder     IBS (irritable bowel syndrome)     Irritable bowel syndrome     Sleep apnea     Trivial aortic valve anomaly        Past Surgical History:  Past Surgical History:   Procedure Laterality Date    BACK SURGERY      fusion of L4, L5, and S1    CHEST TUBE PLACEMENT Left 1/16/2017    Performed by [facility] O. Jeansonne IV, MD at [facility] OR    CHOLECYSTECTOMY      COLON SURGERY      COLONOSCOPY      Colonoscopy N/A 4/13/2018    Performed  by Apurva [facility]inas MD at [facility] ENDO (4TH FLR)    ESOPHAGOGASTRODUODENOSCOPY (EGD) N/A 4/13/2018    Performed by Apurva Salinas MD at [facility] ENDO (4TH FLR)    ESOPHAGOGASTRODUODENOSCOPY (EGD) N/A 3/28/2017    Performed by Pop Schwab MD at [facility] ENDO    GASTROSTOMY-JEJEUNOSTOMY TUBE CHANGE/PLACEMENT      LAPAROSCOPY-DIAGNOSTIC N/A 1/3/2017    Performed by Louis O. Jeansonne IV, MD at [facility] OR    [id number] N/A 1/3/2017    Performed by Louis O. Jeansonne IV, MD at [facility] OR    MANOMETRY, ESOPHAGUS, WITH IMPEDANCE MEASUREMENT N/A 10/8/2018    Performed by Apurva Salinas MD at [facility] ENDO (4TH FLR)    MANOMETRY-ANORECTAL N/A 4/3/2018    Performed by [facility] MD at [facility] ENDO (4TH FLR)    RESECTION - SMALL BOWEL N/A 1/3/2017    Performed by Louis O. Jeansonne IV, MD at [facility] OR    SMALL INTESTINE SURGERY      TRANSESOPHAGEAL ECHOCARDIOGRAM (KARMEN) N/A 4/27/2018    Performed by Ward Ricks MD at [facility] CATH LAB    UPPER GASTROINTESTINAL ENDOSCOPY           Family History:  Family History   Problem Relation Age of Onset    Hypertension Mother     Cancer Father 49        stomach CA    Stomach cancer Father 49    Liver cancer Father     Rectal cancer Maternal Grandmother         dx in 70s    Celiac disease Neg Hx     Cirrhosis Neg Hx     Colon cancer Neg Hx     Colon polyps Neg Hx     Crohn's disease Neg Hx     Cystic fibrosis Neg Hx     Esophageal cancer Neg Hx     Hemochromatosis Neg Hx     Inflammatory bowel disease Neg Hx     Irritable bowel syndrome Neg Hx     Liver disease Neg Hx     Ulcerative colitis Neg Hx     Dain's disease Neg Hx     Lymphoma Neg Hx     Tuberculosis Neg Hx     Scleroderma Neg Hx     Rheum arthritis Neg Hx     Multiple sclerosis Neg Hx     Melanoma Neg Hx     Lupus Neg Hx     Psoriasis Neg Hx     Skin cancer Neg Hx        Social History:  Social History     Tobacco Use    Smoking status: Smoker, Current Status Unknown     Packs/day: 0.25      Types: Cigarettes     Last attempt to quit: 12/3/2016     Years since quittin.3    Smokeless tobacco: Never Used   Substance and Sexual Activity    Alcohol use: No     Comment: pt states that she is a recoveirng alcoholic, last drink -    Drug use: No    Sexual activity: Unknown       ROS   Review of Systems   Constitutional: Negative for chills, diaphoresis and fever.   HENT: Negative for sore throat.    Respiratory: Negative for shortness of breath.    Cardiovascular: Negative for chest pain.   Gastrointestinal: Positive for abdominal pain, nausea and vomiting. Negative for diarrhea.   Genitourinary: Negative for dysuria.   Musculoskeletal: Negative for back pain.   Skin: Negative for rash.   Neurological: Negative for weakness, light-headedness, numbness and headaches.   Hematological: Does not bruise/bleed easily.   All other systems reviewed and are negative.    Physical Exam      Initial Vitals [19 2140]   BP Pulse Resp Temp SpO2   (!) 141/68 99 20 98.5 °F (36.9 °C) 99 %      MAP       --          Physical Exam  Nursing Notes and Vital Signs Reviewed.  Constitutional: Patient is in no acute distress. Well-developed and well-nourished.  Head: Atraumatic. Normocephalic.  Eyes: PERRL. EOM intact. Conjunctivae are not pale. No scleral icterus.  ENT: Mucous membranes are moist. Oropharynx is clear and symmetric.    Neck: Supple. Full ROM. No lymphadenopathy.  Cardiovascular: Regular rate. Regular rhythm. No murmurs, rubs, or gallops. Distal pulses are 2+ and symmetric.  Pulmonary/Chest: No respiratory distress. Clear to auscultation bilaterally. No wheezing or rales.  Abdominal: Chronic constipation. Feeding tube in abd wall. Lower abd wall hernia with small abrasion noted above area. There is no tenderness. No rebound, guarding, or rigidity.   Genitourinary: No CVA tenderness  Musculoskeletal: Moves all extremities. No obvious deformities. No edema. No calf tenderness.  Skin: Warm and  "dry.  Neurological:  Alert, awake, and appropriate.  Normal speech.  No acute focal neurological deficits are appreciated.  Psychiatric: Normal affect. Good eye contact. Appropriate in content.    ED Course    Procedures  ED Vital Signs:  Vitals:    04/17/19 2140   BP: (!) 141/68   Pulse: 99   Resp: 20   Temp: 98.5 °F (36.9 °C)   TempSrc: Oral   SpO2: 99%   Weight: 68.2 kg (150 lb 5.7 oz)   Height: 5' 2" (1.575 m)       Imaging Results:  Imaging Results          X-Ray Abdomen Flat And Erect (Final result)  Result time 04/17/19 23:45:13    Final result by Win Castro Jr., MD (04/17/19 23:45:13)                 Impression:      1. No acute abdominal findings.  Constipation.      Electronically signed by: Win Castro Jr., MD  Date:    04/17/2019  Time:    23:45             Narrative:    EXAMINATION:  XR ABDOMEN FLAT AND ERECT    CLINICAL HISTORY:  abdominal bloating;    COMPARISON:  03/05/2019    FINDINGS:  Nonobstructive bowel gas pattern.  No evidence of organomegaly.  No abnormal calcifications.  Osseous structures intact.  Lung bases are clear.  Gastric feeding tube left upper quadrant of the abdomen.  Prior lumbar fusion.  Large amount of stool in the rectum and colon.                                        The Emergency Provider reviewed the vital signs and test results, which are outlined above.    ED Discussion     11:46 PM: Reassessed pt at this Discussed with pt all pertinent ED information and results. Discussed pt dx and plan of tx. Gave pt all f/u and return to the ED instructions. All questions and concerns were addressed at this time. Pt expresses understanding of information and instructions, and is comfortable with plan to discharge. Pt is stable for discharge.    I discussed with patient and/or family/caretaker that evaluation in the ED does not suggest any emergent or life threatening medical conditions requiring immediate intervention beyond what was provided in the ED, and I believe patient " is safe for discharge.  Regardless, an unremarkable evaluation in the ED does not preclude the development or presence of a serious of life threatening condition. As such, patient was instructed to return immediately for any worsening or change in current symptoms.      ED Medication(s):  Medications   hydromorphone (PF) injection 0.5 mg (0.5 mg Intramuscular Given 4/17/19 2347)   ondansetron disintegrating tablet 4 mg (4 mg Oral Given 4/17/19 2348)       Follow-up Information     Akanksha West MD. Call in 1 day.    Specialty:  Internal Medicine  Why:  to schedule appt for wound recheck  Contact information:  [address]  [address]  [place] [address]  [phone]             Louis O. Jeansonne, MD. Call in 1 day.    Specialties:  General Surgery, Surgery  Why:  to schedule appt for wound recheck of your abdominal wall hernia - apply bactroban to wound every 12 hours until healed  Contact information:  [address] [place] [address]  [phone]                  New Prescriptions    MUPIROCIN (BACTROBAN) 2 % OINTMENT    Apply topically 2 (two) times daily. Apply small amount to abdominal wall wound after gently cleaning area with soap and water          Medical Decision Making    Medical Decision Making:   Clinical Tests:   Radiological Study: Ordered and Reviewed           Scribe Attestation:   Scribe #1: I performed the above scribed service and the documentation accurately describes the services I performed. I attest to the accuracy of the note.    Attending:   Physician Attestation Statement for Scribe #1: I, Clem Lovell Jr., MD, personally performed the services described in this documentation, as scribed by Shilpa Islas, in my presence, and it is both accurate and complete.          Clinical Impression       ICD-10-CM ICD-9-CM   1. Chronic abdominal pain R10.9 789.00    G89.29 338.29   2. Chronic constipation K59.09 564.00   3. Hernia of abdominal wall K43.9 553.20        Disposition:   Disposition: Discharged  Condition: Stable         Clem Lovell Jr., MD  04/18/19 0044

## 2019-05-07 ENCOUNTER — HOSPITAL ENCOUNTER (EMERGENCY)
Facility: HOSPITAL | Age: 39
Discharge: HOME | End: 2019-05-07
Attending: EMERGENCY MEDICINE
Payer: COMMERCIAL

## 2019-05-07 VITALS
BODY MASS INDEX: 27.04 KG/M2 | SYSTOLIC BLOOD PRESSURE: 98 MMHG | WEIGHT: 146.94 LBS | TEMPERATURE: 98 F | HEART RATE: 70 BPM | HEIGHT: 62 IN | OXYGEN SATURATION: 100 % | RESPIRATION RATE: 20 BRPM | DIASTOLIC BLOOD PRESSURE: 58 MMHG

## 2019-05-07 DIAGNOSIS — R10.84 GENERALIZED ABDOMINAL PAIN: Primary | ICD-10-CM

## 2019-05-07 LAB
ALBUMIN SERPL BCP-MCNC: 3.8 G/DL (ref 3.5–5.2)
ALP SERPL-CCNC: 50 U/L (ref 55–135)
ALT SERPL W/O P-5'-P-CCNC: 9 U/L (ref 10–44)
ANION GAP SERPL CALC-SCNC: 9 MMOL/L (ref 8–16)
AST SERPL-CCNC: 16 U/L (ref 10–40)
B-HCG UR QL: NEGATIVE
BASOPHILS # BLD AUTO: 0.03 K/UL (ref 0–0.2)
BASOPHILS NFR BLD: 0.4 % (ref 0–1.9)
BILIRUB SERPL-MCNC: 0.3 MG/DL (ref 0.1–1)
BILIRUB UR QL STRIP: NEGATIVE
BUN SERPL-MCNC: 12 MG/DL (ref 6–20)
CALCIUM SERPL-MCNC: 9.3 MG/DL (ref 8.7–10.5)
CHLORIDE SERPL-SCNC: 113 MMOL/L (ref 95–110)
CLARITY UR: CLEAR
CO2 SERPL-SCNC: 20 MMOL/L (ref 23–29)
COLOR UR: YELLOW
CREAT SERPL-MCNC: 0.8 MG/DL (ref 0.5–1.4)
DIFFERENTIAL METHOD: NORMAL
EOSINOPHIL # BLD AUTO: 0.1 K/UL (ref 0–0.5)
EOSINOPHIL NFR BLD: 1.1 % (ref 0–8)
ERYTHROCYTE [DISTWIDTH] IN BLOOD BY AUTOMATED COUNT: 13.8 % (ref 11.5–14.5)
EST. GFR  (AFRICAN AMERICAN): >60 ML/MIN/1.73 M^2
EST. GFR  (NON AFRICAN AMERICAN): >60 ML/MIN/1.73 M^2
GLUCOSE SERPL-MCNC: 84 MG/DL (ref 70–110)
GLUCOSE UR QL STRIP: NEGATIVE
HCT VFR BLD AUTO: 39.1 % (ref 37–48.5)
HGB BLD-MCNC: 12.9 G/DL (ref 12–16)
HGB UR QL STRIP: NEGATIVE
KETONES UR QL STRIP: ABNORMAL
LEUKOCYTE ESTERASE UR QL STRIP: NEGATIVE
LIPASE SERPL-CCNC: 17 U/L (ref 4–60)
LYMPHOCYTES # BLD AUTO: 2 K/UL (ref 1–4.8)
LYMPHOCYTES NFR BLD: 25.4 % (ref 18–48)
MCH RBC QN AUTO: 28.9 PG (ref 27–31)
MCHC RBC AUTO-ENTMCNC: 33 G/DL (ref 32–36)
MCV RBC AUTO: 88 FL (ref 82–98)
MONOCYTES # BLD AUTO: 0.6 K/UL (ref 0.3–1)
MONOCYTES NFR BLD: 7.5 % (ref 4–15)
NEUTROPHILS # BLD AUTO: 5.2 K/UL (ref 1.8–7.7)
NEUTROPHILS NFR BLD: 65.6 % (ref 38–73)
NITRITE UR QL STRIP: NEGATIVE
PH UR STRIP: 8 [PH] (ref 5–8)
PLATELET # BLD AUTO: 224 K/UL (ref 150–350)
PMV BLD AUTO: 11.1 FL (ref 9.2–12.9)
POTASSIUM SERPL-SCNC: 3.9 MMOL/L (ref 3.5–5.1)
PROT SERPL-MCNC: 6.6 G/DL (ref 6–8.4)
PROT UR QL STRIP: ABNORMAL
RBC # BLD AUTO: 4.46 M/UL (ref 4–5.4)
SODIUM SERPL-SCNC: 142 MMOL/L (ref 136–145)
SP GR UR STRIP: 1.01 (ref 1–1.03)
URN SPEC COLLECT METH UR: ABNORMAL
UROBILINOGEN UR STRIP-ACNC: 1 EU/DL
WBC # BLD AUTO: 7.87 K/UL (ref 3.9–12.7)

## 2019-05-07 PROCEDURE — 83690 ASSAY OF LIPASE: CPT

## 2019-05-07 PROCEDURE — 96372 THER/PROPH/DIAG INJ SC/IM: CPT | Mod: 59

## 2019-05-07 PROCEDURE — 85025 COMPLETE CBC W/AUTO DIFF WBC: CPT

## 2019-05-07 PROCEDURE — 81003 URINALYSIS AUTO W/O SCOPE: CPT

## 2019-05-07 PROCEDURE — 80053 COMPREHEN METABOLIC PANEL: CPT

## 2019-05-07 PROCEDURE — 96375 TX/PRO/DX INJ NEW DRUG ADDON: CPT

## 2019-05-07 PROCEDURE — 96374 THER/PROPH/DIAG INJ IV PUSH: CPT

## 2019-05-07 PROCEDURE — 63600175 PHARM REV CODE 636 W HCPCS: Performed by: EMERGENCY MEDICINE

## 2019-05-07 PROCEDURE — 99284 EMERGENCY DEPT VISIT MOD MDM: CPT | Mod: 25

## 2019-05-07 PROCEDURE — 81025 URINE PREGNANCY TEST: CPT

## 2019-05-07 PROCEDURE — 25000003 PHARM REV CODE 250: Performed by: EMERGENCY MEDICINE

## 2019-05-07 PROCEDURE — 36415 COLL VENOUS BLD VENIPUNCTURE: CPT

## 2019-05-07 RX ORDER — PROMETHAZINE HYDROCHLORIDE 25 MG/ML
25 INJECTION, SOLUTION INTRAMUSCULAR; INTRAVENOUS
Status: COMPLETED | OUTPATIENT
Start: 2019-05-07 | End: 2019-05-07

## 2019-05-07 RX ORDER — MORPHINE SULFATE 4 MG/ML
4 INJECTION, SOLUTION INTRAMUSCULAR; INTRAVENOUS
Status: COMPLETED | OUTPATIENT
Start: 2019-05-07 | End: 2019-05-07

## 2019-05-07 RX ORDER — ONDANSETRON 2 MG/ML
4 INJECTION INTRAMUSCULAR; INTRAVENOUS
Status: COMPLETED | OUTPATIENT
Start: 2019-05-07 | End: 2019-05-07

## 2019-05-07 RX ORDER — PROMETHAZINE HYDROCHLORIDE 25 MG/1
25 SUPPOSITORY RECTAL EVERY 6 HOURS PRN
Qty: 16 SUPPOSITORY | Refills: 0 | OUTPATIENT
Start: 2019-05-07 | End: 2022-01-05

## 2019-05-07 RX ADMIN — SODIUM CHLORIDE 1000 ML: 0.9 INJECTION, SOLUTION INTRAVENOUS at 03:05

## 2019-05-07 RX ADMIN — ONDANSETRON 4 MG: 2 INJECTION INTRAMUSCULAR; INTRAVENOUS at 03:05

## 2019-05-07 RX ADMIN — PROMETHAZINE HYDROCHLORIDE 25 MG: 25 INJECTION INTRAMUSCULAR; INTRAVENOUS at 04:05

## 2019-05-07 RX ADMIN — MORPHINE SULFATE 4 MG: 4 INJECTION INTRAVENOUS at 03:05

## 2019-05-07 NOTE — ED PROVIDER NOTES
SCRIBE #1 NOTE: I, Candice Myrick, am scribing for, and in the presence of, Oscar Guerra MD. I have scribed the HPI, ROS, and PEx.     SCRIBE #2 NOTE: I, Lia Ramsay, am scribing for, and in the presence of,  James Myrick MD. I have scribed the remaining portions of the note not scribed by Scribe #1.      History     Chief Complaint   Patient presents with    Abdominal Pain     vomiting x 1 week, unable to do peg feedings     Review of patient's allergies indicates:   Allergen Reactions    Sulfa (sulfonamide antibiotics)          History of Present Illness     HPI    5/7/2019, 2:59 PM  History obtained from the patient      History of Present Illness: Lia Avila is a 39 y.o. female patient with a PMHx of IBS and GERD who presents to the Emergency Department for evaluation of generalized abd pain which onset gradually 10 days ago. Symptoms are constant and moderate in severity. No mitigating or exacerbating factors reported. Associated sxs include n/v. Pt states she cannot tolerate her PEG feedings, and she has been vomiting bile. Patient denies any fever, chills, cough, CP, SOB, diarrhea, blood in stool, constipation, dysuria, hematuria, and all other sxs at this time. Pt has been referred to the [facility] for undiagnosed GI issues and will see them at the end of the month. No further complaints or concerns at this time.       Arrival mode: Personal vehicle      PCP: Akanksha West MD        Past Medical History:  Past Medical History:   Diagnosis Date    Acid reflux     Aerophagia     Alcoholic     recovering    Anxiety     Depression     Functional gastrointestinal disorder     IBS (irritable bowel syndrome)     Irritable bowel syndrome     Sleep apnea     Trivial aortic valve anomaly        Past Surgical History:  Past Surgical History:   Procedure Laterality Date    BACK SURGERY      fusion of L4, L5, and S1    CHEST TUBE PLACEMENT Left 1/16/2017    Performed by James MELCHOR  Jeansonne IV, MD at [facility] OR    CHOLECYSTECTOMY      COLON SURGERY      COLONOSCOPY      Colonoscopy N/A 4/13/2018    Performed by Apurva Salinas MD at [facility] ENDO (4TH FLR)    ESOPHAGOGASTRODUODENOSCOPY (EGD) N/A 4/13/2018    Performed by Apurva Salinas MD at [facility] END[facility] (4TH FLR)    ESOPHAGOGASTRODUODENOSCOPY (EGD) N/A 3/28/2017    Performed by Pop Schwab MD at [facility]    GASTROSTOMY-JEJEUNOSTOMY TUBE CHANGE/PLACEMENT      LAPAROSCOPY-DIAGNOSTIC N/A 1/3/2017    Performed by Louis O. Jeansonne IV, MD at [facility] OR    RVTZH-PWWVAXCU-OHFOMDJXEIJU N/A 1/3/2017    Performed by Louis O. Jeansonne IV, MD at [facility] OR    MANOMETRY, ESOPHAGUS, WITH IMPEDANCE MEASUREMENT N/A 10/8/2018    Performed by Apurva Salinas MD at [facility] END[facility] (4TH FLR)    MANOMETRY-ANORECTAL N/A 4/3/2018    Performed by Apurva Salinas MD at [facility] (4TH FLR)    RESECTION - SMALL BOWEL N/A 1/3/2017    Performed by Louis O. Jeansonne IV, MD at [facility] OR    SMALL INTESTINE SURGERY      TRANSESOPHAGEAL ECHOCARDIOGRAM (KARMEN) N/A 4/27/2018    Performed by Ward Ricks MD at [facility] CATH LAB    UPPER GASTROINTESTINAL ENDOSCOPY           Family History:  Family History   Problem Relation Age of Onset    Hypertension Mother     Cancer Father 49        stomach CA    Stomach cancer Father 49    Liver cancer Father     Rectal cancer Maternal Grandmother         dx in 70s    Celiac disease Neg Hx     Cirrhosis Neg Hx     Colon cancer Neg Hx     Colon polyps Neg Hx     Crohn's disease Neg Hx     Cystic fibrosis Neg Hx     Esophageal cancer Neg Hx     Hemochromatosis Neg Hx     Inflammatory bowel disease Neg Hx     Irritable bowel syndrome Neg Hx     Liver disease Neg Hx     Ulcerative colitis Neg Hx     Dain's disease Neg Hx     Lymphoma Neg Hx     Tuberculosis Neg Hx     Scleroderma Neg Hx     Rheum arthritis Neg Hx     Multiple sclerosis Neg Hx     Melanoma Neg Hx     Lupus Neg Hx     Psoriasis Neg Hx     Skin cancer Neg  Hx        Social History:  Social History     Tobacco Use    Smoking status: Smoker, Current Status Unknown     Packs/day: 0.25     Types: Cigarettes     Last attempt to quit: 12/3/2016     Years since quittin.4    Smokeless tobacco: Never Used   Substance and Sexual Activity    Alcohol use: No     Comment: pt states that she is a recoveirng alcoholic, last drink 11    Drug use: Yes     Types: Marijuana    Sexual activity: Unknown        Review of Systems     Review of Systems   Constitutional: Negative for chills and fever.   HENT: Negative for congestion and sore throat.    Respiratory: Negative for cough and shortness of breath.    Cardiovascular: Negative for chest pain.   Gastrointestinal: Positive for abdominal pain (Generalized), nausea and vomiting. Negative for blood in stool, constipation and diarrhea.   Genitourinary: Negative for dysuria, frequency and hematuria.   Musculoskeletal: Negative for back pain.   Skin: Negative for rash.   Neurological: Negative for dizziness, weakness and numbness.   Hematological: Does not bruise/bleed easily.   All other systems reviewed and are negative.     Physical Exam     Initial Vitals [19 1449]   BP Pulse Resp Temp SpO2   124/69 85 18 98 °F (36.7 °C) 99 %      MAP       --          Physical Exam  Nursing Notes and Vital Signs Reviewed.  Constitutional: Patient is in no acute distress. Well-developed and well-nourished.  Head: Atraumatic. Normocephalic.  Eyes: PERRL. EOM intact. Conjunctivae are not pale. No scleral icterus.  ENT: Mucous membranes are moist. Oropharynx is clear and symmetric.    Neck: Supple. Full ROM. No lymphadenopathy.  Cardiovascular: Regular rate. Regular rhythm. No murmurs, rubs, or gallops. Distal pulses are 2+ and symmetric.  Pulmonary/Chest: No respiratory distress. Clear to auscultation bilaterally. No wheezing or rales.  Abdominal: Soft and non-distended. Mild generalized tenderness. PEG tube to upper abd. No rebound,  "guarding, or rigidity. Good bowel sounds.  Genitourinary: No CVA tenderness  Musculoskeletal: Moves all extremities. No obvious deformities. No edema. No calf tenderness.  Skin: Warm and dry.  Neurological:  Alert, awake, and appropriate.  Normal speech.  No acute focal neurological deficits are appreciated.  Psychiatric: Normal affect. Good eye contact. Appropriate in content.     ED Course   Procedures  ED Vital Signs:  Vitals:    05/07/19 1449 05/07/19 1738 05/07/19 1859 05/07/19 1900   BP: 124/69 (!) 109/56  (!) 98/58   Pulse: 85 68 70    Resp: 18  20    Temp: 98 °F (36.7 °C)      TempSrc: Oral      SpO2: 99% 100% 100%    Weight: 66.7 kg (146 lb 15 oz)      Height: 5' 2" (1.575 m)          Abnormal Lab Results:  Labs Reviewed   COMPREHENSIVE METABOLIC PANEL - Abnormal; Notable for the following components:       Result Value    Chloride 113 (*)     CO2 20 (*)     Alkaline Phosphatase 50 (*)     ALT 9 (*)     All other components within normal limits   URINALYSIS, REFLEX TO URINE CULTURE - Abnormal; Notable for the following components:    Protein, UA Trace (*)     Ketones, UA Trace (*)     All other components within normal limits    Narrative:     Preferred Collection Type->Urine, Clean Catch   CBC W/ AUTO DIFFERENTIAL   PREGNANCY TEST, URINE RAPID   LIPASE        All Lab Results:  Results for orders placed or performed during the hospital encounter of 05/07/19   CBC auto differential   Result Value Ref Range    WBC 7.87 3.90 - 12.70 K/uL    RBC 4.46 4.00 - 5.40 M/uL    Hemoglobin 12.9 12.0 - 16.0 g/dL    Hematocrit 39.1 37.0 - 48.5 %    Mean Corpuscular Volume 88 82 - 98 fL    Mean Corpuscular Hemoglobin 28.9 27.0 - 31.0 pg    Mean Corpuscular Hemoglobin Conc 33.0 32.0 - 36.0 g/dL    RDW 13.8 11.5 - 14.5 %    Platelets 224 150 - 350 K/uL    MPV 11.1 9.2 - 12.9 fL    Gran # (ANC) 5.2 1.8 - 7.7 K/uL    Lymph # 2.0 1.0 - 4.8 K/uL    Mono # 0.6 0.3 - 1.0 K/uL    Eos # 0.1 0.0 - 0.5 K/uL    Baso # 0.03 0.00 - 0.20 " K/uL    Gran% 65.6 38.0 - 73.0 %    Lymph% 25.4 18.0 - 48.0 %    Mono% 7.5 4.0 - 15.0 %    Eosinophil% 1.1 0.0 - 8.0 %    Basophil% 0.4 0.0 - 1.9 %    Differential Method Automated    Comprehensive metabolic panel   Result Value Ref Range    Sodium 142 136 - 145 mmol/L    Potassium 3.9 3.5 - 5.1 mmol/L    Chloride 113 (H) 95 - 110 mmol/L    CO2 20 (L) 23 - 29 mmol/L    Glucose 84 70 - 110 mg/dL    BUN, Bld 12 6 - 20 mg/dL    Creatinine 0.8 0.5 - 1.4 mg/dL    Calcium 9.3 8.7 - 10.5 mg/dL    Total Protein 6.6 6.0 - 8.4 g/dL    Albumin 3.8 3.5 - 5.2 g/dL    Total Bilirubin 0.3 0.1 - 1.0 mg/dL    Alkaline Phosphatase 50 (L) 55 - 135 U/L    AST 16 10 - 40 U/L    ALT 9 (L) 10 - 44 U/L    Anion Gap 9 8 - 16 mmol/L    eGFR if African American >60 >60 mL/min/1.73 m^2    eGFR if non African American >60 >60 mL/min/1.73 m^2   Urinalysis, Reflex to Urine Culture Urine, Clean Catch   Result Value Ref Range    Specimen UA Urine, Clean Catch     Color, UA Yellow Yellow, Straw, [place]ola    Appearance, UA Clear Clear    pH, UA 8.0 5.0 - 8.0    Specific Gravity, UA 1.010 1.005 - 1.030    Protein, UA Trace (A) Negative    Glucose, UA Negative Negative    Ketones, UA Trace (A) Negative    Bilirubin (UA) Negative Negative    Occult Blood UA Negative Negative    Nitrite, UA Negative Negative    Urobilinogen, UA 1.0 <2.0 EU/dL    Leukocytes, UA Negative Negative   Pregnancy, urine rapid   Result Value Ref Range    Preg Test, Ur Negative    Lipase   Result Value Ref Range    Lipase 17 4 - 60 U/L         Imaging Results:  Imaging Results          CT Abdomen Pelvis  Without Contrast (Final result)  Result time 05/07/19 17:54:30    Final result by Bronson Bennett MD (05/07/19 17:54:30)                 Impression:      1. Negative for acute inflammatory process of the abdomen or pelvis.  2. Gastrostomy tube in place, unchanged compared to the previous exam.  3. Stable scarring left lower lobe.  All CT scans at this facility are performed   using dose modulation techniques as appropriate to performed exam including the following:  automated exposure control; adjustment of mA and/or kV according to the patients size (this includes techniques or standardized protocols for targeted exams where dose is matched to indication/reason for exam: i.e. extremities or head);  iterative reconstruction technique.      Electronically signed by: Bronson Bennett MD  Date:    05/07/2019  Time:    17:54             Narrative:    EXAMINATION:  CT ABDOMEN PELVIS WITHOUT CONTRAST    CLINICAL HISTORY:  Abdominal pain, unspecified;.    TECHNIQUE:  Low dose axial images, sagittal and coronal reformations were obtained from the lung bases to the pubic symphysis.    COMPARISON:  None    FINDINGS:  Mild atelectasis right lung base.  Previously identified nodular density subpleural lateral left lung base actually represents linear scarring and is unchanged.    The liver is normal.  Gallbladder is surgically absent.    The spleen is normal in size and appearance.  The pancreas is normal.  The adrenal glands are normal.  The aorta and IVC are normal.  No retroperitoneal adenopathy.    The left kidney is normal.  Left ureter is normal.  Right kidney is normal.  Right ureter is normal.    Gastrostomy tube in place.  Otherwise the stomach is unremarkable.  Postoperative changes small bowel identified left mid upper abdomen.  Negative for evidence of complicating process.  The remaining small bowel is normal in appearance.  A redundant right colon is identified with the cecum lying within the left lower abdomen/upper pelvis.  Appendix not definitively visualized.  No inflammatory changes to suggest acute appendicitis.  Remaining colon is normal.  The rectum is normal.    The pelvis demonstrates minimally distended normal-appearing bladder.  Uterus is unremarkable.  Adnexa normal.    Regional bones demonstrate postoperative changes lower lumbar spine.  Remaining bones are  unremarkable.  Abdominal and pelvic wall supporting soft tissues demonstrates diastasis of the midline anterior abdominal wall with protruding colonic loops.  No discrete hernia.  No change compared to the prior exam.                                        The Emergency Provider reviewed the vital signs and test results, which are outlined above.     ED Discussion     4:00 PM: Dr. Guerra transfers care of pt to Dr. Myrick, pending lab results.    4:39 PM: Dr. Myrick evaluated pt. Pt reports she is still nauseous. Pt states her last BM was 2 days ago.  D/w pt all pertinent results. D/w pt any concerns expressed at this time. Answered all questions. Pt expresses understanding at this time.    6:19 PM Reassessed pt at this time. Pt reports sxs have improved. Pt was given copy of CT report. Pt is awake, alert, and in NAD at this time. Discussed with pt all pertinent ED information and results. Discussed pt dx and plan of tx. Gave pt all f/u and return to the ED instructions. All questions and concerns were addressed at this time. Pt expresses understanding of information and instructions, and is comfortable with plan to discharge. Pt is stable for discharge.    I discussed with patient and/or family/caretaker that evaluation in the ED does not suggest any emergent or life threatening medical conditions requiring immediate intervention beyond what was provided in the ED, and I believe patient is safe for discharge.  Regardless, an unremarkable evaluation in the ED does not preclude the development or presence of a serious of life threatening condition. As such, patient was instructed to return immediately for any worsening or change in current symptoms.      ED Medication(s):  Medications   sodium chloride 0.9% bolus 1,000 mL (1,000 mLs Intravenous New Bag 5/7/19 1524)   ondansetron injection 4 mg (4 mg Intravenous Given 5/7/19 9935)   morphine injection 4 mg (4 mg Intravenous Given 5/7/19 0984)   promethazine injection  25 mg (25 mg Intramuscular Given 5/7/19 1659)       Discharge Medication List as of 5/7/2019  6:18 PM      START taking these medications    Details   promethazine (PHENERGAN) 25 MG suppository Place 1 suppository (25 mg total) rectally every 6 (six) hours as needed for Nausea., Starting Tue 5/7/2019, Print             Follow-up Information     Akanksha West MD In 1 day.    Specialty:  Internal Medicine  Contact information:  [address]  [address]  [place] [place] [facility] [address] [phone]             [facility] - .    Specialty:  Emergency Medicine  Why:  As needed, If symptoms worsen  Contact information:  [address]  [place] [facility] [address] [phone]                       Medical Decision Making:   Clinical Tests:   Lab Tests: Reviewed and Ordered  Radiological Study: Ordered and Reviewed             Scribe Attestation:   Scribe #1: I performed the above scribed service and the documentation accurately describes the services I performed. I attest to the accuracy of the note.     Attending:   Physician Attestation Statement for Scribe #1: I, Oscar Guerra MD, personally performed the services described in this documentation, as scribed by Candice Myrick, in my presence, and it is both accurate and complete.       Scribe Attestation:   Scribe #2: I performed the above scribed service and the documentation accurately describes the services I performed. I attest to the accuracy of the note.    Attending Attestation:           Physician Attestation for Scribe:    Physician Attestation Statement for Scribe #2: I, James Myrick MD, reviewed documentation, as scribed by Lia Ramsay in my presence, and it is both accurate and complete. I also acknowledge and confirm the content of the note done by Santosibmarni #1.           Clinical Impression       ICD-10-CM ICD-9-CM   1. Generalized abdominal pain R10.84 789.07       Disposition:   Disposition: Discharged  Condition:  Stable         Si FACUNDO Myrick MD  05/08/19 0059

## 2019-06-08 ENCOUNTER — HOSPITAL ENCOUNTER (EMERGENCY)
Facility: HOSPITAL | Age: 39
Discharge: HOME | End: 2019-06-08
Attending: EMERGENCY MEDICINE
Payer: COMMERCIAL

## 2019-06-08 ENCOUNTER — NURSE TRIAGE (OUTPATIENT)
Dept: ADMINISTRATIVE | Facility: CLINIC | Age: 39
End: 2019-06-08

## 2019-06-08 VITALS
WEIGHT: 147.38 LBS | DIASTOLIC BLOOD PRESSURE: 58 MMHG | OXYGEN SATURATION: 100 % | SYSTOLIC BLOOD PRESSURE: 110 MMHG | BODY MASS INDEX: 26.96 KG/M2 | TEMPERATURE: 99 F | RESPIRATION RATE: 18 BRPM | HEART RATE: 74 BPM

## 2019-06-08 DIAGNOSIS — R07.9 CHEST PAIN: ICD-10-CM

## 2019-06-08 DIAGNOSIS — K94.23 PEG TUBE MALFUNCTION: ICD-10-CM

## 2019-06-08 DIAGNOSIS — R11.2 NAUSEA AND VOMITING, INTRACTABILITY OF VOMITING NOT SPECIFIED, UNSPECIFIED VOMITING TYPE: Primary | ICD-10-CM

## 2019-06-08 LAB
ALBUMIN SERPL BCP-MCNC: 4 G/DL (ref 3.5–5.2)
ALP SERPL-CCNC: 60 U/L (ref 55–135)
ALT SERPL W/O P-5'-P-CCNC: 10 U/L (ref 10–44)
ANION GAP SERPL CALC-SCNC: 7 MMOL/L (ref 8–16)
AST SERPL-CCNC: 13 U/L (ref 10–40)
BASOPHILS # BLD AUTO: 0.03 K/UL (ref 0–0.2)
BASOPHILS NFR BLD: 0.4 % (ref 0–1.9)
BILIRUB SERPL-MCNC: 0.2 MG/DL (ref 0.1–1)
BUN SERPL-MCNC: 13 MG/DL (ref 6–20)
CALCIUM SERPL-MCNC: 9.4 MG/DL (ref 8.7–10.5)
CHLORIDE SERPL-SCNC: 110 MMOL/L (ref 95–110)
CO2 SERPL-SCNC: 24 MMOL/L (ref 23–29)
CREAT SERPL-MCNC: 0.8 MG/DL (ref 0.5–1.4)
DIFFERENTIAL METHOD: NORMAL
EOSINOPHIL # BLD AUTO: 0.1 K/UL (ref 0–0.5)
EOSINOPHIL NFR BLD: 0.7 % (ref 0–8)
ERYTHROCYTE [DISTWIDTH] IN BLOOD BY AUTOMATED COUNT: 14 % (ref 11.5–14.5)
EST. GFR  (AFRICAN AMERICAN): >60 ML/MIN/1.73 M^2
EST. GFR  (NON AFRICAN AMERICAN): >60 ML/MIN/1.73 M^2
GLUCOSE SERPL-MCNC: 76 MG/DL (ref 70–110)
HCG INTACT+B SERPL-ACNC: <1.2 MIU/ML
HCT VFR BLD AUTO: 39.7 % (ref 37–48.5)
HGB BLD-MCNC: 13.2 G/DL (ref 12–16)
LIPASE SERPL-CCNC: 18 U/L (ref 4–60)
LYMPHOCYTES # BLD AUTO: 1.8 K/UL (ref 1–4.8)
LYMPHOCYTES NFR BLD: 23.4 % (ref 18–48)
MAGNESIUM SERPL-MCNC: 2.2 MG/DL (ref 1.6–2.6)
MCH RBC QN AUTO: 29.1 PG (ref 27–31)
MCHC RBC AUTO-ENTMCNC: 33.2 G/DL (ref 32–36)
MCV RBC AUTO: 88 FL (ref 82–98)
MONOCYTES # BLD AUTO: 0.8 K/UL (ref 0.3–1)
MONOCYTES NFR BLD: 9.9 % (ref 4–15)
NEUTROPHILS # BLD AUTO: 5 K/UL (ref 1.8–7.7)
NEUTROPHILS NFR BLD: 65.6 % (ref 38–73)
PLATELET # BLD AUTO: 226 K/UL (ref 150–350)
PMV BLD AUTO: 10.7 FL (ref 9.2–12.9)
POTASSIUM SERPL-SCNC: 3.9 MMOL/L (ref 3.5–5.1)
PROT SERPL-MCNC: 6.9 G/DL (ref 6–8.4)
RBC # BLD AUTO: 4.53 M/UL (ref 4–5.4)
SODIUM SERPL-SCNC: 141 MMOL/L (ref 136–145)
WBC # BLD AUTO: 7.6 K/UL (ref 3.9–12.7)

## 2019-06-08 PROCEDURE — 93005 ELECTROCARDIOGRAM TRACING: CPT

## 2019-06-08 PROCEDURE — 83690 ASSAY OF LIPASE: CPT

## 2019-06-08 PROCEDURE — 93010 ELECTROCARDIOGRAM REPORT: CPT | Mod: ,,, | Performed by: INTERNAL MEDICINE

## 2019-06-08 PROCEDURE — 96361 HYDRATE IV INFUSION ADD-ON: CPT | Mod: 59

## 2019-06-08 PROCEDURE — 96372 THER/PROPH/DIAG INJ SC/IM: CPT | Mod: 59

## 2019-06-08 PROCEDURE — 43762 RPLC GTUBE NO REVJ TRC: CPT

## 2019-06-08 PROCEDURE — 36415 COLL VENOUS BLD VENIPUNCTURE: CPT

## 2019-06-08 PROCEDURE — 25000003 PHARM REV CODE 250: Performed by: EMERGENCY MEDICINE

## 2019-06-08 PROCEDURE — 99285 EMERGENCY DEPT VISIT HI MDM: CPT | Mod: 25

## 2019-06-08 PROCEDURE — 96375 TX/PRO/DX INJ NEW DRUG ADDON: CPT | Mod: 59

## 2019-06-08 PROCEDURE — 84702 CHORIONIC GONADOTROPIN TEST: CPT

## 2019-06-08 PROCEDURE — 85025 COMPLETE CBC W/AUTO DIFF WBC: CPT

## 2019-06-08 PROCEDURE — 83735 ASSAY OF MAGNESIUM: CPT

## 2019-06-08 PROCEDURE — 96374 THER/PROPH/DIAG INJ IV PUSH: CPT | Mod: 59

## 2019-06-08 PROCEDURE — 63600175 PHARM REV CODE 636 W HCPCS: Performed by: EMERGENCY MEDICINE

## 2019-06-08 PROCEDURE — 93010 EKG 12-LEAD: ICD-10-PCS | Mod: ,,, | Performed by: INTERNAL MEDICINE

## 2019-06-08 PROCEDURE — 80053 COMPREHEN METABOLIC PANEL: CPT

## 2019-06-08 RX ORDER — PROMETHAZINE HYDROCHLORIDE 25 MG/ML
25 INJECTION, SOLUTION INTRAMUSCULAR; INTRAVENOUS
Status: COMPLETED | OUTPATIENT
Start: 2019-06-08 | End: 2019-06-08

## 2019-06-08 RX ORDER — ONDANSETRON 2 MG/ML
8 INJECTION INTRAMUSCULAR; INTRAVENOUS
Status: COMPLETED | OUTPATIENT
Start: 2019-06-08 | End: 2019-06-08

## 2019-06-08 RX ORDER — MORPHINE SULFATE 4 MG/ML
4 INJECTION, SOLUTION INTRAMUSCULAR; INTRAVENOUS
Status: COMPLETED | OUTPATIENT
Start: 2019-06-08 | End: 2019-06-08

## 2019-06-08 RX ADMIN — ONDANSETRON 8 MG: 2 INJECTION INTRAMUSCULAR; INTRAVENOUS at 07:06

## 2019-06-08 RX ADMIN — MORPHINE SULFATE 4 MG: 4 INJECTION, SOLUTION INTRAMUSCULAR; INTRAVENOUS at 07:06

## 2019-06-08 RX ADMIN — PROMETHAZINE HYDROCHLORIDE 25 MG: 25 INJECTION INTRAMUSCULAR; INTRAVENOUS at 10:06

## 2019-06-08 RX ADMIN — SODIUM CHLORIDE 1000 ML: 0.9 INJECTION, SOLUTION INTRAVENOUS at 07:06

## 2019-06-08 NOTE — TELEPHONE ENCOUNTER
Reason for Disposition   [1] G-tube is broken or cracked AND [2] is not usable    Protocols used: FEEDING TUBE SYMPTOMS AND [id number]-CS    Feeding Catheter tube in use for a feeding tube,  for years placed.. 8 years now, recently changed 10 days ago, no replacement supplies. Pressure currently in stomach and unable to do feedings. Pressure and distention in abdomen. Patient advised to go ED for further evaluation.

## 2019-06-08 NOTE — ED PROVIDER NOTES
SCRIBE #1 NOTE: I, Roque Montano, am scribing for, and in the presence of, Reid Wells MD. I have scribed the entire note.       History     Chief Complaint   Patient presents with    Other     balloon on peg tube popped and pt can't use it for feeding or venting; pt also c/o vomiting and abdominal pain     Review of patient's allergies indicates:   Allergen Reactions    Sulfa (sulfonamide antibiotics)          History of Present Illness     HPI    6/8/2019, 6:57 PM  History obtained from the patient      History of Present Illness: Lia Avila is a 39 y.o. female patient with a PMHx of IBS, functional gastrointestinal disorder, EtOH dependence, who presents to the Emergency Department for evaluation of PEG tube balloon rupture which onset suddenly 2 days ago. Pt reports that she has been unable to use PEG tube for feeding or venting for 2 days. Pt also reports that she has not slept in 2 days for fear of her PEG tube falling out in her sleep. Symptoms are constant and moderate in severity. No mitigating or exacerbating factors reported. Associated sxs include N/V, abd distention, abd pain. Patient denies any fever, chills, constipation, hematochezia, dysuria, hematuria, urinary frequency, diarrhea and all other sxs at this time. No prior Tx reported. Pt reports PEG tube placement 8 years ago. No further complaints or concerns at this time.         Arrival mode: Personal vehicle    PCP: Akanksha West MD        Past Medical History:  Past Medical History:   Diagnosis Date    Acid reflux     Aerophagia     Alcoholic     recovering    Anxiety     Depression     Functional gastrointestinal disorder     IBS (irritable bowel syndrome)     Irritable bowel syndrome     Sleep apnea     Trivial aortic valve anomaly        Past Surgical History:  Past Surgical History:   Procedure Laterality Date    BACK SURGERY      fusion of L4, L5, and S1    CHEST TUBE PLACEMENT Left 1/16/2017    Performed by  Louis O. Jeansonne IV, MD at [facility] OR    CHOLECYSTECTOMY      COLON SURGERY      COLONOSCOPY      Colonoscopy N/A 4/13/2018    Performed by Apurva Salinas MD at [facility] ENDO (4TH FLR)    ESOPHAGOGASTRODUODENOSCOPY (EGD) N/A 4/13/2018    Performed by Apurva Salinas MD at [facility] ENDO (4TH FLR)    ESOPHAGOGASTRODUODENOSCOPY (EGD) N/A 3/28/2017    [facility]    GASTROSTOMY-JEJEUNOSTOMY TUBE CHANGE/PLACEMENT      LAPAROSCOPY-DIAGNOSTIC N/A 1/3/2017    Performed by Louis O. Je[facility]ne IV, MD at [facility] OR    [id number]-[id number] N/A 1/3/2017    Performed by Louis O. Jeansonne IV, MD at [facility] OR    MANOMETRY, ESOPHAGUS, WITH IMPEDANCE MEASUREMENT N/A 10/8/2018    Performed by Apurva Salinas MD at [facility] ENDO (4TH FLR)    MANOMETRY-ANORECTAL N/A 4/3/2018    Performed by Apurva Salinas MD at [facility] ENDO (4TH FLR)    RESECTION - SMALL BOWEL N/A 1/3/2017    Performed by Louis O. Jeansonne IV, MD at [facility] OR    SMALL INTESTINE SURGERY      TRANSESOPHAGEAL ECHOCARDIOGRAM (KARMEN) N/A 4/27/2018    Performed by Ward Ricks MD at [facility] CATH LAB    UPPER GASTROINTESTINAL ENDOSCOPY           Family History:  Family History   Problem Relation Age of Onset    Hypertension Mother     Cancer Father 49        stomach CA    Stomach cancer Father 49    Liver cancer Father     Rectal cancer Maternal Grandmother         dx in 70s    Celiac disease Neg Hx     Cirrhosis Neg Hx     Colon cancer Neg Hx     Colon polyps Neg Hx     Crohn's disease Neg Hx     Cystic fibrosis Neg Hx     Esophageal cancer Neg Hx     Hemochromatosis Neg Hx     Inflammatory bowel disease Neg Hx     Irritable bowel syndrome Neg Hx     Liver disease Neg Hx     Ulcerative colitis Neg Hx     Dain's disease Neg Hx     Lymphoma Neg Hx     Tuberculosis Neg Hx     Scleroderma Neg Hx     Rheum arthritis Neg Hx     Multiple sclerosis Neg Hx     Melanoma Neg Hx     Lupus Neg Hx     Psoriasis Neg Hx     Skin  cancer Neg Hx        Social History:  Social History     Tobacco Use    Smoking status: Smoker, Current Status Unknown     Packs/day: 0.25     Types: Cigarettes     Last attempt to quit: 12/3/2016     Years since quittin.5    Smokeless tobacco: Never Used   Substance and Sexual Activity    Alcohol use: No     Comment: pt states that she is a recoveirng alcoholic, last drink 11    Drug use: Yes     Types: Marijuana    Sexual activity: unknown        Review of Systems     Review of Systems   Constitutional: Negative for chills and fever.   HENT: Negative for sore throat.    Respiratory: Negative for shortness of breath.    Cardiovascular: Negative for chest pain.   Gastrointestinal: Positive for abdominal distention, abdominal pain, nausea and vomiting. Negative for blood in stool, constipation and diarrhea.        PEG to malfunction   Genitourinary: Negative for dysuria, frequency and hematuria.   Musculoskeletal: Negative for back pain.   Skin: Negative for rash.   Neurological: Negative for weakness.   Hematological: Does not bruise/bleed easily.   All other systems reviewed and are negative.     Physical Exam     Initial Vitals [19 1810]   BP Pulse Resp Temp SpO2   (!) 142/68 83 16 98.8 °F (37.1 °C) 98 %      MAP       --          Physical Exam  Nursing Notes and Vital Signs Reviewed.  Constitutional: Patient is in no acute distress. Well-developed and well-nourished.  Head: Atraumatic. Normocephalic.  Eyes: PERRL. EOM intact. Conjunctivae are not pale. No scleral icterus.  ENT: Mucous membranes are moist. Oropharynx is clear and symmetric.    Neck: Supple. Full ROM. No lymphadenopathy.  Cardiovascular: Regular rate. Regular rhythm. No murmurs, rubs, or gallops. Distal pulses are 2+ and symmetric.  Pulmonary/Chest: No respiratory distress. Clear to auscultation bilaterally. No wheezing or rales.  Abdominal: Soft and non-distended.  There is no tenderness.  No rebound, guarding, or rigidity. PEG  tube in place.  Genitourinary: No CVA tenderness  Musculoskeletal: Moves all extremities. No obvious deformities. No edema. No calf tenderness.  Skin: Warm and dry.  Neurological:  Alert, awake, and appropriate.  Normal speech.  No acute focal neurological deficits are appreciated.  Psychiatric: Normal affect. Good eye contact. Appropriate in content.     ED Course   Feeding Tube  Date/Time: 6/8/2019 8:19 PM  Performed by: Reid Wells MD  Authorized by: Reid Wells MD   Consent: Verbal consent obtained.  Risks and benefits: risks, benefits and alternatives were discussed  Consent given by: patient  Patient understanding: patient states understanding of the procedure being performed  Patient consent: the patient's understanding of the procedure matches consent given  Relevant documents: relevant documents present and verified  Patient identity confirmed: verbally with patient  Indications: tube malfunction  Preparation: Patient was prepped and draped in the usual sterile fashion.    Sedation:  Patient sedated: no    Tube type: gastrostomy  Patient position: supine  Procedure type: replacement  Tube size: 18 Fr  Bulb inflation volume: 15 (ml)  Bulb inflation fluid: sterile water  Placement/position confirmation: x-ray  Complications: No  Patient tolerance: Patient tolerated the procedure well with no immediate complications        ED Vital Signs:  Vitals:    06/08/19 1810 06/08/19 1900 06/08/19 1915 06/08/19 2000   BP: (!) 142/68 116/60  (!) 118/56   Pulse: 83 84 85 68   Resp: 16   20   Temp: 98.8 °F (37.1 °C)      TempSrc: Oral      SpO2: 98% 100%  100%   Weight: 66.8 kg (147 lb 6 oz)       06/08/19 2100 06/08/19 2200   BP: 123/60 (!) 118/53   Pulse: 71 78   Resp: 13 20   Temp:     TempSrc:     SpO2: 100%    Weight:         Abnormal Lab Results:  Labs Reviewed   COMPREHENSIVE METABOLIC PANEL - Abnormal; Notable for the following components:       Result Value    Anion Gap 7 (*)     All other components within  normal limits   CBC W/ AUTO DIFFERENTIAL   LIPASE   MAGNESIUM   HCG, QUANTITATIVE, PREGNANCY   HCG, QUANTITATIVE, PREGNANCY        All Lab Results:  Results for orders placed or performed during the hospital encounter of 06/08/19   CBC auto differential   Result Value Ref Range    WBC 7.60 3.90 - 12.70 K/uL    RBC 4.53 4.00 - 5.40 M/uL    Hemoglobin 13.2 12.0 - 16.0 g/dL    Hematocrit 39.7 37.0 - 48.5 %    Mean Corpuscular Volume 88 82 - 98 fL    Mean Corpuscular Hemoglobin 29.1 27.0 - 31.0 pg    Mean Corpuscular Hemoglobin Conc 33.2 32.0 - 36.0 g/dL    RDW 14.0 11.5 - 14.5 %    Platelets 226 150 - 350 K/uL    MPV 10.7 9.2 - 12.9 fL    Gran # (ANC) 5.0 1.8 - 7.7 K/uL    Lymph # 1.8 1.0 - 4.8 K/uL    Mono # 0.8 0.3 - 1.0 K/uL    Eos # 0.1 0.0 - 0.5 K/uL    Baso # 0.03 0.00 - 0.20 K/uL    Gran% 65.6 38.0 - 73.0 %    Lymph% 23.4 18.0 - 48.0 %    Mono% 9.9 4.0 - 15.0 %    Eosinophil% 0.7 0.0 - 8.0 %    Basophil% 0.4 0.0 - 1.9 %    Differential Method Automated    Comprehensive metabolic panel   Result Value Ref Range    Sodium 141 136 - 145 mmol/L    Potassium 3.9 3.5 - 5.1 mmol/L    Chloride 110 95 - 110 mmol/L    CO2 24 23 - 29 mmol/L    Glucose 76 70 - 110 mg/dL    BUN, Bld 13 6 - 20 mg/dL    Creatinine 0.8 0.5 - 1.4 mg/dL    Calcium 9.4 8.7 - 10.5 mg/dL    Total Protein 6.9 6.0 - 8.4 g/dL    Albumin 4.0 3.5 - 5.2 g/dL    Total Bilirubin 0.2 0.1 - 1.0 mg/dL    Alkaline Phosphatase 60 55 - 135 U/L    AST 13 10 - 40 U/L    ALT 10 10 - 44 U/L    Anion Gap 7 (L) 8 - 16 mmol/L    eGFR if African American >60 >60 mL/min/1.73 m^2    eGFR if non African American >60 >60 mL/min/1.73 m^2   Lipase   Result Value Ref Range    Lipase 18 4 - 60 U/L   Magnesium   Result Value Ref Range    Magnesium 2.2 1.6 - 2.6 mg/dL   hCG, quantitative   Result Value Ref Range    hCG Quant <1.2 See Text mIU/mL         Imaging Results:  Imaging Results          XR Non-Rad Performed NG/Gastric Tube Check (Final result)  Result time 06/08/19  22:09:23   Procedure changed from FL Naso Gastric Tube Placement     Final result by Jerry Chi MD (06/08/19 22:09:23)                 Impression:      1.  Negative for extravasation of contrast with injection through a PEG tube.  Contrast remains in the stomach.    2.  Stable findings as noted above.      Electronically signed by: Jerry Chi MD  Date:    06/08/2019  Time:    22:09             Narrative:    EXAMINATION:  XR NON-RADIOLOGIST PERFORMED NG/GASTRIC TUBE CHECK    CLINICAL HISTORY:  s/p peg tube placement;    COMPARISON:  Comparisons are made to April 17, 2019.    FLUORO TIME:  0 seconds.  One image was obtained.    FINDINGS:  A PEG tube is in place.  Contrast is injected into the PEG tube and flows into the stomach.  There is no extravasation of contrast.    The appearance of the abdomen is similar to the comparison study.  Normal bowel gas pattern.  Stable marked degenerative changes of the lower lumbar spine and sacroiliac joints/sacrum.  Osseous structures are unchanged.                                 The EKG was ordered, reviewed, and independently interpreted by the ED provider.  Interpretation time: 8:02 PM  Rate: 68 BPM  Rhythm: normal sinus rhythm  Interpretation: Normal axis. Normal ST-T segments. No STEMI.         The Emergency Provider reviewed the vital signs and test results, which are outlined above.     ED Discussion     Re-exam:  Patient informed nurse that she was having some chest pain.  Nurse ordered EKG.  I went and spoke with patient.  Patient states that she was having some temporary stabbing chest pain.  She states she gets this pain all the time.  Pain has resolved by itself.  Pain normally lasts for less than 1 min.  Denies any shortness of breath, diaphoresis, or any cardiac history.  EKG shows no evidence of ischemia.  Do not suspect ACS.    10:28 PM: Reassessed pt at this time.  Pt states her condition has improved at this time. Discussed with pt all pertinent ED  information and results. Discussed pt dx and plan of tx. Gave pt all f/u and return to the ED instructions. All questions and concerns were addressed at this time. Pt expresses understanding of information and instructions, and is comfortable with plan to discharge. Pt is stable for discharge.    I discussed with patient and/or family/caretaker that evaluation in the ED does not suggest any emergent or life threatening medical conditions requiring immediate intervention beyond what was provided in the ED, and I believe patient is safe for discharge.  Regardless, an unremarkable evaluation in the ED does not preclude the development or presence of a serious of life threatening condition. As such, patient was instructed to return immediately for any worsening or change in current symptoms.        ED Medication(s):  Medications   sodium chloride 0.9% bolus 1,000 mL (0 mLs Intravenous Stopped 6/8/19 2209)   ondansetron injection 8 mg (8 mg Intravenous Given 6/8/19 1913)   morphine injection 4 mg (4 mg Intravenous Given 6/8/19 1913)       New Prescriptions    No medications on file                 Medical Decision Making:   Clinical Tests:   Lab Tests: Reviewed and Ordered  Radiological Study: Reviewed and Ordered  Medical Tests: Ordered and Reviewed  39-year-old female with chronic abdominal pain and nausea/vomiting with PEG to presenting with complaints of chronic abdominal pain with nausea/vomiting and rupture of the PEG tube wound; PEG tube replaced at bedside and confirmed with x-ray; nausea treated and patient discharged home stable condition with GI follow-up     Additional MDM:   Smoking Cessation: The patient is a smoker. The patient was counseled on smoking cessation for: 4 minutes. The patient was counseled on tobacco related  health complications. Appropriate patient literature was given to the patient concerning tobacco cessation.          Scribe Attestation:   Scribe #1: I performed the above scribed  service and the documentation accurately describes the services I performed. I attest to the accuracy of the note.     Attending:   Physician Attestation Statement for Scribe #1: I, Reid Wells MD, personally performed the services described in this documentation, as scribed by Roque Montano, in my presence, and it is both accurate and complete.           Clinical Impression      Diagnoses.  1. nausea and vomiting, intractability of vomiting unspecified, unspecified vomiting type  2.  Peg tube malfunction    Disposition:   Disposition: Discharged  Condition: Stable         Reid Wells MD  06/08/19 [id number]

## 2019-06-09 NOTE — ED PROVIDER NOTES
Encounter Date: 6/8/2019       History     Chief Complaint   Patient presents with    Other     balloon on peg tube popped and pt can't use it for feeding or venting; pt also c/o vomiting and abdominal pain     HPI  Review of patient's allergies indicates:   Allergen Reactions    Sulfa (sulfonamide antibiotics)      Past Medical History:   Diagnosis Date    Acid reflux     Aerophagia     Alcoholic     recovering    Anxiety     Depression     Functional gastrointestinal disorder     IBS (irritable bowel syndrome)     Irritable bowel syndrome     Sleep apnea     Trivial aortic valve anomaly      Past Surgical History:   Procedure Laterality Date    BACK SURGERY      fusion of L4, L5, and S1    CHEST TUBE PLACEMENT Left 1/16/2017    Performed by Louis O. [facility]ne IV, MD at [facility] OR    CHOLECYSTECTOMY      COLON SURGERY      COLONOSCOPY      Colonoscopy N/A 4/13/2018    Performed by [facility]inas MD at [facility] ENDO (4TH FLR)    ESOPHAGOGASTRODUODENOSCOPY (EGD) N/A 4/13/2018    Performed by Apurva Salinas MD at [facility] ENDO (4TH FLR)    ESOPHAGOGASTRODUODENOSCOPY (EGD) N/A 3/28/2017    Performed by Pop Schwab MD at [facility] ENDO    GASTROSTOMY-JEJEUNOSTOMY TUBE CHANGE/PLACEMENT      LAPAROSCOPY-DIAGNOSTIC N/A 1/3/2017    Performed by Louis O. Jeansonne IV, MD at [facility] OR    [id number] N/A 1/3/2017    Performed by Louis O. Jeansonne IV, MD at [facility] OR    MANOMETRY, ESOPHAGUS, WITH IMPEDANCE MEASUREMENT N/A 10/8/2018    Performed by Apurva Salinas MD at [facility] ENDO (4TH FLR)    MANOMETRY-ANORECTAL N/A 4/3/2018    Performed by Apurva Salinas MD at [facility] ENDO (4TH FLR)    RESECTION - SMALL BOWEL N/A 1/3/2017    Performed by Louis O. Je[facility]ne IV, MD at [facility] OR    SMALL INTESTINE SURGERY      TRANSESOPHAGEAL ECHOCARDIOGRAM (KARMEN) N/A 4/27/2018    Performed by Ward Ricks MD at [facility] CATH LAB    UPPER GASTROINTESTINAL ENDOSCOPY       Family History   Problem Relation Age of Onset     Hypertension Mother     Cancer Father 49        stomach CA    Stomach cancer Father 49    Liver cancer Father     Rectal cancer Maternal Grandmother         dx in 70s    Celiac disease Neg Hx     Cirrhosis Neg Hx     Colon cancer Neg Hx     Colon polyps Neg Hx     Crohn's disease Neg Hx     Cystic fibrosis Neg Hx     Esophageal cancer Neg Hx     Hemochromatosis Neg Hx     Inflammatory bowel disease Neg Hx     Irritable bowel syndrome Neg Hx     Liver disease Neg Hx     Ulcerative colitis Neg Hx     Dain's disease Neg Hx     Lymphoma Neg Hx     Tuberculosis Neg Hx     Scleroderma Neg Hx     Rheum arthritis Neg Hx     Multiple sclerosis Neg Hx     Melanoma Neg Hx     Lupus Neg Hx     Psoriasis Neg Hx     Skin cancer Neg Hx      Social History     Tobacco Use    Smoking status: Smoker, Current Status Unknown     Packs/day: 0.25     Types: Cigarettes     Last attempt to quit: 12/3/2016     Years since quittin.5    Smokeless tobacco: Never Used   Substance Use Topics    Alcohol use: No     Comment: pt states that she is a recoveirng alcoholic, last drink 11    Drug use: Yes     Types: Marijuana     Review of Systems    Physical Exam     Initial Vitals [19 1810]   BP Pulse Resp Temp SpO2   (!) 142/68 83 16 98.8 °F (37.1 °C) 98 %      MAP       --         Physical Exam    ED Course   Procedures  Labs Reviewed   COMPREHENSIVE METABOLIC PANEL - Abnormal; Notable for the following components:       Result Value    Anion Gap 7 (*)     All other components within normal limits   CBC W/ AUTO DIFFERENTIAL   LIPASE   MAGNESIUM   HCG, QUANTITATIVE, PREGNANCY   HCG, QUANTITATIVE, PREGNANCY          Imaging Results          XR Non-Rad Performed NG/Gastric Tube Check (Final result)  Result time 19 22:09:23   Procedure changed from FL Naso Gastric Tube Placement     Final result by Jerry Chi MD (19 22:09:23)                 Impression:      1.  Negative for extravasation  of contrast with injection through a PEG tube.  Contrast remains in the stomach.    2.  Stable findings as noted above.      Electronically signed by: Jerry Chi MD  Date:    06/08/2019  Time:    22:09             Narrative:    EXAMINATION:  XR NON-RADIOLOGIST PERFORMED NG/GASTRIC TUBE CHECK    CLINICAL HISTORY:  s/p peg tube placement;    COMPARISON:  Comparisons are made to April 17, 2019.    FLUORO TIME:  0 seconds.  One image was obtained.    FINDINGS:  A PEG tube is in place.  Contrast is injected into the PEG tube and flows into the stomach.  There is no extravasation of contrast.    The appearance of the abdomen is similar to the comparison study.  Normal bowel gas pattern.  Stable marked degenerative changes of the lower lumbar spine and sacroiliac joints/sacrum.  Osseous structures are unchanged.                                                      Clinical Impression:   {Add your Clinical Impression here. If you haven't documented one yet, please pend the note, finalize a Clinical Impression, and refresh your note before signing.:[id number]}

## 2019-06-12 ENCOUNTER — OFFICE VISIT (OUTPATIENT)
Dept: CARDIOLOGY | Facility: CLINIC | Age: 39
End: 2019-06-12
Payer: COMMERCIAL

## 2019-06-12 VITALS
HEART RATE: 72 BPM | HEIGHT: 62 IN | BODY MASS INDEX: 27.06 KG/M2 | WEIGHT: 147.06 LBS | DIASTOLIC BLOOD PRESSURE: 62 MMHG | SYSTOLIC BLOOD PRESSURE: 112 MMHG

## 2019-06-12 DIAGNOSIS — R07.89 ATYPICAL CHEST PAIN: ICD-10-CM

## 2019-06-12 DIAGNOSIS — I25.3 PFO WITH ATRIAL SEPTAL ANEURYSM: Primary | ICD-10-CM

## 2019-06-12 DIAGNOSIS — Q21.12 PFO WITH ATRIAL SEPTAL ANEURYSM: Primary | ICD-10-CM

## 2019-06-12 DIAGNOSIS — Z72.0 TOBACCO ABUSE: ICD-10-CM

## 2019-06-12 PROBLEM — R94.31 ABNORMAL ECG: Status: RESOLVED | Noted: 2019-06-12 | Resolved: 2019-06-12

## 2019-06-12 PROBLEM — R94.31 ABNORMAL ECG: Status: ACTIVE | Noted: 2019-06-12

## 2019-06-12 PROCEDURE — 99999 PR PBB SHADOW E&M-EST. PATIENT-LVL III: CPT | Mod: PBBFAC,,, | Performed by: INTERNAL MEDICINE

## 2019-06-12 PROCEDURE — 3008F BODY MASS INDEX DOCD: CPT | Mod: CPTII,S$GLB,, | Performed by: INTERNAL MEDICINE

## 2019-06-12 PROCEDURE — 99214 OFFICE O/P EST MOD 30 MIN: CPT | Mod: S$GLB,,, | Performed by: INTERNAL MEDICINE

## 2019-06-12 PROCEDURE — 3008F PR BODY MASS INDEX (BMI) DOCUMENTED: ICD-10-PCS | Mod: CPTII,S$GLB,, | Performed by: INTERNAL MEDICINE

## 2019-06-12 PROCEDURE — 99214 PR OFFICE/OUTPT VISIT, EST, LEVL IV, 30-39 MIN: ICD-10-PCS | Mod: S$GLB,,, | Performed by: INTERNAL MEDICINE

## 2019-06-12 PROCEDURE — 99999 PR PBB SHADOW E&M-EST. PATIENT-LVL III: ICD-10-PCS | Mod: PBBFAC,,, | Performed by: INTERNAL MEDICINE

## 2019-06-12 NOTE — PROGRESS NOTES
Subjective:    Patient ID:  Lia Avila is a 39 y.o. female who presents for evaluation of Chest Pain; Risk Factor Management For Atherosclerosis; and Hyperlipidemia        HPI Pt presents for f/u.  H/o insulin resistance, hyperlipidemia, PFO, atrial septal aneurysm, tobacco abuse. Former alcoholic.  Past hx pertinent for following:  H/o numerous GI problems, documented in epic chart, prolonged hospitalization last year, has g tube; ongoing gastric workup..  ecg today is normal.  ecgs in past are normal, or unremarkable.  She has h/o chronic atypical cp sxs.    - stress test April 2018.   Echo 2018 suggested atrial septal shunt therefore KARMEN scheduled.  S/p KARMEN with Dr. Ricks 4/18 and found to have PFO, atrial septal aneurysm.  Now here.  Last seen a year ago.   ecg 6/8/19 is normal.   ecg 8/10/18 is normal.   She was seen in [facility] ER last weekend for PEG tube balloon issues and nausea, abd discomfort.  Per ER doctor notes, she c/o atypical cp and nurse got ECG which was normal.   ER staff physician did not thinks sxs were cardiac.  Labs, etc were stable and she was d/c home.  Chart reviewed.  She has chronic issues, GI, abd pain, nausea/vomiting, and continued chronic chest pains that are atypical for cardiac in origin.  She was seen 5/13/19 [facility] ER and had some sort of suicidal thoughts, marijuana abuse.  ecg borderline abnl with mild sinus eloise, incomplete right bundle delay.  Per Care Everywhere:    IMPRESSION  Pt is a 38 yo WF who presents with SI and complaints of seizure like activity related to THC use. BAL negative. UDS pending. Pt endorsing SI associated with self-harm behaviors such as punching and choking herself. Reports she has thoughts of cutting her stomach. Denies HI/AVH/paranoia. Her speech is hyperverbose and pressured. Thought process disorganized and tangential with bizarre content. Pt is under the impression that her and a friend are transferring energy back in and forth resulting  in these seizures. She has not been sleeping or eating. Noncompliant with medications. Pt appears gravely disabled at this time and at risk of imminent harm to self. Per Dr. Leblanc, this is far from her baseline. Will PEC and pursue inpt admission for stabilization and safety.     DIAGNOSES  Active Problems:  Cannabis abuse  Pseudoseizures  Substance-induced psychotic disorder with delusions (HCC)    PLAN  1) PEC and pursue inpt admission   2) Appropriate PRNS  3) ED staff to address medical concerns    I believe the above interventions will reasonably improve the patient's condition (s).    Jacinto Davidson MD 5/14/2019 12:08 AM        Past Medical History:   Diagnosis Date    Acid reflux     Aerophagia     Alcoholic     recovering    Anxiety     Depression     Functional gastrointestinal disorder     Hyperlipidemia     IBS (irritable bowel syndrome)     Irritable bowel syndrome     Sleep apnea     Trivial aortic valve anomaly        Current Outpatient Medications:     atorvastatin (LIPITOR) 20 MG tablet, 20 mg once daily. , Disp: , Rfl: 1    cholecalciferol, vitamin D3, (VITAMIN D3) 2,000 unit Cap, Take 1 capsule by mouth., Disp: , Rfl:     dextroamphetamine-amphetamine (ADDERALL) 20 mg tablet, Take 1.5 tabs every morning, 1 tab 4-5 hours after that and another half tab later each day., Disp: , Rfl:     diazePAM (VALIUM) 10 MG Tab, Take 10 mg by mouth as needed., Disp: , Rfl:     duloxetine (CYMBALTA) 60 MG capsule, Take 120 mg by mouth once daily. , Disp: , Rfl:     esomeprazole (NEXIUM) 40 MG capsule, Take 1 capsule (40 mg total) by mouth 2 (two) times daily before meals., Disp: 60 capsule, Rfl: 6    HYDROcodone-acetaminophen (NORCO)  mg per tablet, Take 1 tablet by mouth every 4 (four) hours as needed for Pain., Disp: 12 tablet, Rfl: 0    LINZESS 290 mcg Cap, , Disp: , Rfl:     metformin (GLUCOPHAGE) 500 MG tablet, Take 500 mg by mouth 2 (two) times daily with meals. , Disp: , Rfl:  "    methylnaltrexone (RELISTOR) 8 mg/0.4 mL Syrg, Inject 8 mg into the skin every 48 hours. PRN, Disp: , Rfl:     mirtazapine (REMERON) 7.5 MG Tab, Take 15 mg by mouth every evening. , Disp: , Rfl:     ondansetron (ZOFRAN-ODT) 4 MG TbDL, Take 2 tablets (8 mg total) by mouth every 8 (eight) hours as needed., Disp: 60 tablet, Rfl: 6    topiramate (TOPAMAX) 100 MG tablet, Take 100 mg by mouth., Disp: , Rfl:     traZODone (DESYREL) 150 MG tablet, Take 150 mg by mouth., Disp: , Rfl:     ferrous gluconate (FERGON) 324 MG tablet, Take 324 mg by mouth., Disp: , Rfl:     metoclopramide HCl (REGLAN) 10 MG tablet, Take 1 tablet (10 mg total) by mouth every 6 (six) hours., Disp: 30 tablet, Rfl: 0    mupirocin (BACTROBAN) 2 % ointment, Apply topically 2 (two) times daily. Apply small amount to abdominal wall wound after gently cleaning area with soap and water, Disp: 22 g, Rfl: 0    promethazine (PHENERGAN) 25 MG suppository, Place 1 suppository (25 mg total) rectally every 6 (six) hours as needed for Nausea., Disp: 16 suppository, Rfl: 0      Review of Systems   Constitution: Positive for malaise/fatigue.   HENT: Negative.    Eyes: Negative.    Cardiovascular: Positive for chest pain.   Respiratory: Negative.    Endocrine: Negative.    Hematologic/Lymphatic: Negative.    Skin: Negative.    Musculoskeletal: Negative.    Gastrointestinal: Positive for bloating, abdominal pain, change in bowel habit, diarrhea, nausea and vomiting.   Genitourinary: Negative.    Neurological: Negative.    Psychiatric/Behavioral: Positive for depression. The patient is nervous/anxious.    Allergic/Immunologic: Negative.        /62   Pulse 72   Ht 5' 2" (1.575 m)   Wt 66.7 kg (147 lb 0.8 oz)   BMI 26.90 kg/m²     Wt Readings from Last 3 Encounters:   06/12/19 66.7 kg (147 lb 0.8 oz)   06/08/19 66.8 kg (147 lb 6 oz)   05/07/19 66.7 kg (146 lb 15 oz)     Temp Readings from Last 3 Encounters:   06/08/19 98.6 °F (37 °C) (Oral) "   05/07/19 98 °F (36.7 °C) (Oral)   04/17/19 98.5 °F (36.9 °C) (Oral)     BP Readings from Last 3 Encounters:   06/12/19 112/62   06/08/19 (!) 110/58   05/07/19 (!) 98/58     Pulse Readings from Last 3 Encounters:   06/12/19 72   06/08/19 74   05/07/19 70          Objective:    Physical Exam   Constitutional: She is oriented to person, place, and time. Vital signs are normal. She appears well-developed and well-nourished. She is active and cooperative. She does not have a sickly appearance. She does not appear ill. No distress.   HENT:   Head: Normocephalic.   Neck: Neck supple. Normal carotid pulses, no hepatojugular reflux and no JVD present. Carotid bruit is not present. No thyromegaly present.   Cardiovascular: Normal rate, regular rhythm, S1 normal, S2 normal, normal heart sounds and normal pulses. PMI is not displaced. Exam reveals no gallop and no friction rub.   No murmur heard.  Pulses:       Radial pulses are 2+ on the right side, and 2+ on the left side.   Pulmonary/Chest: Effort normal and breath sounds normal. She has no wheezes. She has no rales.   Abdominal: Soft. Normal appearance and bowel sounds are normal. She exhibits no abdominal bruit.   Musculoskeletal: She exhibits no edema.   Lymphadenopathy:     She has no cervical adenopathy.   Neurological: She is alert and oriented to person, place, and time.   Skin: Skin is warm. She is not diaphoretic.   Psychiatric: She has a normal mood and affect. Her behavior is normal.   Nursing note and vitals reviewed.      I have reviewed all pertinent labs and cardiac studies.      Chemistry        Component Value Date/Time     06/08/2019 1919    K 3.9 06/08/2019 1919     06/08/2019 1919    CO2 24 06/08/2019 1919    BUN 13 06/08/2019 1919    CREATININE 0.8 06/08/2019 1919    GLU 76 06/08/2019 1919        Component Value Date/Time    CALCIUM 9.4 06/08/2019 1919    ALKPHOS 60 06/08/2019 1919    AST 13 06/08/2019 1919    ALT 10 06/08/2019 1919     BILITOT 0.2 06/08/2019 1919    ESTGFRAFRICA >60 06/08/2019 1919    EGFRNONAA >60 06/08/2019 1919        Lab Results   Component Value Date    WBC 7.60 06/08/2019    HGB 13.2 06/08/2019    HCT 39.7 06/08/2019    MCV 88 06/08/2019     06/08/2019     Lab Results   Component Value Date    HGBA1C 4.6 01/02/2017     No results found for: CHOL  No results found for: HDL  No results found for: LDLCALC  Lab Results   Component Value Date    TRIG 106 01/10/2017     No results found for: CHOLHDL        Assessment:       1. PFO with atrial septal aneurysm    2. Atypical chest pain    3. Tobacco abuse         Plan:             Chronic atypical cp sxs with negative stress test last year.  Likely non-cardiac in origin with her chronic GI sxs and psych history.  Incidental finding of atrial septal aneurysm/PFO last year on echo and pt has been counseled on her conditions.  She needs to quit smoking and continue to refrain from smoking (sober for long time per pt).  F/u with pyschiatry.  Reassurance.  F/u with Cards mid level 1 - 2 years.

## 2019-07-11 ENCOUNTER — HOSPITAL ENCOUNTER (EMERGENCY)
Facility: HOSPITAL | Age: 39
Discharge: HOME | End: 2019-07-11
Attending: FAMILY MEDICINE
Payer: COMMERCIAL

## 2019-07-11 VITALS
WEIGHT: 139.69 LBS | RESPIRATION RATE: 20 BRPM | DIASTOLIC BLOOD PRESSURE: 57 MMHG | HEART RATE: 70 BPM | OXYGEN SATURATION: 100 % | SYSTOLIC BLOOD PRESSURE: 108 MMHG | TEMPERATURE: 99 F | HEIGHT: 62 IN | BODY MASS INDEX: 25.7 KG/M2

## 2019-07-11 DIAGNOSIS — R10.9 CHRONIC ABDOMINAL PAIN: Primary | ICD-10-CM

## 2019-07-11 DIAGNOSIS — R10.9 ABDOMINAL PAIN: ICD-10-CM

## 2019-07-11 DIAGNOSIS — G89.29 CHRONIC ABDOMINAL PAIN: Primary | ICD-10-CM

## 2019-07-11 LAB
ALBUMIN SERPL BCP-MCNC: 4.4 G/DL (ref 3.5–5.2)
ALP SERPL-CCNC: 67 U/L (ref 55–135)
ALT SERPL W/O P-5'-P-CCNC: 22 U/L (ref 10–44)
ANION GAP SERPL CALC-SCNC: 13 MMOL/L (ref 8–16)
APTT BLDCRRT: 31.3 SEC (ref 21–32)
AST SERPL-CCNC: 24 U/L (ref 10–40)
B-HCG UR QL: NEGATIVE
BASOPHILS # BLD AUTO: 0.01 K/UL (ref 0–0.2)
BASOPHILS NFR BLD: 0.2 % (ref 0–1.9)
BILIRUB SERPL-MCNC: 0.3 MG/DL (ref 0.1–1)
BILIRUB UR QL STRIP: NEGATIVE
BUN SERPL-MCNC: 12 MG/DL (ref 6–20)
CALCIUM SERPL-MCNC: 9 MG/DL (ref 8.7–10.5)
CHLORIDE SERPL-SCNC: 112 MMOL/L (ref 95–110)
CLARITY UR: CLEAR
CO2 SERPL-SCNC: 18 MMOL/L (ref 23–29)
COLOR UR: YELLOW
CREAT SERPL-MCNC: 0.8 MG/DL (ref 0.5–1.4)
DIFFERENTIAL METHOD: NORMAL
EOSINOPHIL # BLD AUTO: 0 K/UL (ref 0–0.5)
EOSINOPHIL NFR BLD: 0.8 % (ref 0–8)
ERYTHROCYTE [DISTWIDTH] IN BLOOD BY AUTOMATED COUNT: 14.3 % (ref 11.5–14.5)
EST. GFR  (AFRICAN AMERICAN): >60 ML/MIN/1.73 M^2
EST. GFR  (NON AFRICAN AMERICAN): >60 ML/MIN/1.73 M^2
GLUCOSE SERPL-MCNC: 71 MG/DL (ref 70–110)
GLUCOSE UR QL STRIP: NEGATIVE
HCT VFR BLD AUTO: 41.4 % (ref 37–48.5)
HGB BLD-MCNC: 13.8 G/DL (ref 12–16)
HGB UR QL STRIP: NEGATIVE
INR PPP: 0.9 (ref 0.8–1.2)
KETONES UR QL STRIP: ABNORMAL
LEUKOCYTE ESTERASE UR QL STRIP: NEGATIVE
LIPASE SERPL-CCNC: 23 U/L (ref 4–60)
LYMPHOCYTES # BLD AUTO: 2 K/UL (ref 1–4.8)
LYMPHOCYTES NFR BLD: 39.4 % (ref 18–48)
MCH RBC QN AUTO: 28.4 PG (ref 27–31)
MCHC RBC AUTO-ENTMCNC: 33.3 G/DL (ref 32–36)
MCV RBC AUTO: 85 FL (ref 82–98)
MONOCYTES # BLD AUTO: 0.4 K/UL (ref 0.3–1)
MONOCYTES NFR BLD: 8.2 % (ref 4–15)
NEUTROPHILS # BLD AUTO: 2.6 K/UL (ref 1.8–7.7)
NEUTROPHILS NFR BLD: 51.4 % (ref 38–73)
NITRITE UR QL STRIP: NEGATIVE
PH UR STRIP: 7 [PH] (ref 5–8)
PLATELET # BLD AUTO: 184 K/UL (ref 150–350)
PMV BLD AUTO: 11.4 FL (ref 9.2–12.9)
POTASSIUM SERPL-SCNC: 3.5 MMOL/L (ref 3.5–5.1)
PROT SERPL-MCNC: 7.5 G/DL (ref 6–8.4)
PROT UR QL STRIP: NEGATIVE
PROTHROMBIN TIME: 9.5 SEC (ref 9–12.5)
RBC # BLD AUTO: 4.86 M/UL (ref 4–5.4)
SODIUM SERPL-SCNC: 143 MMOL/L (ref 136–145)
SP GR UR STRIP: 1.01 (ref 1–1.03)
TROPONIN I SERPL DL<=0.01 NG/ML-MCNC: <0.006 NG/ML (ref 0–0.03)
URN SPEC COLLECT METH UR: ABNORMAL
UROBILINOGEN UR STRIP-ACNC: NEGATIVE EU/DL
WBC # BLD AUTO: 5.1 K/UL (ref 3.9–12.7)

## 2019-07-11 PROCEDURE — 99285 EMERGENCY DEPT VISIT HI MDM: CPT | Mod: 25

## 2019-07-11 PROCEDURE — 85730 THROMBOPLASTIN TIME PARTIAL: CPT

## 2019-07-11 PROCEDURE — 93005 ELECTROCARDIOGRAM TRACING: CPT

## 2019-07-11 PROCEDURE — 25500020 PHARM REV CODE 255: Performed by: FAMILY MEDICINE

## 2019-07-11 PROCEDURE — 96361 HYDRATE IV INFUSION ADD-ON: CPT

## 2019-07-11 PROCEDURE — 81003 URINALYSIS AUTO W/O SCOPE: CPT

## 2019-07-11 PROCEDURE — 83690 ASSAY OF LIPASE: CPT

## 2019-07-11 PROCEDURE — 96375 TX/PRO/DX INJ NEW DRUG ADDON: CPT

## 2019-07-11 PROCEDURE — 81025 URINE PREGNANCY TEST: CPT

## 2019-07-11 PROCEDURE — 96374 THER/PROPH/DIAG INJ IV PUSH: CPT

## 2019-07-11 PROCEDURE — 85610 PROTHROMBIN TIME: CPT

## 2019-07-11 PROCEDURE — 93010 ELECTROCARDIOGRAM REPORT: CPT | Mod: ,,, | Performed by: INTERNAL MEDICINE

## 2019-07-11 PROCEDURE — 93010 EKG 12-LEAD: ICD-10-PCS | Mod: ,,, | Performed by: INTERNAL MEDICINE

## 2019-07-11 PROCEDURE — 80053 COMPREHEN METABOLIC PANEL: CPT

## 2019-07-11 PROCEDURE — 85025 COMPLETE CBC W/AUTO DIFF WBC: CPT

## 2019-07-11 PROCEDURE — 25000003 PHARM REV CODE 250: Performed by: FAMILY MEDICINE

## 2019-07-11 PROCEDURE — 63600175 PHARM REV CODE 636 W HCPCS: Performed by: FAMILY MEDICINE

## 2019-07-11 PROCEDURE — 84484 ASSAY OF TROPONIN QUANT: CPT

## 2019-07-11 RX ORDER — KETOROLAC TROMETHAMINE 30 MG/ML
30 INJECTION, SOLUTION INTRAMUSCULAR; INTRAVENOUS
Status: COMPLETED | OUTPATIENT
Start: 2019-07-11 | End: 2019-07-11

## 2019-07-11 RX ORDER — PROCHLORPERAZINE MALEATE 10 MG
10 TABLET ORAL 3 TIMES DAILY PRN
Qty: 15 TABLET | Refills: 0 | Status: SHIPPED | OUTPATIENT
Start: 2019-07-11 | End: 2019-07-16

## 2019-07-11 RX ORDER — ONDANSETRON 2 MG/ML
4 INJECTION INTRAMUSCULAR; INTRAVENOUS
Status: COMPLETED | OUTPATIENT
Start: 2019-07-11 | End: 2019-07-11

## 2019-07-11 RX ORDER — SERTRALINE HYDROCHLORIDE 100 MG/1
150 TABLET, FILM COATED ORAL DAILY
COMMUNITY
End: 2021-06-04

## 2019-07-11 RX ORDER — MORPHINE SULFATE 4 MG/ML
4 INJECTION, SOLUTION INTRAMUSCULAR; INTRAVENOUS
Status: COMPLETED | OUTPATIENT
Start: 2019-07-11 | End: 2019-07-11

## 2019-07-11 RX ORDER — DICYCLOMINE HYDROCHLORIDE 10 MG/1
10 CAPSULE ORAL 2 TIMES DAILY
COMMUNITY
End: 2021-06-04

## 2019-07-11 RX ORDER — DICYCLOMINE HYDROCHLORIDE 20 MG/1
20 TABLET ORAL 2 TIMES DAILY
Qty: 20 TABLET | Refills: 0 | Status: SHIPPED | OUTPATIENT
Start: 2019-07-11 | End: 2019-08-10

## 2019-07-11 RX ORDER — TRAMADOL HYDROCHLORIDE 50 MG/1
50 TABLET ORAL
Status: COMPLETED | OUTPATIENT
Start: 2019-07-11 | End: 2019-07-11

## 2019-07-11 RX ADMIN — TRAMADOL HYDROCHLORIDE 50 MG: 50 TABLET, FILM COATED ORAL at 08:07

## 2019-07-11 RX ADMIN — SODIUM CHLORIDE 1000 ML: 0.9 INJECTION, SOLUTION INTRAVENOUS at 06:07

## 2019-07-11 RX ADMIN — MORPHINE SULFATE 4 MG: 4 INJECTION, SOLUTION INTRAMUSCULAR; INTRAVENOUS at 07:07

## 2019-07-11 RX ADMIN — IOHEXOL 75 ML: 350 INJECTION, SOLUTION INTRAVENOUS at 09:07

## 2019-07-11 RX ADMIN — KETOROLAC TROMETHAMINE 30 MG: 30 INJECTION, SOLUTION INTRAMUSCULAR at 08:07

## 2019-07-11 RX ADMIN — ONDANSETRON 4 MG: 2 INJECTION INTRAMUSCULAR; INTRAVENOUS at 06:07

## 2019-07-11 NOTE — ED PROVIDER NOTES
SCRIBE #1 NOTE: I, Haley Abbasi, am scribing for, and in the presence of, No att. providers found. I have scribed the entire note.      History      Chief Complaint   Patient presents with    Abdominal Pain     upper middle abd pain with n/v; pt reports unable to hold anything down to since sunday       Review of patient's allergies indicates:   Allergen Reactions    Sulfa (sulfonamide antibiotics)         HPI   HPI    7/11/2019, 5:14 PM   History obtained from the patient      History of Present Illness: Lia Avila is a 39 y.o. female patient with PMHx of Alcoholic, IBS, Depression, Functional GI disorder, HLD who presents to the Emergency Department for abdominal pain. Pt states she was recently discharged from [facility] one month ago. Pt sates she is undiagnosable with GI disease for the past 9 years and has been on a G tube for 7 yrs. Pt recently got feeding tube on Monday. Pt states she has consistent abdominal pain along with n/v, constipation (has one bm per month), back pain, chills, diaphoresis. Pt sates abdominal pain got worse on Sunday. She states no medications help for her nausea. Of note, pt noticed her PEG tube was bleeding yesterday. She denies any fever or dysuria. She states other stress factors are causing her to have a hard time such as being released from her job recently. No further complaints or concerns at this time.         Arrival mode: Personal vehicle     PCP: Akanksha West MD       Past Medical History:  Past Medical History:   Diagnosis Date    Acid reflux     Aerophagia     Alcoholic     recovering    Anxiety     Depression     Functional gastrointestinal disorder     Hyperlipidemia     IBS (irritable bowel syndrome)     Irritable bowel syndrome     Sleep apnea     Trivial aortic valve anomaly        Past Surgical History:  Past Surgical History:   Procedure Laterality Date    BACK SURGERY      fusion of L4, L5, and S1    CHEST TUBE PLACEMENT Left  1/16/2017    Performed by Louis O. Jeansonne IV, MD at [facility] OR    CHOLECYSTECTOMY      COLON SURGERY      COLONOSCOPY      Colonoscopy N/A 4/13/2018    Performed by Apurva Salinas MD at [facility] ENDO (4TH FLR)    ESOPHAGOGASTRODUODENOSCOPY (EGD) N/A 4/13/2018    Performed by Apurva Salinas MD at [facility] ENDO (4TH FLR)    ESOPHAGOGASTRODUODENOSCOPY (EGD) N/A 3/28/2017    Performed by Pop Schwab MD at [facility] ENDO    GASTROSTOMY-JEJEUNOSTOMY TUBE CHANGE/PLACEMENT      LAPAROSCOPY-DIAGNOSTIC N/A 1/3/2017    Performed by Louis O. Jeansonne IV, MD at [facility] OR    RJAVN-RSVSGGUD-FLVAUXQWJBDB N/A 1/3/2017    Performed by Louis O. Jeansonne IV, MD at [facility] OR    MANOMETRY, ESOPHAGUS, WITH IMPEDANCE MEASUREMENT N/A 10/8/2018    Performed by [facility] at [facility] (4TH FLR)    MANOMETRY-ANORECTAL N/A 4/3/2018    Performed by Apurva Salinas MD at [facility] ENDO (4TH FLR)    RESECTION - SMALL BOWEL N/A 1/3/2017    Performed by Louis O. Jeansonne IV, MD at [facility] OR    SMALL INTESTINE SURGERY      TRANSESOPHAGEAL ECHOCARDIOGRAM (KARMEN) N/A 4/27/2018    Performed by Ward Ricks MD at [facility] CATH LAB    UPPER GASTROINTESTINAL ENDOSCOPY           Family History:  Family History   Problem Relation Age of Onset    Hypertension Mother     Cancer Father 49        stomach CA    Stomach cancer Father 49    Liver cancer Father     Rectal cancer Maternal Grandmother         dx in 70s    Celiac disease Neg Hx     Cirrhosis Neg Hx     Colon cancer Neg Hx     Colon polyps Neg Hx     Crohn's disease Neg Hx     Cystic fibrosis Neg Hx     Esophageal cancer Neg Hx     Hemochromatosis Neg Hx     Inflammatory bowel disease Neg Hx     Irritable bowel syndrome Neg Hx     Liver disease Neg Hx     Ulcerative colitis Neg Hx     Dain's disease Neg Hx     Lymphoma Neg Hx     Tuberculosis Neg Hx     Scleroderma Neg Hx     Rheum arthritis Neg Hx     Multiple sclerosis Neg Hx     Melanoma Neg Hx     Lupus Neg Hx      Psoriasis Neg Hx     Skin cancer Neg Hx        Social History:  Social History     Tobacco Use    Smoking status: Smoker, Current Status Unknown     Packs/day: 0.25     Types: Cigarettes     Last attempt to quit: 12/3/2016     Years since quittin.6    Smokeless tobacco: Never Used   Substance and Sexual Activity    Alcohol use: No     Comment: pt states that she is a recoveirng alcoholic, last drink 11    Drug use: Not Currently     Types: Marijuana    Sexual activity: Not on file       ROS   Review of Systems   Constitutional: Positive for chills and diaphoresis. Negative for fever.   HENT: Negative for congestion and sore throat.    Respiratory: Negative for shortness of breath.    Cardiovascular: Negative for chest pain.   Gastrointestinal: Positive for abdominal pain, constipation, nausea and vomiting.   Genitourinary: Negative for dysuria and frequency.   Musculoskeletal: Positive for back pain.   Skin: Negative for rash.   Neurological: Negative for weakness and headaches.   Hematological: Does not bruise/bleed easily.   All other systems reviewed and are negative.      Physical Exam      Initial Vitals [19 1655]   BP Pulse Resp Temp SpO2   131/64 85 20 98.5 °F (36.9 °C) 98 %      MAP       --          Physical Exam  Nursing Notes and Vital Signs Reviewed.  Constitutional: Patient is in moderate distress. Well-developed and well-nourished.  Head: Atraumatic. Normocephalic.  Eyes: PERRL. EOM intact. Conjunctivae are not pale. No scleral icterus.  ENT: Mucous membranes are moist. Oropharynx is clear and symmetric.    Neck: Supple. Full ROM. No lymphadenopathy.  Cardiovascular: Regular rate. Regular rhythm. No murmurs, rubs, or gallops. Distal pulses are 2+ and symmetric.  Pulmonary/Chest: No respiratory distress. Clear to auscultation bilaterally. No wheezing or rales.  Abdominal: Soft and non-distended.  There is mild tenderness in all 4 quadrants.  No rebound, guarding, or rigidity. Good  "bowel sounds.  Genitourinary: No CVA tenderness  Musculoskeletal: Moves all extremities. No obvious deformities. No edema. No calf tenderness.  Skin: Warm and dry. PEG tube in place in LUQ. No sign of infection, active discharge, or erythema.  Neurological:  Alert, awake, and appropriate.  Normal speech.  No acute focal neurological deficits are appreciated.  Psychiatric: Normal affect. Good eye contact. Appropriate in content.    ED Course    Procedures  ED Vital Signs:  Vitals:    07/11/19 1655 07/11/19 1834 07/11/19 1846 07/11/19 1903   BP: 131/64 120/67 124/62 (!) 125/52   Pulse: 85  68 60   Resp: 20      Temp: 98.5 °F (36.9 °C)      TempSrc: Oral      SpO2: 98%  100% 100%   Weight: 63.3 kg (139 lb 10.6 oz)      Height: 5' 2" (1.575 m)       07/11/19 1916 07/11/19 1936 07/11/19 1946   BP: (!) 110/56 (!) 112/54 (!) 108/57   Pulse: 61 69 70   Resp:      Temp:      TempSrc:      SpO2: 100% 100% 100%   Weight:      Height:          Abnormal Lab Results:  Labs Reviewed   COMPREHENSIVE METABOLIC PANEL - Abnormal; Notable for the following components:       Result Value    Chloride 112 (*)     CO2 18 (*)     All other components within normal limits   URINALYSIS, REFLEX TO URINE CULTURE - Abnormal; Notable for the following components:    Ketones, UA 1+ (*)     All other components within normal limits    Narrative:     Preferred Collection Type->Urine, Clean Catch   CBC W/ AUTO DIFFERENTIAL   LIPASE   APTT   PROTIME-INR   TROPONIN I   PREGNANCY TEST, URINE RAPID        All Lab Results:  Results for orders placed or performed during the hospital encounter of 07/11/19   CBC W/ AUTO DIFFERENTIAL   Result Value Ref Range    WBC 5.10 3.90 - 12.70 K/uL    RBC 4.86 4.00 - 5.40 M/uL    Hemoglobin 13.8 12.0 - 16.0 g/dL    Hematocrit 41.4 37.0 - 48.5 %    Mean Corpuscular Volume 85 82 - 98 fL    Mean Corpuscular Hemoglobin 28.4 27.0 - 31.0 pg    Mean Corpuscular Hemoglobin Conc 33.3 32.0 - 36.0 g/dL    RDW 14.3 11.5 - 14.5 %    " Platelets 184 150 - 350 K/uL    MPV 11.4 9.2 - 12.9 fL    Gran # (ANC) 2.6 1.8 - 7.7 K/uL    Lymph # 2.0 1.0 - 4.8 K/uL    Mono # 0.4 0.3 - 1.0 K/uL    Eos # 0.0 0.0 - 0.5 K/uL    Baso # 0.01 0.00 - 0.20 K/uL    Gran% 51.4 38.0 - 73.0 %    Lymph% 39.4 18.0 - 48.0 %    Mono% 8.2 4.0 - 15.0 %    Eosinophil% 0.8 0.0 - 8.0 %    Basophil% 0.2 0.0 - 1.9 %    Differential Method Automated    Comp. Metabolic Panel   Result Value Ref Range    Sodium 143 136 - 145 mmol/L    Potassium 3.5 3.5 - 5.1 mmol/L    Chloride 112 (H) 95 - 110 mmol/L    CO2 18 (L) 23 - 29 mmol/L    Glucose 71 70 - 110 mg/dL    BUN, Bld 12 6 - 20 mg/dL    Creatinine 0.8 0.5 - 1.4 mg/dL    Calcium 9.0 8.7 - 10.5 mg/dL    Total Protein 7.5 6.0 - 8.4 g/dL    Albumin 4.4 3.5 - 5.2 g/dL    Total Bilirubin 0.3 0.1 - 1.0 mg/dL    Alkaline Phosphatase 67 55 - 135 U/L    AST 24 10 - 40 U/L    ALT 22 10 - 44 U/L    Anion Gap 13 8 - 16 mmol/L    eGFR if African American >60 >60 mL/min/1.73 m^2    eGFR if non African American >60 >60 mL/min/1.73 m^2   Lipase   Result Value Ref Range    Lipase 23 4 - 60 U/L   Urinalysis, Reflex to Urine Culture Urine, Clean Catch   Result Value Ref Range    Specimen UA Urine, Clean Catch     Color, UA Yellow Yellow, Straw, [place]    Appearance, UA Clear Clear    pH, UA 7.0 5.0 - 8.0    Specific Gravity, UA 1.015 1.005 - 1.030    Protein, UA Negative Negative    Glucose, UA Negative Negative    Ketones, UA 1+ (A) Negative    Bilirubin (UA) Negative Negative    Occult Blood UA Negative Negative    Nitrite, UA Negative Negative    Urobilinogen, UA Negative <2.0 EU/dL    Leukocytes, UA Negative Negative   APTT   Result Value Ref Range    aPTT 31.3 21.0 - 32.0 sec   Protime-INR   Result Value Ref Range    Prothrombin Time 9.5 9.0 - 12.5 sec    INR 0.9 0.8 - 1.2   Troponin I   Result Value Ref Range    Troponin I <0.006 0.000 - 0.026 ng/mL   Pregnancy, urine rapid   Result Value Ref Range    Preg Test, Ur Negative          Imaging  Results:  Imaging Results          CT Abdomen Pelvis With Contrast (Final result)  Result time 07/11/19 22:03:42    Final result by Duncan Alva III, MD (07/11/19 22:03:42)                 Impression:      No acute abnormality identified in the abdomen or pelvis.  Nonacute findings, as above.      Electronically signed by: Duncan Alva MD  Date:    07/11/2019  Time:    22:03             Narrative:    EXAMINATION:  CT ABDOMEN PELVIS WITH CONTRAST    CLINICAL HISTORY:  Abdominal distension;    TECHNIQUE:  Routine abdominal and pelvic CT performed before and after the IV administration of 75 mL Omnipaque 350. Coronal and sagittal images obtained. All CT scans at this facility are performed  using dose modulation techniques as appropriate to performed exam including the following:  automated exposure control; adjustment of mA and/or kV according to the patients size (this includes techniques or standardized protocols for targeted exams where dose is matched to indication/reason for exam: i.e. extremities or head);  iterative reconstruction technique.    COMPARISON:  05/07/2019    FINDINGS:  No acute disease or suspicious mass is seen in the lung bases.  Stable lumbosacral fusion changes.  Stable anterolisthesis at L5-S1.  No acute osseous abnormality or suspicious bone marrow lesion identified.    Stable periumbilical hernia containing a short segment of transverse colon without evidence of obstruction or other acute complication.  There is no evidence of bowel obstruction, acute inflammatory process or other acute bowel pathology.  Peg tube remains well positioned in the gastric lumen without evidence of acute complication.  No evidence of appendicitis. No free intraperitoneal fluid, free air or abscess identified.    The liver is unremarkable. Gallbladder is surgically absent.  There is stable mild associated biliary ductal dilation without obstructing lesion identified.  The pancreas, spleen and adrenals are  unremarkable. No aortic aneurysm is identified. The kidneys and ureters are unremarkable. The bladder is within normal limits. There is a small left ovarian corpus luteum cyst.  The uterus and right adnexa are unremarkable.  No pathologically enlarged lymph nodes are identified.                               The EKG was ordered, reviewed, and independently interpreted by the ED provider.  Interpretation time: 17:39  Rate: 67 BPM  Rhythm: normal sinus rhythm  Interpretation: No St changes. No STEMI.             The Emergency Provider reviewed the vital signs and test results, which are outlined above.    ED Discussion   10:25 PM: Reassessed pt at this time. Discussed with pt all pertinent ED information and results. Discussed pt dx and plan of tx. Gave pt all f/u and return to the ED instructions. All questions and concerns were addressed at this time. Pt expresses understanding of information and instructions, and is comfortable with plan to discharge. Pt is stable for discharge.    Regarding ABDOMINAL PAIN, I recommended that the patient: Sip water or other clear fluids; avoid solid food for the first few hours after vomiting or diarrhea; if vomiting, wait 6 hours, and then eat small amounts of mild foods such as rice, applesauce, or crackers; avoid dairy products; avoid citrus, high-fat foods, fried or greasy foods, tomato products, caffeine, alcohol, and carbonated beverages;  avoid aspirin, ibuprofen or other anti-inflammatory medications, and narcotic pain medications unless prescribed.  In regards to prevention, I encouraged patient to:  Avoid fatty or greasy foods; drink plenty of water each day; eat small meals more frequently; exercise regularly; limit foods that produce gas; make sure meals are well-balanced and high in fiber and include plenty of fruits and vegetables.    I discussed with patient and/or family/caretaker that evaluation in the ED does not suggest any emergent or life threatening medical  conditions requiring immediate intervention beyond what was provided in the ED, and I believe patient is safe for discharge.  Regardless, an unremarkable evaluation in the ED does not preclude the development or presence of a serious of life threatening condition. As such, patient was instructed to return immediately for any worsening or change in current symptoms.      ED Course as of Jul 13 0502   Thu Jul 11, 2019   2223 Called the lab, patient's troponin is negative    [[place]]      ED Course User Index  [[place]] Haley Abbasi MD       ED Medication(s):  Medications   sodium chloride 0.9% bolus 1,000 mL (0 mLs Intravenous Stopped 7/11/19 2056)   ondansetron injection 4 mg (4 mg Intravenous Given 7/11/19 1853)   morphine injection 4 mg (4 mg Intravenous Given 7/11/19 1921)   ketorolac injection 30 mg (30 mg Intravenous Given 7/11/19 2052)   traMADol tablet 50 mg (50 mg Oral Given 7/11/19 2052)   iohexol (OMNIPAQUE 350) injection 75 mL (75 mLs Intravenous Given 7/11/19 2136)          Medication List      START taking these medications    prochlorperazine 10 MG tablet  Commonly known as:  COMPAZINE  Take 1 tablet (10 mg total) by mouth 3 (three) times daily as needed.        CHANGE how you take these medications    * dicyclomine 10 MG capsule  Commonly known as:  BENTYL  What changed:  Another medication with the same name was added. Make sure you understand how and when to take each.     * dicyclomine 20 mg tablet  Commonly known as:  BENTYL  Take 1 tablet (20 mg total) by mouth 2 (two) times daily.  What changed:  You were already taking a medication with the same name, and this prescription was added. Make sure you understand how and when to take each.         * This list has 2 medication(s) that are the same as other medications prescribed for you. Read the directions carefully, and ask your doctor or other care provider to review them with you.            CONTINUE taking these medications    atorvastatin 20 MG  tablet  Commonly known as:  LIPITOR     cholecalciferol (vitamin D3) 2,000 unit Cap  Commonly known as:  VITAMIN D3     dextroamphetamine-amphetamine 20 mg tablet  Commonly known as:  ADDERALL     diazePAM 10 MG Tab  Commonly known as:  VALIUM     esomeprazole 40 MG capsule  Commonly known as:  NEXIUM  Take 1 capsule (40 mg total) by mouth 2 (two) times daily before meals.     ferrous gluconate 324 MG tablet  Commonly known as:  FERGON     HYDROcodone-acetaminophen  mg per tablet  Commonly known as:  NORCO  Take 1 tablet by mouth every 4 (four) hours as needed for Pain.     LINZESS 290 mcg Cap capsule  Generic drug:  linaCLOtide     metFORMIN 500 MG tablet  Commonly known as:  GLUCOPHAGE     metoclopramide HCl 10 MG tablet  Commonly known as:  REGLAN  Take 1 tablet (10 mg total) by mouth every 6 (six) hours.     mirtazapine 7.5 MG Tab  Commonly known as:  REMERON     mupirocin 2 % ointment  Commonly known as:  BACTROBAN  Apply topically 2 (two) times daily. Apply small amount to abdominal wall wound after gently cleaning area with soap and water     ondansetron 4 MG Tbdl  Commonly known as:  ZOFRAN-ODT  Take 2 tablets (8 mg total) by mouth every 8 (eight) hours as needed.     promethazine 25 MG suppository  Commonly known as:  PHENERGAN  Place 1 suppository (25 mg total) rectally every 6 (six) hours as needed for Nausea.     RELISTOR 8 mg/0.4 mL Syrg  Generic drug:  methylnaltrexone     topiramate 100 MG tablet  Commonly known as:  TOPAMAX     traZODone 150 MG tablet  Commonly known as:  DESYREL     ZOLOFT 100 MG tablet  Generic drug:  sertraline           Where to Get Your Medications      You can get these medications from any pharmacy    Bring a paper prescription for each of these medications  · dicyclomine 20 mg tablet  · prochlorperazine 10 MG tablet         Follow-up Information     Akanksha West MD. Schedule an appointment as soon as possible for a visit in 3 days.    Specialty:  Internal  Medicine  Contact information:  [address]  [address]  [facility] [address]  [phone]                     Medical Decision Making    Medical Decision Making:   Clinical Tests:   Lab Tests: Ordered and Reviewed  Radiological Study: Ordered and Reviewed  Medical Tests: Ordered and Reviewed           Scribe Attestation:   Scribe #1: I performed the above scribed service and the documentation accurately describes the services I performed. I attest to the accuracy of the note.    Attending:   Physician Attestation Statement for Scribe #1: I, Haley Abbasi MD, personally performed the services described in this documentation, as scribed by Abby Blancas, in my presence, and it is both accurate and complete.          Clinical Impression       ICD-10-CM ICD-9-CM   1. Chronic abdominal pain R10.9 789.00    G89.29 338.29   2. Abdominal pain R10.9 789.00       Disposition:   Disposition: Discharged  Condition: Stable    Lia Avila was given education on their disease process and medications.  Please follow up with [facility] as you have had an appointment within the month and have not followed up yet.  If symptoms worsen including abdominal pain, nausea vomiting, fevers chills please do not hesitate to come back to emergency department.    Regarding ABDOMINAL PAIN, I recommended that the patient: Sip water or other clear fluids; avoid solid food for the first few hours after vomiting or diarrhea; if vomiting, wait 6 hours, and then eat small amounts of mild foods such as rice, applesauce, or crackers; avoid dairy products; avoid citrus, high-fat foods, fried or greasy foods, tomato products, caffeine, alcohol, and carbonated beverages;  avoid aspirin, ibuprofen or other anti-inflammatory medications, and narcotic pain medications unless prescribed.  In regards to prevention, I encouraged patient to:  Avoid fatty or greasy foods; drink plenty of water each day; eat small meals more frequently;  exercise regularly; limit foods that produce gas; make sure meals are well-balanced and high in fiber and include plenty of fruits and vegetables.    Discharge Medication List as of 7/11/2019 10:25 PM      START taking these medications    Details   dicyclomine (BENTYL) 20 mg tablet Take 1 tablet (20 mg total) by mouth 2 (two) times daily., Starting u 7/11/2019, Until Sat 8/10/2019, Print      prochlorperazine (COMPAZINE) 10 MG tablet Take 1 tablet (10 mg total) by mouth 3 (three) times daily as needed., Starting Thu 7/11/2019, Until Tue 7/16/2019, Print                Haley Abbasi MD  07/13/19 1354

## 2019-07-11 NOTE — ED NOTES
Delay in obtaining IV access, unable to find peripheral venous access for patient. MEDARDO Kline notified. MEDARDO Gates asked to attempt US guided access at this time. US currently off unit.

## 2019-07-12 NOTE — ED NOTES
Pt resting in ED bed on beverley phone in no acute distress. Spouse at bedside. Vital signs stable. Respirations even and unlabored. Bed in lowest position. Call bell in reach. Will continue to monitor.

## 2019-07-12 NOTE — DISCHARGE INSTRUCTIONS
Please follow up with [facility] as you have had an appointment within the month and have not followed up yet.  If symptoms worsen including abdominal pain, nausea vomiting, fevers chills please do not hesitate to come back to emergency department.    Regarding ABDOMINAL PAIN, I recommended that the patient: Sip water or other clear fluids; avoid solid food for the first few hours after vomiting or diarrhea; if vomiting, wait 6 hours, and then eat small amounts of mild foods such as rice, applesauce, or crackers; avoid dairy products; avoid citrus, high-fat foods, fried or greasy foods, tomato products, caffeine, alcohol, and carbonated beverages;  avoid aspirin, ibuprofen or other anti-inflammatory medications, and narcotic pain medications unless prescribed.  In regards to prevention, I encouraged patient to:  Avoid fatty or greasy foods; drink plenty of water each day; eat small meals more frequently; exercise regularly; limit foods that produce gas; make sure meals are well-balanced and high in fiber and include plenty of fruits and vegetables.

## 2019-09-11 ENCOUNTER — HOSPITAL ENCOUNTER (EMERGENCY)
Facility: HOSPITAL | Age: 39
Discharge: HOME | End: 2019-09-11
Attending: EMERGENCY MEDICINE
Payer: COMMERCIAL

## 2019-09-11 VITALS
SYSTOLIC BLOOD PRESSURE: 104 MMHG | BODY MASS INDEX: 24.63 KG/M2 | HEART RATE: 66 BPM | HEIGHT: 62 IN | WEIGHT: 133.81 LBS | RESPIRATION RATE: 16 BRPM | OXYGEN SATURATION: 100 % | TEMPERATURE: 98 F | DIASTOLIC BLOOD PRESSURE: 52 MMHG

## 2019-09-11 DIAGNOSIS — R10.9 CHRONIC ABDOMINAL PAIN: Primary | ICD-10-CM

## 2019-09-11 DIAGNOSIS — K59.09 CHRONIC CONSTIPATION: ICD-10-CM

## 2019-09-11 DIAGNOSIS — G89.29 CHRONIC ABDOMINAL PAIN: Primary | ICD-10-CM

## 2019-09-11 LAB
ALBUMIN SERPL BCP-MCNC: 4.2 G/DL (ref 3.5–5.2)
ALP SERPL-CCNC: 56 U/L (ref 55–135)
ALT SERPL W/O P-5'-P-CCNC: 14 U/L (ref 10–44)
ANION GAP SERPL CALC-SCNC: 9 MMOL/L (ref 8–16)
AST SERPL-CCNC: 12 U/L (ref 10–40)
B-HCG UR QL: NEGATIVE
BASOPHILS # BLD AUTO: 0.02 K/UL (ref 0–0.2)
BASOPHILS NFR BLD: 0.3 % (ref 0–1.9)
BILIRUB SERPL-MCNC: 0.4 MG/DL (ref 0.1–1)
BILIRUB UR QL STRIP: NEGATIVE
BUN SERPL-MCNC: 12 MG/DL (ref 6–20)
CALCIUM SERPL-MCNC: 9.3 MG/DL (ref 8.7–10.5)
CHLORIDE SERPL-SCNC: 109 MMOL/L (ref 95–110)
CLARITY UR: CLEAR
CO2 SERPL-SCNC: 24 MMOL/L (ref 23–29)
COLOR UR: YELLOW
CREAT SERPL-MCNC: 0.8 MG/DL (ref 0.5–1.4)
DIFFERENTIAL METHOD: NORMAL
EOSINOPHIL # BLD AUTO: 0 K/UL (ref 0–0.5)
EOSINOPHIL NFR BLD: 0.5 % (ref 0–8)
ERYTHROCYTE [DISTWIDTH] IN BLOOD BY AUTOMATED COUNT: 14.3 % (ref 11.5–14.5)
EST. GFR  (AFRICAN AMERICAN): >60 ML/MIN/1.73 M^2
EST. GFR  (NON AFRICAN AMERICAN): >60 ML/MIN/1.73 M^2
GLUCOSE SERPL-MCNC: 87 MG/DL (ref 70–110)
GLUCOSE UR QL STRIP: NEGATIVE
HCT VFR BLD AUTO: 41.2 % (ref 37–48.5)
HGB BLD-MCNC: 13.8 G/DL (ref 12–16)
HGB UR QL STRIP: NEGATIVE
KETONES UR QL STRIP: NEGATIVE
LEUKOCYTE ESTERASE UR QL STRIP: NEGATIVE
LYMPHOCYTES # BLD AUTO: 1.6 K/UL (ref 1–4.8)
LYMPHOCYTES NFR BLD: 25.7 % (ref 18–48)
MCH RBC QN AUTO: 28.4 PG (ref 27–31)
MCHC RBC AUTO-ENTMCNC: 33.5 G/DL (ref 32–36)
MCV RBC AUTO: 85 FL (ref 82–98)
MONOCYTES # BLD AUTO: 0.5 K/UL (ref 0.3–1)
MONOCYTES NFR BLD: 8.9 % (ref 4–15)
NEUTROPHILS # BLD AUTO: 3.9 K/UL (ref 1.8–7.7)
NEUTROPHILS NFR BLD: 64.8 % (ref 38–73)
NITRITE UR QL STRIP: NEGATIVE
PH UR STRIP: >8 [PH] (ref 5–8)
PLATELET # BLD AUTO: 204 K/UL (ref 150–350)
PMV BLD AUTO: 10.5 FL (ref 9.2–12.9)
POTASSIUM SERPL-SCNC: 3.6 MMOL/L (ref 3.5–5.1)
PROT SERPL-MCNC: 7.1 G/DL (ref 6–8.4)
PROT UR QL STRIP: NEGATIVE
RBC # BLD AUTO: 4.86 M/UL (ref 4–5.4)
SODIUM SERPL-SCNC: 142 MMOL/L (ref 136–145)
SP GR UR STRIP: 1.01 (ref 1–1.03)
URN SPEC COLLECT METH UR: ABNORMAL
UROBILINOGEN UR STRIP-ACNC: NEGATIVE EU/DL
WBC # BLD AUTO: 6.1 K/UL (ref 3.9–12.7)

## 2019-09-11 PROCEDURE — 25500020 PHARM REV CODE 255: Performed by: EMERGENCY MEDICINE

## 2019-09-11 PROCEDURE — 96375 TX/PRO/DX INJ NEW DRUG ADDON: CPT

## 2019-09-11 PROCEDURE — 96376 TX/PRO/DX INJ SAME DRUG ADON: CPT

## 2019-09-11 PROCEDURE — 36415 COLL VENOUS BLD VENIPUNCTURE: CPT

## 2019-09-11 PROCEDURE — 85025 COMPLETE CBC W/AUTO DIFF WBC: CPT

## 2019-09-11 PROCEDURE — 81003 URINALYSIS AUTO W/O SCOPE: CPT

## 2019-09-11 PROCEDURE — 81025 URINE PREGNANCY TEST: CPT

## 2019-09-11 PROCEDURE — 80053 COMPREHEN METABOLIC PANEL: CPT

## 2019-09-11 PROCEDURE — 96374 THER/PROPH/DIAG INJ IV PUSH: CPT | Mod: 59

## 2019-09-11 PROCEDURE — 63600175 PHARM REV CODE 636 W HCPCS: Performed by: EMERGENCY MEDICINE

## 2019-09-11 PROCEDURE — 99285 EMERGENCY DEPT VISIT HI MDM: CPT | Mod: 25

## 2019-09-11 PROCEDURE — 96361 HYDRATE IV INFUSION ADD-ON: CPT

## 2019-09-11 RX ORDER — ONDANSETRON 2 MG/ML
4 INJECTION INTRAMUSCULAR; INTRAVENOUS
Status: COMPLETED | OUTPATIENT
Start: 2019-09-11 | End: 2019-09-11

## 2019-09-11 RX ORDER — MORPHINE SULFATE 4 MG/ML
4 INJECTION, SOLUTION INTRAMUSCULAR; INTRAVENOUS
Status: COMPLETED | OUTPATIENT
Start: 2019-09-11 | End: 2019-09-11

## 2019-09-11 RX ORDER — DULOXETIN HYDROCHLORIDE 30 MG/1
CAPSULE, DELAYED RELEASE ORAL
Refills: 0 | COMMUNITY
Start: 2019-06-21 | End: 2021-06-04

## 2019-09-11 RX ORDER — ONDANSETRON 4 MG/1
4 TABLET, ORALLY DISINTEGRATING ORAL EVERY 8 HOURS PRN
Qty: 15 TABLET | Refills: 0 | Status: SHIPPED | OUTPATIENT
Start: 2019-09-11

## 2019-09-11 RX ADMIN — ONDANSETRON 4 MG: 2 INJECTION INTRAMUSCULAR; INTRAVENOUS at 12:09

## 2019-09-11 RX ADMIN — IOHEXOL 75 ML: 350 INJECTION, SOLUTION INTRAVENOUS at 01:09

## 2019-09-11 RX ADMIN — LORAZEPAM 1 MG: 2 INJECTION INTRAMUSCULAR; INTRAVENOUS at 12:09

## 2019-09-11 RX ADMIN — MORPHINE SULFATE 4 MG: 4 INJECTION, SOLUTION INTRAMUSCULAR; INTRAVENOUS at 02:09

## 2019-09-11 RX ADMIN — ONDANSETRON 4 MG: 2 INJECTION INTRAMUSCULAR; INTRAVENOUS at 02:09

## 2019-09-11 RX ADMIN — SODIUM CHLORIDE 1000 ML: 0.9 INJECTION, SOLUTION INTRAVENOUS at 12:09

## 2019-09-11 NOTE — ED PROVIDER NOTES
SCRIBE #1 NOTE: I, Idania Cabrales, am scribing for, and in the presence of, Clem Lovell Jr., MD. I have scribed the entire note.       History     Chief Complaint   Patient presents with    Abdominal Pain     extensive abdominal issues- history of resection. no BM for 2 weeks.      Review of patient's allergies indicates:   Allergen Reactions    Sulfa (sulfonamide antibiotics)          History of Present Illness     HPI    9/11/2019, 12:13 PM  History obtained from the patient      History of Present Illness: Lia Avila is a 39 y.o. female patient with a PMHx of IBS, acid reflux, HLD and PSHx of cholecystectomy, colon surgery, colonoscopy, small intestine surgery, gastrostomy-jejusnostomy tube, and esophogeal manometry who presents to the Emergency Department for evaluation of chronic abdominal pain which onset gradually a few days ago. This is a recurrent problem, pt was last seen in the ED on 7/11. Pt reports she has not had a BM for 2 weeks. Symptoms are constant and moderate in severity. No mitigating or exacerbating factors reported. Associated sxs include n/v. Patient denies any fever, chills, hematochezia, diarrhea, dysuria, urinary frequency/urgency, hematuria, dizziness, extremity weakness/numbness, and all other sxs at this time. No further complaints or concerns at this time.       Arrival mode: Personal vehicle    PCP: Akanksha West MD        Past Medical History:  Past Medical History:   Diagnosis Date    Acid reflux     Aerophagia     Alcoholic     recovering    Anxiety     Depression     Functional gastrointestinal disorder     Hyperlipidemia     IBS (irritable bowel syndrome)     Irritable bowel syndrome     Sleep apnea     Trivial aortic valve anomaly        Past Surgical History:  Past Surgical History:   Procedure Laterality Date    BACK SURGERY      fusion of L4, L5, and S1    CHEST TUBE PLACEMENT Left 1/16/2017    Performed by Louis O. Jeansonne IV, MD at [facility] OR     CHOLECYSTECTOMY      COLON SURGERY      COLONOSCOPY      Colonoscopy N/A 4/13/2018    Performed by Apurva Salinas MD at [facility] ENDO (4TH FLR)    ESOPHAGOGASTRODUODENOSCOPY (EGD) N/A 4/13/2018    Performed by Apruva Salinas MD at [facility] ENDO (4TH FLR)    ESOPHAGOGASTRODUODENOSCOPY (EGD) N/A 3/28/2017    Performed by Pop Schwab MD at [facility] ENDO    GASTROSTOMY-JEJEUNOSTOMY TUBE CHANGE/PLACEMENT      LAPAROSCOPY-DIAGNOSTIC N/A 1/3/2017    Performed by Louis O. Jeansonne IV, MD at [facility] OR    BGRKF-GQVEYHQE-DCQZHIFFNMSW N/A 1/3/2017    Performed by Louis O. Jeansonne IV, MD at [facility] OR    MANOMETRY, ESOPHAGUS, WITH IMPEDANCE MEASUREMENT N/A 10/8/2018    Performed by [facility] at [facility] (4[facility] FLR)    MANOMETRY-ANORECTAL N/A 4/3/2018    Performed by Apurva Salinas MD at [facility] (4TH FLR)    RESECTION - SMALL BOWEL N/A 1/3/2017    Performed by Louis O. Jeansonne IV, MD at [facility] OR    SMALL INTESTINE SURGERY      TRANSESOPHAGEAL ECHOCARDIOGRAM (KARMEN) N/A 4/27/2018    Performed by Ward Ricks MD at [facility] CATH LAB    UPPER GASTROINTESTINAL ENDOSCOPY           Family History:  Family History   Problem Relation Age of Onset    Hypertension Mother     Cancer Father 49        stomach CA    Stomach cancer Father 49    Liver cancer Father     Rectal cancer Maternal Grandmother         dx in 70s    Celiac disease Neg Hx     Cirrhosis Neg Hx     Colon cancer Neg Hx     Colon polyps Neg Hx     Crohn's disease Neg Hx     Cystic fibrosis Neg Hx     Esophageal cancer Neg Hx     Hemochromatosis Neg Hx     Inflammatory bowel disease Neg Hx     Irritable bowel syndrome Neg Hx     Liver disease Neg Hx     Ulcerative colitis Neg Hx     Dain's disease Neg Hx     Lymphoma Neg Hx     Tuberculosis Neg Hx     Scleroderma Neg Hx     Rheum arthritis Neg Hx     Multiple sclerosis Neg Hx     Melanoma Neg Hx     Lupus Neg Hx     Psoriasis Neg Hx     Skin cancer Neg Hx        Social  History:  Social History     Tobacco Use    Smoking status: Smoker, Current Status Unknown     Packs/day: 0.25     Types: Cigarettes     Last attempt to quit: 12/3/2016     Years since quittin.7    Smokeless tobacco: Never Used   Substance and Sexual Activity    Alcohol use: No     Comment: pt states that she is a recoveirng alcoholic, last drink 11    Drug use: Not Currently     Types: Marijuana    Sexual activity: Unknown        Review of Systems     Review of Systems   Constitutional: Negative for chills and fever.   HENT: Negative for rhinorrhea and sore throat.    Respiratory: Negative for shortness of breath.    Cardiovascular: Negative for chest pain.   Gastrointestinal: Positive for abdominal pain (chronic), constipation, nausea and vomiting. Negative for blood in stool and diarrhea.   Genitourinary: Negative for dysuria, frequency, hematuria and urgency.   Musculoskeletal: Negative for back pain.   Skin: Negative for rash.   Neurological: Negative for dizziness, weakness and numbness.   All other systems reviewed and are negative.       Physical Exam     Initial Vitals [19 1148]   BP Pulse Resp Temp SpO2   (!) 89/46 96 18 98.1 °F (36.7 °C) 99 %      MAP       --          Physical Exam  Nursing Notes and Vital Signs Reviewed.  Constitutional: Patient is in no acute distress. Well-developed and well-nourished.  Head: Atraumatic. Normocephalic.  Eyes: PERRL. EOM intact. Conjunctivae are not pale. No scleral icterus.  ENT: Mucous membranes are moist. Oropharynx is clear and symmetric.    Neck: Supple. Full ROM. No lymphadenopathy.  Cardiovascular: Regular rate. Regular rhythm. No murmurs, rubs, or gallops. Distal pulses are 2+ and symmetric.  Pulmonary/Chest: No respiratory distress. Clear to auscultation bilaterally. No wheezing or rales.  Abdominal: Soft and non-distended.  Lower abdominal pain.  No rebound, guarding, or rigidity. Good bowel sounds.  Rectal: Female chaperone present for the  "duration of the rectal exam.There is no tenderness. No masses or hemorrhoids. Normal sphincter tone.  Musculoskeletal: Moves all extremities. No obvious deformities. No edema. No calf tenderness.  Skin: Warm and dry.  Neurological:  Alert, awake, and appropriate.  Normal speech.  No acute focal neurological deficits are appreciated.  Psychiatric: Normal affect. Good eye contact. Appropriate in content.     ED Course   Procedures  ED Vital Signs:  Vitals:    09/11/19 1148 09/11/19 1201 09/11/19 1202 09/11/19 1301   BP: (!) 89/46 (!) 104/59  (!) 105/57   Pulse: 96 68 68 68   Resp: 18   20   Temp: 98.1 °F (36.7 °C)      SpO2: 99% 100%  100%   Weight: 60.7 kg (133 lb 13.1 oz)      Height: 5' 2" (1.575 m)       09/11/19 1319 09/11/19 1350   BP:     Pulse: 75 63   Resp: 20 20   Temp:     SpO2: 100% 100%   Weight:     Height:         Abnormal Lab Results:  Labs Reviewed   URINALYSIS, REFLEX TO URINE CULTURE - Abnormal; Notable for the following components:       Result Value    pH, UA >8.0 (*)     All other components within normal limits    Narrative:     Preferred Collection Type->Urine, Clean Catch   CBC W/ AUTO DIFFERENTIAL   COMPREHENSIVE METABOLIC PANEL   PREGNANCY TEST, URINE RAPID        All Lab Results:  Results for orders placed or performed during the hospital encounter of 09/11/19   CBC auto differential   Result Value Ref Range    WBC 6.10 3.90 - 12.70 K/uL    RBC 4.86 4.00 - 5.40 M/uL    Hemoglobin 13.8 12.0 - 16.0 g/dL    Hematocrit 41.2 37.0 - 48.5 %    Mean Corpuscular Volume 85 82 - 98 fL    Mean Corpuscular Hemoglobin 28.4 27.0 - 31.0 pg    Mean Corpuscular Hemoglobin Conc 33.5 32.0 - 36.0 g/dL    RDW 14.3 11.5 - 14.5 %    Platelets 204 150 - 350 K/uL    MPV 10.5 9.2 - 12.9 fL    Gran # (ANC) 3.9 1.8 - 7.7 K/uL    Lymph # 1.6 1.0 - 4.8 K/uL    Mono # 0.5 0.3 - 1.0 K/uL    Eos # 0.0 0.0 - 0.5 K/uL    Baso # 0.02 0.00 - 0.20 K/uL    Gran% 64.8 38.0 - 73.0 %    Lymph% 25.7 18.0 - 48.0 %    Mono% 8.9 4.0 - " 15.0 %    Eosinophil% 0.5 0.0 - 8.0 %    Basophil% 0.3 0.0 - 1.9 %    Differential Method Automated    Comprehensive metabolic panel   Result Value Ref Range    Sodium 142 136 - 145 mmol/L    Potassium 3.6 3.5 - 5.1 mmol/L    Chloride 109 95 - 110 mmol/L    CO2 24 23 - 29 mmol/L    Glucose 87 70 - 110 mg/dL    BUN, Bld 12 6 - 20 mg/dL    Creatinine 0.8 0.5 - 1.4 mg/dL    Calcium 9.3 8.7 - 10.5 mg/dL    Total Protein 7.1 6.0 - 8.4 g/dL    Albumin 4.2 3.5 - 5.2 g/dL    Total Bilirubin 0.4 0.1 - 1.0 mg/dL    Alkaline Phosphatase 56 55 - 135 U/L    AST 12 10 - 40 U/L    ALT 14 10 - 44 U/L    Anion Gap 9 8 - 16 mmol/L    eGFR if African American >60 >60 mL/min/1.73 m^2    eGFR if non African American >60 >60 mL/min/1.73 m^2   Urinalysis, Reflex to Urine Culture Urine, Clean Catch   Result Value Ref Range    Specimen UA Urine, Clean Catch     Color, UA Yellow Yellow, Straw, [place]ola    Appearance, UA Clear Clear    pH, UA >8.0 (A) 5.0 - 8.0    Specific Gravity, UA 1.010 1.005 - 1.030    Protein, UA Negative Negative    Glucose, UA Negative Negative    Ketones, UA Negative Negative    Bilirubin (UA) Negative Negative    Occult Blood UA Negative Negative    Nitrite, UA Negative Negative    Urobilinogen, UA Negative <2.0 EU/dL    Leukocytes, UA Negative Negative   Pregnancy, urine rapid (UPT)   Result Value Ref Range    Preg Test, Ur Negative        Imaging Results:  Imaging Results          CT Abdomen Pelvis With Contrast (Final result)  Result time 09/11/19 13:43:28    Final result by Johnnie Villatoro MD (09/11/19 13:43:28)                 Impression:      No acute abnormality identified.  No intra-abdominal abscess, free fluid, or free air. Mild gaseous distention of cecum and right colon.  There is large volume stool within the transverse colon.  The wide neck umbilical hernia is again noted, containing a prominent stool-containing loop of transverse colon.  No small or large bowel obstruction.  Moderate volume stool at the  splenic flexure and descending colon.      Electronically signed by: Johnnie Villatoro  Date:    09/11/2019  Time:    13:43             Narrative:    EXAMINATION:  CT ABDOMEN PELVIS WITH CONTRAST    CLINICAL HISTORY:  Abdominal distension;    TECHNIQUE:  Low dose axial images, sagittal and coronal reformations were obtained from the lung bases to the pubic symphysis following the IV administration of 75 mL of Omnipaque 350 .  Oral contrast was not administered.    COMPARISON:  07/11/2019    FINDINGS:  ABDOMEN:    - Lung bases: No infiltrates and no nodules.    - Liver: No focal mass.    - Gallbladder: Surgically absent.    - Bile Ducts: No evidence of intra or extra hepatic biliary ductal dilation.    - Spleen: Negative.    - Kidneys: No mass or hydronephrosis.    - Adrenals: Unremarkable.    - Pancreas: No mass or peripancreatic fat stranding.    - Retroperitoneum:  No significant adenopathy.    - Vascular: No abdominal aortic aneurysm.    - Abdominal wall:  Satisfactory position of the percutaneous gastrostomy tube.    PELVIS:    No pelvic mass, adenopathy, or free fluid.    BOWEL/MESENTERY:    Stomach, duodenum, small bowel unremarkable.  Normal appendix.  Mild gaseous distention of cecum and right colon.  There is large volume stool within the transverse colon.  The wide neck umbilical hernia is again noted, containing a prominent stool-containing loop of transverse colon.  No bowel obstruction.  Moderate volume stool at the splenic flexure and descending colon.  Sigmoid and rectum unremarkable..    BONES:  No acute osseous abnormality and no suspicious lytic or blastic lesion.  L4 through S1 and sacral fixation hardware again noted.    All CT scans at this facility use dose modulation, iterative reconstruction and/or weight based dosing when appropriate to reduce radiation dose to as low as reasonably achievable.                                        The Emergency Provider reviewed the vital signs and test results,  which are outlined above.     ED Discussion     2:01 PM: Reassessed pt at this time. Discussed with pt all pertinent ED information and results. Discussed pt dx and plan of tx. Gave pt all f/u and return to the ED instructions. All questions and concerns were addressed at this time. Pt expresses understanding of information and instructions, and is comfortable with plan to discharge. Pt is stable for discharge.    I discussed with patient and/or family/caretaker that evaluation in the ED does not suggest any emergent or life threatening medical conditions requiring immediate intervention beyond what was provided in the ED, and I believe patient is safe for discharge.  Regardless, an unremarkable evaluation in the ED does not preclude the development or presence of a serious of life threatening condition. As such, patient was instructed to return immediately for any worsening or change in current symptoms.       Medical Decision Making:   Clinical Tests:   Lab Tests: Ordered and Reviewed  Radiological Study: Ordered and Reviewed           ED Medication(s):  Medications   morphine injection 4 mg (has no administration in time range)   ondansetron injection 4 mg (has no administration in time range)   sodium chloride 0.9% bolus 1,000 mL (1,000 mLs Intravenous New Bag 9/11/19 1232)   lorazepam (ATIVAN) injection 1 mg (1 mg Intravenous Given 9/11/19 1232)   ondansetron injection 4 mg (4 mg Intravenous Given 9/11/19 1232)   iohexol (OMNIPAQUE 350) injection 75 mL (75 mLs Intravenous Given 9/11/19 1327)       New Prescriptions    ONDANSETRON (ZOFRAN-ODT) 4 MG TBDL    Take 1 tablet (4 mg total) by mouth every 8 (eight) hours as needed (prn nausea vomiting).       Follow-up Information     Akanksha West MD. Call in 1 day.    Specialty:  Internal Medicine  Why:  to schedule appt for recheck - also call your Gi specialist in [place] for appt if your symptoms persist  Contact information:  [address]  [address]  [facility] [address]  [phone]                       Scribe Attestation:   Scribe #1: I performed the above scribed service and the documentation accurately describes the services I performed. I attest to the accuracy of the note.     Attending:   Physician Attestation Statement for Scribe #1: I, Clem Lovell Jr., MD, personally performed the services described in this documentation, as scribed by Idania Cabrales, in my presence, and it is both accurate and complete.           Clinical Impression       ICD-10-CM ICD-9-CM   1. Chronic abdominal pain R10.9 789.00    G89.29 338.29   2. Chronic constipation K59.09 564.00       Disposition:   Disposition: Discharged  Condition: Stable         Clem Lovell Jr., MD  09/11/19 2004

## 2019-09-11 NOTE — ED NOTES
Pt presents to ED today with c/o abdominal pain. Associated symptoms include constipation, passing gas, & nausea. Patient denies any chest pain, fever, chills, sore throat, cough, diarrhea, SOB, HA, syncope, weakness, light-headedness, numbness, seizures, and all other sxs at this time.    Patient identifiers verified and correct for Lia Avila.    Level of Consciousness: The patient is awake, alert and aware of environment with an appropriate affect, the patient is oriented x 3 and speaking appropriately.  Appearance: Patient resting comfortably and in no acute distress, patient is clean and well groomed, patient's clothing is properly fastened.  Skin: The skin is warm and dry, color consistent with ethnicity, patient has normal skin turgor and moist mucus membranes, skin intact, no breakdown or bruising noted.  Musculoskeletal: Moves all extremities well in full range of motion. No obvious deformities or swelling noted.  Respiratory: Airway open and patent, respirations spontaneous, even and unlabored. No accessory muscles in use. Breath sounds clear & equal  Cardiac: Patient has a normal rate, no periphreal edema noted, capillary refill < 3 seconds. good pulses palpated peripherally.  Abdomen: Soft, non-tender to palpation. No distention noted. +LLQ abdominal pain +PEG tube in place +pt reports no BM in 2 weeks +pt reports decreased appetite  Neurologic: PERRLA, face exhibits symmetrical expression, hand grasps equal and even bilaterally, reports normal sensation to all extremities and face.  Psychiatric/Behavioral: Normal affect. Good eye contact. Appropriate in content. Negative for agitation and self-injury.           Patient verbalized understanding of status and plan of care. Patient changed into hospital gown with assistance. Side rails up x 2, call light in reach, bed low and locked. Cardiac monitor applied to patient; alarms on & audible. WCTM.

## 2019-09-25 ENCOUNTER — HOSPITAL ENCOUNTER (EMERGENCY)
Facility: HOSPITAL | Age: 39
Discharge: HOME | End: 2019-09-25
Attending: EMERGENCY MEDICINE
Payer: COMMERCIAL

## 2019-09-25 VITALS
DIASTOLIC BLOOD PRESSURE: 56 MMHG | BODY MASS INDEX: 24.54 KG/M2 | HEART RATE: 66 BPM | WEIGHT: 133.38 LBS | OXYGEN SATURATION: 99 % | SYSTOLIC BLOOD PRESSURE: 105 MMHG | RESPIRATION RATE: 16 BRPM | HEIGHT: 62 IN | TEMPERATURE: 98 F

## 2019-09-25 DIAGNOSIS — R10.84 GENERALIZED ABDOMINAL PAIN: Primary | ICD-10-CM

## 2019-09-25 DIAGNOSIS — R11.2 NON-INTRACTABLE VOMITING WITH NAUSEA, UNSPECIFIED VOMITING TYPE: ICD-10-CM

## 2019-09-25 DIAGNOSIS — R10.9 ABDOMINAL PAIN: ICD-10-CM

## 2019-09-25 LAB
ALBUMIN SERPL BCP-MCNC: 3.7 G/DL (ref 3.5–5.2)
ALP SERPL-CCNC: 56 U/L (ref 55–135)
ALT SERPL W/O P-5'-P-CCNC: 7 U/L (ref 10–44)
ANION GAP SERPL CALC-SCNC: 10 MMOL/L (ref 8–16)
AST SERPL-CCNC: 12 U/L (ref 10–40)
BASOPHILS # BLD AUTO: 0.02 K/UL (ref 0–0.2)
BASOPHILS NFR BLD: 0.3 % (ref 0–1.9)
BILIRUB SERPL-MCNC: 0.4 MG/DL (ref 0.1–1)
BUN SERPL-MCNC: 10 MG/DL (ref 6–20)
CALCIUM SERPL-MCNC: 9.5 MG/DL (ref 8.7–10.5)
CHLORIDE SERPL-SCNC: 111 MMOL/L (ref 95–110)
CO2 SERPL-SCNC: 20 MMOL/L (ref 23–29)
CREAT SERPL-MCNC: 0.7 MG/DL (ref 0.5–1.4)
DIFFERENTIAL METHOD: NORMAL
EOSINOPHIL # BLD AUTO: 0.1 K/UL (ref 0–0.5)
EOSINOPHIL NFR BLD: 0.8 % (ref 0–8)
ERYTHROCYTE [DISTWIDTH] IN BLOOD BY AUTOMATED COUNT: 14.3 % (ref 11.5–14.5)
EST. GFR  (AFRICAN AMERICAN): >60 ML/MIN/1.73 M^2
EST. GFR  (NON AFRICAN AMERICAN): >60 ML/MIN/1.73 M^2
GLUCOSE SERPL-MCNC: 90 MG/DL (ref 70–110)
HCT VFR BLD AUTO: 37.9 % (ref 37–48.5)
HGB BLD-MCNC: 12.8 G/DL (ref 12–16)
LIPASE SERPL-CCNC: 14 U/L (ref 4–60)
LYMPHOCYTES # BLD AUTO: 1.8 K/UL (ref 1–4.8)
LYMPHOCYTES NFR BLD: 23.5 % (ref 18–48)
MCH RBC QN AUTO: 28.1 PG (ref 27–31)
MCHC RBC AUTO-ENTMCNC: 33.8 G/DL (ref 32–36)
MCV RBC AUTO: 83 FL (ref 82–98)
MONOCYTES # BLD AUTO: 0.6 K/UL (ref 0.3–1)
MONOCYTES NFR BLD: 8.6 % (ref 4–15)
NEUTROPHILS # BLD AUTO: 5 K/UL (ref 1.8–7.7)
NEUTROPHILS NFR BLD: 66.9 % (ref 38–73)
PLATELET # BLD AUTO: 210 K/UL (ref 150–350)
PMV BLD AUTO: 10.7 FL (ref 9.2–12.9)
POTASSIUM SERPL-SCNC: 3.4 MMOL/L (ref 3.5–5.1)
PROT SERPL-MCNC: 6.2 G/DL (ref 6–8.4)
RBC # BLD AUTO: 4.55 M/UL (ref 4–5.4)
SODIUM SERPL-SCNC: 141 MMOL/L (ref 136–145)
WBC # BLD AUTO: 7.44 K/UL (ref 3.9–12.7)

## 2019-09-25 PROCEDURE — 96376 TX/PRO/DX INJ SAME DRUG ADON: CPT

## 2019-09-25 PROCEDURE — 63600175 PHARM REV CODE 636 W HCPCS: Performed by: EMERGENCY MEDICINE

## 2019-09-25 PROCEDURE — 96375 TX/PRO/DX INJ NEW DRUG ADDON: CPT

## 2019-09-25 PROCEDURE — 85025 COMPLETE CBC W/AUTO DIFF WBC: CPT

## 2019-09-25 PROCEDURE — 99284 EMERGENCY DEPT VISIT MOD MDM: CPT | Mod: 25

## 2019-09-25 PROCEDURE — 96374 THER/PROPH/DIAG INJ IV PUSH: CPT

## 2019-09-25 PROCEDURE — 80053 COMPREHEN METABOLIC PANEL: CPT

## 2019-09-25 PROCEDURE — 83690 ASSAY OF LIPASE: CPT

## 2019-09-25 PROCEDURE — 36415 COLL VENOUS BLD VENIPUNCTURE: CPT

## 2019-09-25 PROCEDURE — 96361 HYDRATE IV INFUSION ADD-ON: CPT

## 2019-09-25 RX ORDER — MORPHINE SULFATE 4 MG/ML
2 INJECTION, SOLUTION INTRAMUSCULAR; INTRAVENOUS
Status: COMPLETED | OUTPATIENT
Start: 2019-09-25 | End: 2019-09-25

## 2019-09-25 RX ORDER — ONDANSETRON 2 MG/ML
4 INJECTION INTRAMUSCULAR; INTRAVENOUS
Status: COMPLETED | OUTPATIENT
Start: 2019-09-25 | End: 2019-09-25

## 2019-09-25 RX ORDER — MORPHINE SULFATE 4 MG/ML
4 INJECTION, SOLUTION INTRAMUSCULAR; INTRAVENOUS
Status: COMPLETED | OUTPATIENT
Start: 2019-09-25 | End: 2019-09-25

## 2019-09-25 RX ADMIN — MORPHINE SULFATE 2 MG: 4 INJECTION, SOLUTION INTRAMUSCULAR; INTRAVENOUS at 10:09

## 2019-09-25 RX ADMIN — ONDANSETRON 4 MG: 2 INJECTION INTRAMUSCULAR; INTRAVENOUS at 08:09

## 2019-09-25 RX ADMIN — SODIUM CHLORIDE 1000 ML: 0.9 INJECTION, SOLUTION INTRAVENOUS at 08:09

## 2019-09-25 RX ADMIN — MORPHINE SULFATE 4 MG: 4 INJECTION, SOLUTION INTRAMUSCULAR; INTRAVENOUS at 08:09

## 2019-09-26 NOTE — ED PROVIDER NOTES
SCRIBE #1 NOTE: I, Melany Lee, am scribing for, and in the presence of, New Ahuja Jr., MD. I have scribed the entire note.       History     Chief Complaint   Patient presents with    Abdominal Pain     pt reports constipation for about 2 weeks with no BM; pt reports generalized body aches and abd pain     Review of patient's allergies indicates:   Allergen Reactions    Reglan [metoclopramide] Other (See Comments)     Tardive dyskinesia    Sulfa (sulfonamide antibiotics)          History of Present Illness     HPI    9/25/2019, 7:41 PM  History obtained from the patient      History of Present Illness: Lia Avila is a 39 y.o. female patient with a PMHx of IBS, acid reflux, HLD and PSHx of cholecystectomy, colon surgery, colonoscopy, small intestine surgery, gastrostomy-jejusnostomy tube, and esophogeal manometry who presents to the Emergency Department for evaluation of chronic abdominal pain which onset gradually yesterday. This is a recurrent problem, pt was last seen in the ED on 9/11/19. Pt reports she has not had a BM for 2 weeks. Symptoms are constant and moderate in severity. No mitigating or exacerbating factors reported. Associated sxs include n/v and chronic constipation. Patient denies any fever, chills, hematochezia, diarrhea, dysuria, urinary frequency/urgency, hematuria, dizziness, extremity weakness/numbness, and all other sxs at this time. No prior tx outside of home medications reported. No further complaints or concerns at this time.      Arrival mode: Personal vehicle      PCP: Akanksha West MD        Past Medical History:  Past Medical History:   Diagnosis Date    Acid reflux     Aerophagia     Alcoholic     recovering    Anxiety     Depression     Functional gastrointestinal disorder     Hyperlipidemia     IBS (irritable bowel syndrome)     Irritable bowel syndrome     Sleep apnea     Trivial aortic valve anomaly        Past Surgical History:  Past Surgical  History:   Procedure Laterality Date    BACK SURGERY      fusion of L4, L5, and S1    CHOLECYSTECTOMY      COLON SURGERY      COLONOSCOPY      COLONOSCOPY N/A 2018    Procedure: Colonoscopy;  Surgeon: Apurva Salinas MD;  [facility]  Service: Endoscopy;  Laterality: N/A;  5 days of full liquids then 24 hours clear liquids    ESOPHAGEAL MANOMETRY WITH MEASUREMENT OF IMPEDANCE N/A 10/8/2018    Procedure: MANOMETRY, ESOPHAGUS, WITH IMPEDANCE MEASUREMENT;  Surgeon: Apurva Salinas MD;  Location: [facility] ([address] [place] [address]);  Service: Endoscopy;  Laterality: N/A;    GASTROSTOMY-JEJEUNOSTOMY TUBE CHANGE/PLACEMENT      SMALL INTESTINE SURGERY      UPPER GASTROINTESTINAL ENDOSCOPY           Family History:  Family History   Problem Relation Age of Onset    Hypertension Mother     Cancer Father 49        stomach CA    Stomach cancer Father 49    Liver cancer Father     Rectal cancer Maternal Grandmother         dx in 70s    Celiac disease Neg Hx     Cirrhosis Neg Hx     Colon cancer Neg Hx     Colon polyps Neg Hx     Crohn's disease Neg Hx     Cystic fibrosis Neg Hx     Esophageal cancer Neg Hx     Hemochromatosis Neg Hx     Inflammatory bowel disease Neg Hx     Irritable bowel syndrome Neg Hx     Liver disease Neg Hx     Ulcerative colitis Neg Hx     Dain's disease Neg Hx     Lymphoma Neg Hx     Tuberculosis Neg Hx     Scleroderma Neg Hx     Rheum arthritis Neg Hx     Multiple sclerosis Neg Hx     Melanoma Neg Hx     Lupus Neg Hx     Psoriasis Neg Hx     Skin cancer Neg Hx        Social History:  Social History     Tobacco Use    Smoking status: Smoker, Current Status Unknown     Packs/day: 0.25     Types: Cigarettes     Last attempt to quit: 12/3/2016     Years since quittin.8    Smokeless tobacco: Never Used   Substance and Sexual Activity    Alcohol use: No     Comment: pt states that she is a recoveirng alcoholic, last drink 11    Drug use: Not  Currently     Types: Marijuana    Sexual activity: Unknown          Review of Systems     Review of Systems   Constitutional: Negative for chills and fever.   HENT: Negative for sore throat.    Respiratory: Negative for shortness of breath.    Cardiovascular: Negative for chest pain.   Gastrointestinal: Positive for abdominal pain (chronic), constipation (chronic), nausea and vomiting. Negative for blood in stool and diarrhea.   Genitourinary: Negative for dysuria, frequency, hematuria and urgency.   Musculoskeletal: Negative for back pain.   Skin: Negative for rash.   Neurological: Negative for dizziness and weakness.   Hematological: Does not bruise/bleed easily.   All other systems reviewed and are negative.       Physical Exam     Initial Vitals [09/25/19 1913]   BP Pulse Resp Temp SpO2   97/63 87 (!) 25 98.3 °F (36.8 °C) 100 %      MAP       --          Physical Exam  Nursing Notes and Vital Signs Reviewed.  Constitutional: Patient is in no acute distress. Well-developed and well-nourished.  Head: Atraumatic. Normocephalic.  Eyes: PERRL. EOM intact. Conjunctivae are not pale. No scleral icterus.  ENT: Mucous membranes are moist. Oropharynx is clear and symmetric.    Neck: Supple. Full ROM. No lymphadenopathy.  Cardiovascular: Regular rate. Regular rhythm. No murmurs, rubs, or gallops. Distal pulses are 2+ and symmetric.  Pulmonary/Chest: No respiratory distress. Clear to auscultation bilaterally. No wheezing or rales.  Abdominal: Soft and non-distended.  There is no tenderness.  No rebound, guarding, or rigidity. Good bowel sounds.   Genitourinary: No CVA tenderness  Musculoskeletal: Moves all extremities. No obvious deformities. No edema. No calf tenderness.  Skin: Warm and dry.  Neurological:  Alert, awake, and appropriate.  Normal speech.  No acute focal neurological deficits are appreciated.  Psychiatric: Normal affect. Good eye contact. Appropriate in content.     ED Course   Procedures  ED Vital  "Signs:  Vitals:    09/25/19 1913 09/25/19 1947   BP: 97/63 (!) 109/53   Pulse: 87 66   Resp: (!) 25    Temp: 98.3 °F (36.8 °C)    TempSrc: Oral    SpO2: 100% 100%   Weight: 60.5 kg (133 lb 6.1 oz)    Height: 5' 2" (1.575 m)        Abnormal Lab Results:  Labs Reviewed   COMPREHENSIVE METABOLIC PANEL - Abnormal; Notable for the following components:       Result Value    Potassium 3.4 (*)     Chloride 111 (*)     CO2 20 (*)     ALT 7 (*)     All other components within normal limits   CBC W/ AUTO DIFFERENTIAL   LIPASE        All Lab Results:  Results for orders placed or performed during the hospital encounter of 09/25/19   CBC auto differential   Result Value Ref Range    WBC 7.44 3.90 - 12.70 K/uL    RBC 4.55 4.00 - 5.40 M/uL    Hemoglobin 12.8 12.0 - 16.0 g/dL    Hematocrit 37.9 37.0 - 48.5 %    Mean Corpuscular Volume 83 82 - 98 fL    Mean Corpuscular Hemoglobin 28.1 27.0 - 31.0 pg    Mean Corpuscular Hemoglobin Conc 33.8 32.0 - 36.0 g/dL    RDW 14.3 11.5 - 14.5 %    Platelets 210 150 - 350 K/uL    MPV 10.7 9.2 - 12.9 fL    Gran # (ANC) 5.0 1.8 - 7.7 K/uL    Lymph # 1.8 1.0 - 4.8 K/uL    Mono # 0.6 0.3 - 1.0 K/uL    Eos # 0.1 0.0 - 0.5 K/uL    Baso # 0.02 0.00 - 0.20 K/uL    Gran% 66.9 38.0 - 73.0 %    Lymph% 23.5 18.0 - 48.0 %    Mono% 8.6 4.0 - 15.0 %    Eosinophil% 0.8 0.0 - 8.0 %    Basophil% 0.3 0.0 - 1.9 %    Differential Method Automated    Lipase   Result Value Ref Range    Lipase 14 4 - 60 U/L   Comprehensive metabolic panel   Result Value Ref Range    Sodium 141 136 - 145 mmol/L    Potassium 3.4 (L) 3.5 - 5.1 mmol/L    Chloride 111 (H) 95 - 110 mmol/L    CO2 20 (L) 23 - 29 mmol/L    Glucose 90 70 - 110 mg/dL    BUN, Bld 10 6 - 20 mg/dL    Creatinine 0.7 0.5 - 1.4 mg/dL    Calcium 9.5 8.7 - 10.5 mg/dL    Total Protein 6.2 6.0 - 8.4 g/dL    Albumin 3.7 3.5 - 5.2 g/dL    Total Bilirubin 0.4 0.1 - 1.0 mg/dL    Alkaline Phosphatase 56 55 - 135 U/L    AST 12 10 - 40 U/L    ALT 7 (L) 10 - 44 U/L    Anion Gap 10 " 8 - 16 mmol/L    eGFR if African American >60 >60 mL/min/1.73 m^2    eGFR if non African American >60 >60 mL/min/1.73 m^2         Imaging Results:  Imaging Results          X-Ray Abdomen Flat And Erect (Final result)  Result time 09/25/19 21:06:41    Final result by Gabe Swenson MD (Timothy) (09/25/19 21:06:41)                 Impression:      No evidence of bowel obstruction.      Electronically signed by: Gabe Swenson MD  Date:    09/25/2019  Time:    21:06             Narrative:    EXAMINATION:  XR ABDOMEN FLAT AND ERECT    CLINICAL HISTORY:  Unspecified abdominal pain    COMPARISON:  None.    FINDINGS:  Two views of the abdomen. Peg tube is in place.  There is gas noted in the small bowel and colon.  Findings are not suspicious for obstruction.  Previous cholecystectomy.    Previous lower lumbar spine and pelvic surgery bilaterally.    Bony structures are grossly normal.                                          The Emergency Provider reviewed the vital signs and test results, which are outlined above.     ED Discussion     9:27 PM: Reassessed pt at this time.  Pt states her condition has improved at this time. Discussed with pt all pertinent ED information and results. Discussed pt dx and plan of tx. Gave pt all f/u and return to the ED instructions. All questions and concerns were addressed at this time. Pt expresses understanding of information and instructions, and is comfortable with plan to discharge. Pt is stable for discharge.    I discussed with patient and/or family/caretaker that evaluation in the ED does not suggest any emergent or life threatening medical conditions requiring immediate intervention beyond what was provided in the ED, and I believe patient is safe for discharge.  Regardless, an unremarkable evaluation in the ED does not preclude the development or presence of a serious of life threatening condition. As such, patient was instructed to return immediately for any worsening or  change in current symptoms.    10:01 PM  Patient is stable nontoxic.  Patient has chronic abdominal pain with nausea vomiting or symptoms or control.  She is also on pain management.  She is not obstructed on x-ray.  She is stable and safe for discharge in my opinion.       Medical Decision Making:   Clinical Tests:   Lab Tests: Ordered and Reviewed  Radiological Study: Ordered and Reviewed           ED Medication(s):  Medications   sodium chloride 0.9% bolus 1,000 mL (1,000 mLs Intravenous New Bag 9/25/19 2015)   ondansetron injection 4 mg (4 mg Intravenous Given 9/25/19 2016)   morphine injection 4 mg (4 mg Intravenous Given 9/25/19 2016)       New Prescriptions    No medications on file               Scribe Attestation:   Scribe #1: I performed the above scribed service and the documentation accurately describes the services I performed. I attest to the accuracy of the note.     Attending:   Physician Attestation Statement for Scribe #1: I, New Ahuja Jr., MD, personally performed the services described in this documentation, as scribed by Melany Lee, in my presence, and it is both accurate and complete.           Clinical Impression       ICD-10-CM ICD-9-CM   1. Abdominal pain R10.9 789.00       Disposition:   Disposition: Discharged  Condition: Stable         New Ahuja Jr., MD  09/25/19 [id number]

## 2019-09-26 NOTE — DISCHARGE INSTRUCTIONS
Medications.  Follow up with her doctor tomorrow for re-evaluation.  Return as needed for any worsening symptoms, problems, questions or concerns.

## 2019-11-04 ENCOUNTER — TELEPHONE (OUTPATIENT)
Dept: GASTROENTEROLOGY | Facility: CLINIC | Age: 39
End: 2019-11-04

## 2019-11-04 RX ORDER — LINACLOTIDE 290 UG/1
CAPSULE, GELATIN COATED ORAL
Qty: 30 CAPSULE | Refills: 0 | OUTPATIENT
Start: 2019-11-04

## 2019-11-04 RX ORDER — ONDANSETRON 4 MG/1
TABLET, ORALLY DISINTEGRATING ORAL
Qty: 60 TABLET | Refills: 0 | OUTPATIENT
Start: 2019-11-04

## 2019-11-04 NOTE — TELEPHONE ENCOUNTER
Pls let pt know that she needs to ask her current Gi Dr or PCP to refill zofran as she is not longer under my care. I refused the refill request

## 2019-11-04 NOTE — TELEPHONE ENCOUNTER
Message left for Ms. Avila letting her know that since she is no longer under Dr Salinas's care she should speak with her local GI doctor or her PCP to refill the Zofran Rx. Sharon Rebolledo RN

## 2019-11-08 ENCOUNTER — NURSE TRIAGE (OUTPATIENT)
Dept: ADMINISTRATIVE | Facility: CLINIC | Age: 39
End: 2019-11-08

## 2019-11-08 ENCOUNTER — HOSPITAL ENCOUNTER (EMERGENCY)
Facility: HOSPITAL | Age: 39
Discharge: HOME | End: 2019-11-08
Attending: FAMILY MEDICINE
Payer: COMMERCIAL

## 2019-11-08 VITALS
RESPIRATION RATE: 20 BRPM | TEMPERATURE: 98 F | OXYGEN SATURATION: 98 % | HEIGHT: 62 IN | BODY MASS INDEX: 24.87 KG/M2 | WEIGHT: 135.13 LBS | SYSTOLIC BLOOD PRESSURE: 115 MMHG | DIASTOLIC BLOOD PRESSURE: 55 MMHG | HEART RATE: 74 BPM

## 2019-11-08 DIAGNOSIS — R10.9 ABDOMINAL PAIN: Primary | ICD-10-CM

## 2019-11-08 LAB
ALBUMIN SERPL BCP-MCNC: 4.5 G/DL (ref 3.5–5.2)
ALP SERPL-CCNC: 57 U/L (ref 55–135)
ALT SERPL W/O P-5'-P-CCNC: 5 U/L (ref 10–44)
AMPHET+METHAMPHET UR QL: NORMAL
AMYLASE SERPL-CCNC: 65 U/L (ref 20–110)
ANION GAP SERPL CALC-SCNC: 12 MMOL/L (ref 8–16)
AST SERPL-CCNC: 14 U/L (ref 10–40)
BARBITURATES UR QL SCN>200 NG/ML: NEGATIVE
BASOPHILS # BLD AUTO: 0.03 K/UL (ref 0–0.2)
BASOPHILS NFR BLD: 0.4 % (ref 0–1.9)
BENZODIAZ UR QL SCN>200 NG/ML: NORMAL
BILIRUB SERPL-MCNC: 0.4 MG/DL (ref 0.1–1)
BILIRUB UR QL STRIP: NEGATIVE
BUN SERPL-MCNC: 12 MG/DL (ref 6–20)
BZE UR QL SCN: NEGATIVE
CALCIUM SERPL-MCNC: 10 MG/DL (ref 8.7–10.5)
CANNABINOIDS UR QL SCN: NORMAL
CHLORIDE SERPL-SCNC: 104 MMOL/L (ref 95–110)
CLARITY UR: CLEAR
CO2 SERPL-SCNC: 27 MMOL/L (ref 23–29)
COLOR UR: YELLOW
CREAT SERPL-MCNC: 0.8 MG/DL (ref 0.5–1.4)
CREAT UR-MCNC: 138.3 MG/DL (ref 15–325)
DIFFERENTIAL METHOD: ABNORMAL
EOSINOPHIL # BLD AUTO: 0 K/UL (ref 0–0.5)
EOSINOPHIL NFR BLD: 0.5 % (ref 0–8)
ERYTHROCYTE [DISTWIDTH] IN BLOOD BY AUTOMATED COUNT: 13.1 % (ref 11.5–14.5)
EST. GFR  (AFRICAN AMERICAN): >60 ML/MIN/1.73 M^2
EST. GFR  (NON AFRICAN AMERICAN): >60 ML/MIN/1.73 M^2
GLUCOSE SERPL-MCNC: 89 MG/DL (ref 70–110)
GLUCOSE UR QL STRIP: NEGATIVE
HCT VFR BLD AUTO: 41.2 % (ref 37–48.5)
HGB BLD-MCNC: 12.8 G/DL (ref 12–16)
HGB UR QL STRIP: NEGATIVE
IMM GRANULOCYTES # BLD AUTO: 0.03 K/UL (ref 0–0.04)
IMM GRANULOCYTES NFR BLD AUTO: 0.4 % (ref 0–0.5)
KETONES UR QL STRIP: NEGATIVE
LEUKOCYTE ESTERASE UR QL STRIP: NEGATIVE
LIPASE SERPL-CCNC: 22 U/L (ref 4–60)
LYMPHOCYTES # BLD AUTO: 1.6 K/UL (ref 1–4.8)
LYMPHOCYTES NFR BLD: 19.6 % (ref 18–48)
MCH RBC QN AUTO: 26.8 PG (ref 27–31)
MCHC RBC AUTO-ENTMCNC: 31.1 G/DL (ref 32–36)
MCV RBC AUTO: 86 FL (ref 82–98)
METHADONE UR QL SCN>300 NG/ML: NEGATIVE
MONOCYTES # BLD AUTO: 0.6 K/UL (ref 0.3–1)
MONOCYTES NFR BLD: 7 % (ref 4–15)
NEUTROPHILS # BLD AUTO: 5.7 K/UL (ref 1.8–7.7)
NEUTROPHILS NFR BLD: 72.1 % (ref 38–73)
NITRITE UR QL STRIP: NEGATIVE
NRBC BLD-RTO: 0 /100 WBC
OPIATES UR QL SCN: NEGATIVE
PCP UR QL SCN>25 NG/ML: NEGATIVE
PH UR STRIP: 8 [PH] (ref 5–8)
PLATELET # BLD AUTO: 229 K/UL (ref 150–350)
PMV BLD AUTO: 11.6 FL (ref 9.2–12.9)
POTASSIUM SERPL-SCNC: 3.8 MMOL/L (ref 3.5–5.1)
PROT SERPL-MCNC: 7.6 G/DL (ref 6–8.4)
PROT UR QL STRIP: NEGATIVE
RBC # BLD AUTO: 4.77 M/UL (ref 4–5.4)
SODIUM SERPL-SCNC: 143 MMOL/L (ref 136–145)
SP GR UR STRIP: 1.01 (ref 1–1.03)
TOXICOLOGY INFORMATION: NORMAL
URN SPEC COLLECT METH UR: NORMAL
UROBILINOGEN UR STRIP-ACNC: NEGATIVE EU/DL
WBC # BLD AUTO: 7.91 K/UL (ref 3.9–12.7)

## 2019-11-08 PROCEDURE — 93010 EKG 12-LEAD: ICD-10-PCS | Mod: ,,, | Performed by: INTERNAL MEDICINE

## 2019-11-08 PROCEDURE — 80053 COMPREHEN METABOLIC PANEL: CPT

## 2019-11-08 PROCEDURE — 99285 EMERGENCY DEPT VISIT HI MDM: CPT | Mod: 25

## 2019-11-08 PROCEDURE — 96365 THER/PROPH/DIAG IV INF INIT: CPT

## 2019-11-08 PROCEDURE — 80307 DRUG TEST PRSMV CHEM ANLYZR: CPT

## 2019-11-08 PROCEDURE — 93005 ELECTROCARDIOGRAM TRACING: CPT

## 2019-11-08 PROCEDURE — 96375 TX/PRO/DX INJ NEW DRUG ADDON: CPT

## 2019-11-08 PROCEDURE — 63600175 PHARM REV CODE 636 W HCPCS: Performed by: FAMILY MEDICINE

## 2019-11-08 PROCEDURE — 85025 COMPLETE CBC W/AUTO DIFF WBC: CPT

## 2019-11-08 PROCEDURE — 81003 URINALYSIS AUTO W/O SCOPE: CPT | Mod: 59

## 2019-11-08 PROCEDURE — 83690 ASSAY OF LIPASE: CPT

## 2019-11-08 PROCEDURE — 93010 ELECTROCARDIOGRAM REPORT: CPT | Mod: ,,, | Performed by: INTERNAL MEDICINE

## 2019-11-08 PROCEDURE — 82150 ASSAY OF AMYLASE: CPT

## 2019-11-08 RX ORDER — LANSOPRAZOLE 30 MG/1
TABLET, ORALLY DISINTEGRATING, DELAYED RELEASE ORAL
Refills: 2 | COMMUNITY
Start: 2019-08-05 | End: 2021-06-04

## 2019-11-08 RX ORDER — MORPHINE SULFATE 4 MG/ML
4 INJECTION, SOLUTION INTRAMUSCULAR; INTRAVENOUS
Status: COMPLETED | OUTPATIENT
Start: 2019-11-08 | End: 2019-11-08

## 2019-11-08 RX ORDER — LACTULOSE 10 G/15ML
15 SOLUTION ORAL; RECTAL
COMMUNITY
Start: 2019-04-19

## 2019-11-08 RX ORDER — KETOROLAC TROMETHAMINE 30 MG/ML
30 INJECTION, SOLUTION INTRAMUSCULAR; INTRAVENOUS
Status: COMPLETED | OUTPATIENT
Start: 2019-11-08 | End: 2019-11-08

## 2019-11-08 RX ORDER — PANTOPRAZOLE SODIUM 40 MG/1
80 TABLET, DELAYED RELEASE ORAL
COMMUNITY
Start: 2019-10-24 | End: 2020-10-23

## 2019-11-08 RX ADMIN — MORPHINE SULFATE 4 MG: 4 INJECTION, SOLUTION INTRAMUSCULAR; INTRAVENOUS at 06:11

## 2019-11-08 RX ADMIN — PROMETHAZINE HYDROCHLORIDE 25 MG: 25 INJECTION INTRAMUSCULAR; INTRAVENOUS at 04:11

## 2019-11-08 RX ADMIN — KETOROLAC TROMETHAMINE 30 MG: 30 INJECTION, SOLUTION INTRAMUSCULAR; INTRAVENOUS at 04:11

## 2019-11-08 NOTE — ED PROVIDER NOTES
SCRIBE #1 NOTE: I, Marti Delatorre/ Gaby Mei, am scribing for, and in the presence of, Cynthia May MD. I have scribed the entire note.       History     Chief Complaint   Patient presents with    Abdominal Pain     C/o abd pain to LUQ with n/v/d. 12 episodes of loose bloody stool. Pt has Cummins drain in place. Hx of gastroparesis.     Review of patient's allergies indicates:   Allergen Reactions    Sulfa (sulfonamide antibiotics) Rash    Reglan [metoclopramide] Other (See Comments)     Tardive dyskinesia         History of Present Illness     HPI    11/8/2019, 4:11 PM  History obtained from the patient      History of Present Illness: Lia Avila is a 39 y.o. 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 Pt states that she normally has abd pain in RUQ, but today it was worse and located in the LUQ. Pt called the on call nurse line who advised the pt to come to the ED for further evaluation. Symptoms are constant and moderate in severity. No mitigating or exacerbating factors reported. Associated sxs include diarrhea, emesis, nausea, and hematochezia. Patient denies any CP, SOB, fever/chills, HA, dizziness, congestion, cough, constipation, fatigue, dysuria, hematuria, vaginal bleeding/ discharge, hematemesis, and all other sxs at this time. Prior Tx includes Zofran and Phenergan suppositories without sx improvement. No further complaints or concerns at this time.     Arrival mode: Personal vehicle    PCP: Akanksha West MD   Gastroenterologist: Dr. Schwab      Past Medical History:  Past Medical History:   Diagnosis Date    Acid reflux     Aerophagia     Alcoholic      recovering    Anxiety     Depression     Functional gastrointestinal disorder     Hyperlipidemia     IBS (irritable bowel syndrome)     Irritable bowel syndrome     Sleep apnea     Trivial aortic valve anomaly        Past Surgical History:  Past Surgical History:   Procedure Laterality Date    BACK SURGERY      fusion of L4, L5, and S1    CHOLECYSTECTOMY      COLON SURGERY      COLONOSCOPY      COLONOSCOPY N/A 4/13/2018    Procedure: Colonoscopy;  Surgeon: Apurva Salinas MD;  [facility]  Service: Endoscopy;  Laterality: N/A;  5 days of full liquids then 24 hours clear liquids    ESOPHAGEAL MANOMETRY WITH MEASUREMENT OF IMPEDANCE N/A 10/8/2018    Procedure: MANOMETRY, ESOPHAGUS, WITH IMPEDANCE MEASUREMENT;  Surgeon: Apurva Salinas MD;  [facility]  Service: Endoscopy;  Laterality: N/A;    GASTROSTOMY-JEJEUNOSTOMY TUBE CHANGE/PLACEMENT      SMALL INTESTINE SURGERY      UPPER GASTROINTESTINAL ENDOSCOPY           Family History:  Family History   Problem Relation Age of Onset    Hypertension Mother     Cancer Father 49        stomach CA    Stomach cancer Father 49    Liver cancer Father     Rectal cancer Maternal Grandmother         dx in 70s    Celiac disease Neg Hx     Cirrhosis Neg Hx     Colon cancer Neg Hx     Colon polyps Neg Hx     Crohn's disease Neg Hx     Cystic fibrosis Neg Hx     Esophageal cancer Neg Hx     Hemochromatosis Neg Hx     Inflammatory bowel disease Neg Hx     Irritable bowel syndrome Neg Hx     Liver disease Neg Hx     Ulcerative colitis Neg Hx     Dain's disease Neg Hx     Lymphoma Neg Hx     Tuberculosis Neg Hx     Scleroderma Neg Hx     Rheum arthritis Neg Hx     Multiple sclerosis Neg Hx     Melanoma Neg Hx     Lupus Neg Hx     Psoriasis Neg Hx     Skin cancer Neg Hx        Social History:  Social History     Tobacco Use    Smoking status: Smoker, Current Status Unknown     Packs/day: 0.25     Types:  Cigarettes     Last attempt to quit: 12/3/2016     Years since quittin.9    Smokeless tobacco: Never Used   Substance and Sexual Activity    Alcohol use: No     Comment: pt states that she is a recoveirng alcoholic, last drink 11    Drug use: Not Currently     Types: Marijuana    Sexual activity: Unknown        Review of Systems     Review of Systems   Constitutional: Negative for chills and fever.   HENT: Negative for congestion and sore throat.    Respiratory: Negative for cough and shortness of breath.    Cardiovascular: Negative for chest pain.   Gastrointestinal: Positive for abdominal pain (LUQ), blood in stool, diarrhea, nausea and vomiting. Negative for constipation.        - Hematemesis   Genitourinary: Negative for dysuria, hematuria, vaginal bleeding and vaginal discharge.   Musculoskeletal: Negative for back pain.   Skin: Negative for rash.   Neurological: Negative for dizziness, weakness and headaches.   Hematological: Does not bruise/bleed easily.   All other systems reviewed and are negative.       Physical Exam     Initial Vitals   BP Pulse Resp Temp SpO2   19 1557 19 1557 19 1631 19 1557 19 1557   (!) 117/51 85 18 98.1 °F (36.7 °C) 99 %      MAP       --                 Physical Exam  Nursing Notes and Vital Signs Reviewed.  Constitutional: Patient is in no acute distress. Well-developed and well-nourished.  Head: Atraumatic. Normocephalic.  Eyes: PERRL. EOM intact. Conjunctivae are not pale. No scleral icterus.  ENT: Mucous membranes are moist. Oropharynx is clear and symmetric.    Neck: Supple. Full ROM. No lymphadenopathy.  Cardiovascular: Regular rate. Regular rhythm. No murmurs, rubs, or gallops. Distal pulses are 2+ and symmetric.  Pulmonary/Chest: No respiratory distress. Clear to auscultation bilaterally. No wheezing or rales.  Abdominal: Multiple soft and reducible hernias. Diffuse tenderness in all 4 quadrants.  No rebound, guarding, or rigidity.  "Good bowel sounds. LUQ Cummins bag with air and stomach fluid.  Genitourinary: No CVA tenderness  Musculoskeletal: Moves all extremities. No obvious deformities. No edema. No calf tenderness.  Skin: Warm and dry.  Neurological:  Alert, awake, and appropriate.  Normal speech.  No acute focal neurological deficits are appreciated.  Psychiatric: Normal affect. Good eye contact. Appropriate in content.     ED Course   Procedures  ED Vital Signs:  Vitals:    11/08/19 1557 11/08/19 1631 11/08/19 1634 11/08/19 1731   BP: (!) 117/51 (!) 123/58  123/66   Pulse: 85 73 73 76   Resp:  18  20   Temp: 98.1 °F (36.7 °C)   98.1 °F (36.7 °C)   TempSrc: Oral   Oral   SpO2: 99% 100%  100%   Weight: 61.3 kg (135 lb 2.3 oz)      Height: 5' 2" (1.575 m)       11/08/19 1850   BP: (!) 115/55   Pulse: 74   Resp: 20   Temp:    TempSrc:    SpO2: 98%   Weight:    Height:        Abnormal Lab Results:  Labs Reviewed   CBC W/ AUTO DIFFERENTIAL - Abnormal; Notable for the following components:       Result Value    Mean Corpuscular Hemoglobin 26.8 (*)     Mean Corpuscular Hemoglobin Conc 31.1 (*)     All other components within normal limits   COMPREHENSIVE METABOLIC PANEL - Abnormal; Notable for the following components:    ALT 5 (*)     All other components within normal limits   URINALYSIS   DRUG SCREEN PANEL, URINE EMERGENCY   LIPASE   AMYLASE   PREGNANCY TEST, URINE RAPID        All Lab Results:  Results for orders placed or performed during the hospital encounter of 11/08/19   CBC auto differential   Result Value Ref Range    WBC 7.91 3.90 - 12.70 K/uL    RBC 4.77 4.00 - 5.40 M/uL    Hemoglobin 12.8 12.0 - 16.0 g/dL    Hematocrit 41.2 37.0 - 48.5 %    Mean Corpuscular Volume 86 82 - 98 fL    Mean Corpuscular Hemoglobin 26.8 (L) 27.0 - 31.0 pg    Mean Corpuscular Hemoglobin Conc 31.1 (L) 32.0 - 36.0 g/dL    RDW 13.1 11.5 - 14.5 %    Platelets 229 150 - 350 K/uL    MPV 11.6 9.2 - 12.9 fL    Immature Granulocytes 0.4 0.0 - 0.5 %    Gran # (ANC) " 5.7 1.8 - 7.7 K/uL    Immature Grans (Abs) 0.03 0.00 - 0.04 K/uL    Lymph # 1.6 1.0 - 4.8 K/uL    Mono # 0.6 0.3 - 1.0 K/uL    Eos # 0.0 0.0 - 0.5 K/uL    Baso # 0.03 0.00 - 0.20 K/uL    nRBC 0 0 /100 WBC    Gran% 72.1 38.0 - 73.0 %    Lymph% 19.6 18.0 - 48.0 %    Mono% 7.0 4.0 - 15.0 %    Eosinophil% 0.5 0.0 - 8.0 %    Basophil% 0.4 0.0 - 1.9 %    Differential Method Automated    Comprehensive metabolic panel   Result Value Ref Range    Sodium 143 136 - 145 mmol/L    Potassium 3.8 3.5 - 5.1 mmol/L    Chloride 104 95 - 110 mmol/L    CO2 27 23 - 29 mmol/L    Glucose 89 70 - 110 mg/dL    BUN, Bld 12 6 - 20 mg/dL    Creatinine 0.8 0.5 - 1.4 mg/dL    Calcium 10.0 8.7 - 10.5 mg/dL    Total Protein 7.6 6.0 - 8.4 g/dL    Albumin 4.5 3.5 - 5.2 g/dL    Total Bilirubin 0.4 0.1 - 1.0 mg/dL    Alkaline Phosphatase 57 55 - 135 U/L    AST 14 10 - 40 U/L    ALT 5 (L) 10 - 44 U/L    Anion Gap 12 8 - 16 mmol/L    eGFR if African American >60 >60 mL/min/1.73 m^2    eGFR if non African American >60 >60 mL/min/1.73 m^2   Urinalysis - Clean Catch   Result Value Ref Range    Specimen UA Urine, Clean Catch     Color, UA Yellow Yellow, Straw, [place]ola    Appearance, UA Clear Clear    pH, UA 8.0 5.0 - 8.0    Specific Gravity, UA 1.015 1.005 - 1.030    Protein, UA Negative Negative    Glucose, UA Negative Negative    Ketones, UA Negative Negative    Bilirubin (UA) Negative Negative    Occult Blood UA Negative Negative    Nitrite, UA Negative Negative    Urobilinogen, UA Negative <2.0 EU/dL    Leukocytes, UA Negative Negative   Drug screen panel, emergency   Result Value Ref Range    Benzodiazepines Presumptive Positive     Methadone metabolites Negative     Cocaine (Metab.) Negative     Opiate Scrn, Ur Negative     Barbiturate Screen, Ur Negative     Amphetamine Screen, Ur Presumptive Positive     THC Presumptive Positive     Phencyclidine Negative     Creatinine, Random Ur 138.3 15.0 - 325.0 mg/dL    Toxicology Information SEE COMMENT     Lipase   Result Value Ref Range    Lipase 22 4 - 60 U/L   Amylase   Result Value Ref Range    Amylase 65 20 - 110 U/L       Imaging Results:  Imaging Results          CT Abdomen Pelvis  Without Contrast (Final result)  Result time 11/08/19 18:38:52    Final result by Win Cordova MD (11/08/19 18:38:52)                 Impression:      No acute findings.    All CT scans at this facility are performed  using dose modulation techniques as appropriate to performed exam including the following:  automated exposure control; adjustment of mA and/or kV according to the patients size (this includes techniques or standardized protocols for targeted exams where dose is matched to indication/reason for exam: i.e. extremities or head);  iterative reconstruction technique.      Electronically signed by: Win Cordova MD  Date:    11/08/2019  Time:    18:38             Narrative:    EXAMINATION:  CT ABDOMEN PELVIS WITHOUT CONTRAST    CLINICAL HISTORY:  Abdominal pain, unspecified;    TECHNIQUE:  Axial images obtained of the abdomen and pelvis without IV contrast and without oral contrast.  Sagittal and coronal reformats obtained.    COMPARISON:  Abdomen and pelvis CT 09/11/2019    FINDINGS:  ABDOMEN:    - Lung bases: Small amount of probable nodular atelectasis or scarring lateral left lung base.  This appears stable.    - Liver: No contour deformities.    - Gallbladder: Cholecystectomy.    - Bile Ducts: No evidence of intra or extra hepatic biliary ductal dilation.    - Spleen: No contour deformities.    - Kidneys: No contour deformities.  Negative for hydronephrosis or calculi.    - Adrenals: Normal adrenals.    - Pancreas: No contour deformities.    - Bowel: The bowel is nonobstructed and without surrounding inflammatory changes.  Gastrostomy tube present.    - Retroperitoneum:  Unremarkable.    - Vascular: No abdominal aortic aneurysm.    - Abdominal wall:  There is a midline ventral hernia containing numerous loops of  bowel, as seen previously.    PELVIS:    - Bladder: Normal.    - : Uterus and adnexa unremarkable.    BONES:  No acute findings.  Stable degenerative and postoperative changes lower lumbar spine                                 The EKG was ordered, reviewed, and independently interpreted by the ED provider.  Interpretation time: 16:24  Rate: 73 BPM  Rhythm: normal sinus rhythm  Interpretation: No acute ST changes. No STEMI.         The Emergency Provider reviewed the vital signs and test results, which are outlined above.     ED Discussion     6:47 PM: Reassessed pt at this time.  Pt states her condition has improved at this time. Discussed with pt all pertinent ED information and results. Discussed pt dx and plan of tx. Gave pt all f/u and return to the ED instructions. All questions and concerns were addressed at this time. Pt expresses understanding of information and instructions, and is comfortable with plan to discharge. Pt is stable for discharge.    I discussed with patient and/or family/caretaker that evaluation in the ED does not suggest any emergent or life threatening medical conditions requiring immediate intervention beyond what was provided in the ED, and I believe patient is safe for discharge.  Regardless, an unremarkable evaluation in the ED does not preclude the development or presence of a serious of life threatening condition. As such, patient was instructed to return immediately for any worsening or change in current symptoms.     Medical Decision Making:   Clinical Tests:   Lab Tests: Ordered and Reviewed  Radiological Study: Ordered and Reviewed  Medical Tests: Ordered and Reviewed           ED Medication(s):  Medications   promethazine (PHENERGAN) 25 mg in dextrose 5 % 50 mL IVPB (0 mg Intravenous Stopped 11/8/19 1700)   ketorolac injection 30 mg (30 mg Intravenous Given 11/8/19 1640)   morphine injection 4 mg (4 mg Intravenous Given 11/8/19 1803)       Discharge Medication List as of  11/8/2019  6:43 PM          Follow-up Information     Akanksha West MD. Schedule an appointment as soon as possible for a visit in 2 days.    Specialty:  Internal Medicine  Contact information:  [address] [facility]  [address]  [facility] [address] [phone]                       Scribe Attestation:   Scribe #1: I performed the above scribed service and the documentation accurately describes the services I performed. I attest to the accuracy of the note.     Attending:   Physician Attestation Statement for Scribe #1: I, Cynthia May MD, personally performed the services described in this documentation, as scribed by Marti Delatorre/ Gaby Mei, in my presence, and it is both accurate and complete.           Clinical Impression       ICD-10-CM ICD-9-CM   1. Abdominal pain R10.9 789.00       Disposition:   Disposition: Discharged  Condition: Stable       Cynthia May MD  11/09/19 [id number]

## 2019-11-08 NOTE — TELEPHONE ENCOUNTER
Patient states she has had about 12 diarrhea stools in the past 24 hrs and has seen ruy red colored blood covering stool and she sees bright red blood when she wipes x 6 today. Denies seeing any clots. Her Cummins Valve System is inflated pretty tight. She says the air is not able to escape quickly enough. Abdominal pain 6/10, above belly button more to her left side. She says her normal pain with this issue is 4/10, pain started getting worse yesterday. She was referred to the  from Gastro but they have not been able to help her yet. Patient advised to go to ED now and voices understanding.     Reason for Disposition   SEVERE abdominal pain (e.g., excruciating) and present > 1 hour    Additional Information   Negative: Shock suspected (e.g., cold/pale/clammy skin, too weak to stand, low BP, rapid pulse)   Negative: Difficult to awaken or acting confused (e.g., disoriented, slurred speech)   Negative: Sounds like a life-threatening emergency to the triager   Negative: Vomiting also present and worse than the diarrhea   Negative: Blood in stool and without diarrhea    Protocols used: DIARRHEA-A-OH

## 2019-11-20 ENCOUNTER — TELEPHONE (OUTPATIENT)
Dept: GASTROENTEROLOGY | Facility: CLINIC | Age: 39
End: 2019-11-20

## 2019-11-20 NOTE — TELEPHONE ENCOUNTER
----- Message from Apurva Salinas MD sent at 11/19/2019  6:44 PM CST -----  Contact: [facility] [phone]  I tried calling this number few times and never got in touch with anyone. Pls call them and check on her status. Her PEG tube should not prevent her from getting access to care but this is not my decision to make.  The doctors that are deciding her disposition should make this call         ----- Message -----  From: Sharon Rebolledo RN  Sent: 11/14/2019   4:47 PM CST  To: Apurva Salinas MD        ----- Message -----  From: Camila Philip  Sent: 11/14/2019   2:29 PM CST  To: Brad BARON Staff    Pt is having psy. Problems.  Need to speak to Dr Salinsa on where she is at on this pt.  PEG Tube facilities are scared to take her because of the PEG.  Needs to know how serious that she has PEG tube.

## 2019-12-16 ENCOUNTER — HOSPITAL ENCOUNTER (EMERGENCY)
Facility: HOSPITAL | Age: 39
Discharge: PSYCHIATRIC OR SUBSTANCE USE FACILITY | End: 2019-12-17
Attending: EMERGENCY MEDICINE
Payer: COMMERCIAL

## 2019-12-16 DIAGNOSIS — F33.2 SEVERE EPISODE OF RECURRENT MAJOR DEPRESSIVE DISORDER, WITHOUT PSYCHOTIC FEATURES: Primary | ICD-10-CM

## 2019-12-16 DIAGNOSIS — R10.84 GENERALIZED ABDOMINAL PAIN: ICD-10-CM

## 2019-12-16 DIAGNOSIS — K31.84 GASTROPARESIS: ICD-10-CM

## 2019-12-16 DIAGNOSIS — R45.851 SUICIDE IDEATION: ICD-10-CM

## 2019-12-16 LAB
ALBUMIN SERPL BCP-MCNC: 4.1 G/DL (ref 3.5–5.2)
ALP SERPL-CCNC: 53 U/L (ref 55–135)
ALT SERPL W/O P-5'-P-CCNC: 8 U/L (ref 10–44)
AMPHET+METHAMPHET UR QL: NEGATIVE
ANION GAP SERPL CALC-SCNC: 12 MMOL/L (ref 8–16)
APAP SERPL-MCNC: <3 UG/ML (ref 10–20)
AST SERPL-CCNC: 13 U/L (ref 10–40)
BARBITURATES UR QL SCN>200 NG/ML: NEGATIVE
BASOPHILS # BLD AUTO: 0.03 K/UL (ref 0–0.2)
BASOPHILS NFR BLD: 0.3 % (ref 0–1.9)
BENZODIAZ UR QL SCN>200 NG/ML: NORMAL
BILIRUB SERPL-MCNC: 0.2 MG/DL (ref 0.1–1)
BILIRUB UR QL STRIP: NEGATIVE
BUN SERPL-MCNC: 13 MG/DL (ref 6–20)
BZE UR QL SCN: NEGATIVE
CALCIUM SERPL-MCNC: 9.7 MG/DL (ref 8.7–10.5)
CANNABINOIDS UR QL SCN: NEGATIVE
CHLORIDE SERPL-SCNC: 107 MMOL/L (ref 95–110)
CLARITY UR: CLEAR
CO2 SERPL-SCNC: 22 MMOL/L (ref 23–29)
COLOR UR: YELLOW
CREAT SERPL-MCNC: 0.8 MG/DL (ref 0.5–1.4)
CREAT UR-MCNC: 42.4 MG/DL (ref 15–325)
DIFFERENTIAL METHOD: ABNORMAL
EOSINOPHIL # BLD AUTO: 0.1 K/UL (ref 0–0.5)
EOSINOPHIL NFR BLD: 0.6 % (ref 0–8)
ERYTHROCYTE [DISTWIDTH] IN BLOOD BY AUTOMATED COUNT: 13.6 % (ref 11.5–14.5)
EST. GFR  (AFRICAN AMERICAN): >60 ML/MIN/1.73 M^2
EST. GFR  (NON AFRICAN AMERICAN): >60 ML/MIN/1.73 M^2
ETHANOL SERPL-MCNC: <10 MG/DL
GLUCOSE SERPL-MCNC: 81 MG/DL (ref 70–110)
GLUCOSE UR QL STRIP: NEGATIVE
HCT VFR BLD AUTO: 42.4 % (ref 37–48.5)
HGB BLD-MCNC: 13.5 G/DL (ref 12–16)
HGB UR QL STRIP: NEGATIVE
HIV 1+2 AB+HIV1 P24 AG SERPL QL IA: NEGATIVE
IMM GRANULOCYTES # BLD AUTO: 0.03 K/UL (ref 0–0.04)
IMM GRANULOCYTES NFR BLD AUTO: 0.3 % (ref 0–0.5)
KETONES UR QL STRIP: NEGATIVE
LEUKOCYTE ESTERASE UR QL STRIP: ABNORMAL
LIPASE SERPL-CCNC: 41 U/L (ref 4–60)
LYMPHOCYTES # BLD AUTO: 2.5 K/UL (ref 1–4.8)
LYMPHOCYTES NFR BLD: 26.9 % (ref 18–48)
MCH RBC QN AUTO: 27.6 PG (ref 27–31)
MCHC RBC AUTO-ENTMCNC: 31.8 G/DL (ref 32–36)
MCV RBC AUTO: 87 FL (ref 82–98)
METHADONE UR QL SCN>300 NG/ML: NEGATIVE
MICROSCOPIC COMMENT: NORMAL
MONOCYTES # BLD AUTO: 0.8 K/UL (ref 0.3–1)
MONOCYTES NFR BLD: 8.8 % (ref 4–15)
NEUTROPHILS # BLD AUTO: 5.8 K/UL (ref 1.8–7.7)
NEUTROPHILS NFR BLD: 63.1 % (ref 38–73)
NITRITE UR QL STRIP: NEGATIVE
NRBC BLD-RTO: 0 /100 WBC
OPIATES UR QL SCN: NEGATIVE
PCP UR QL SCN>25 NG/ML: NEGATIVE
PH UR STRIP: >8 [PH] (ref 5–8)
PLATELET # BLD AUTO: 217 K/UL (ref 150–350)
PMV BLD AUTO: 11.6 FL (ref 9.2–12.9)
POTASSIUM SERPL-SCNC: 3.9 MMOL/L (ref 3.5–5.1)
PROT SERPL-MCNC: 7.2 G/DL (ref 6–8.4)
PROT UR QL STRIP: NEGATIVE
RBC # BLD AUTO: 4.9 M/UL (ref 4–5.4)
SALICYLATES SERPL-MCNC: <5 MG/DL (ref 15–30)
SODIUM SERPL-SCNC: 141 MMOL/L (ref 136–145)
SP GR UR STRIP: 1.01 (ref 1–1.03)
T4 FREE SERPL-MCNC: 0.89 NG/DL (ref 0.71–1.51)
TOXICOLOGY INFORMATION: NORMAL
TSH SERPL DL<=0.005 MIU/L-ACNC: 0.29 UIU/ML (ref 0.4–4)
URN SPEC COLLECT METH UR: ABNORMAL
UROBILINOGEN UR STRIP-ACNC: NEGATIVE EU/DL
WBC # BLD AUTO: 9.27 K/UL (ref 3.9–12.7)
WBC #/AREA URNS HPF: 0 /HPF (ref 0–5)

## 2019-12-16 PROCEDURE — 80307 DRUG TEST PRSMV CHEM ANLYZR: CPT

## 2019-12-16 PROCEDURE — 80053 COMPREHEN METABOLIC PANEL: CPT

## 2019-12-16 PROCEDURE — 86703 HIV-1/HIV-2 1 RESULT ANTBDY: CPT

## 2019-12-16 PROCEDURE — 81000 URINALYSIS NONAUTO W/SCOPE: CPT | Mod: 59

## 2019-12-16 PROCEDURE — 96365 THER/PROPH/DIAG IV INF INIT: CPT

## 2019-12-16 PROCEDURE — 99285 EMERGENCY DEPT VISIT HI MDM: CPT | Mod: 25

## 2019-12-16 PROCEDURE — 36415 COLL VENOUS BLD VENIPUNCTURE: CPT

## 2019-12-16 PROCEDURE — 96375 TX/PRO/DX INJ NEW DRUG ADDON: CPT

## 2019-12-16 PROCEDURE — 96376 TX/PRO/DX INJ SAME DRUG ADON: CPT

## 2019-12-16 PROCEDURE — G0425 INPT/ED TELECONSULT30: HCPCS | Mod: GT,,, | Performed by: PSYCHIATRY & NEUROLOGY

## 2019-12-16 PROCEDURE — 80329 ANALGESICS NON-OPIOID 1 OR 2: CPT

## 2019-12-16 PROCEDURE — 63600175 PHARM REV CODE 636 W HCPCS: Performed by: EMERGENCY MEDICINE

## 2019-12-16 PROCEDURE — 84443 ASSAY THYROID STIM HORMONE: CPT

## 2019-12-16 PROCEDURE — 63600175 PHARM REV CODE 636 W HCPCS: Performed by: NURSE PRACTITIONER

## 2019-12-16 PROCEDURE — 85025 COMPLETE CBC W/AUTO DIFF WBC: CPT

## 2019-12-16 PROCEDURE — G0425 PR INPT TELEHEALTH CONSULT 30M: ICD-10-PCS | Mod: GT,,, | Performed by: PSYCHIATRY & NEUROLOGY

## 2019-12-16 PROCEDURE — 80320 DRUG SCREEN QUANTALCOHOLS: CPT

## 2019-12-16 PROCEDURE — 96361 HYDRATE IV INFUSION ADD-ON: CPT

## 2019-12-16 PROCEDURE — 84439 ASSAY OF FREE THYROXINE: CPT

## 2019-12-16 PROCEDURE — 83690 ASSAY OF LIPASE: CPT

## 2019-12-16 PROCEDURE — 96372 THER/PROPH/DIAG INJ SC/IM: CPT | Mod: 59

## 2019-12-16 RX ORDER — MORPHINE SULFATE 4 MG/ML
4 INJECTION, SOLUTION INTRAMUSCULAR; INTRAVENOUS
Status: COMPLETED | OUTPATIENT
Start: 2019-12-16 | End: 2019-12-16

## 2019-12-16 RX ORDER — ZIPRASIDONE MESYLATE 20 MG/ML
20 INJECTION, POWDER, LYOPHILIZED, FOR SOLUTION INTRAMUSCULAR
Status: COMPLETED | OUTPATIENT
Start: 2019-12-16 | End: 2019-12-16

## 2019-12-16 RX ADMIN — ZIPRASIDONE MESYLATE 20 MG: 20 INJECTION, POWDER, LYOPHILIZED, FOR SOLUTION INTRAMUSCULAR at 09:12

## 2019-12-16 RX ADMIN — SODIUM CHLORIDE 1000 ML: 0.9 INJECTION, SOLUTION INTRAVENOUS at 07:12

## 2019-12-16 RX ADMIN — LORAZEPAM 2 MG: 2 INJECTION INTRAMUSCULAR; INTRAVENOUS at 09:12

## 2019-12-16 RX ADMIN — LORAZEPAM 1 MG: 2 INJECTION INTRAMUSCULAR; INTRAVENOUS at 07:12

## 2019-12-16 RX ADMIN — PROMETHAZINE HYDROCHLORIDE 12.5 MG: 25 INJECTION INTRAMUSCULAR; INTRAVENOUS at 06:12

## 2019-12-16 RX ADMIN — MORPHINE SULFATE 4 MG: 4 INJECTION INTRAVENOUS at 07:12
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
## 2019-12-17 ENCOUNTER — TELEPHONE (OUTPATIENT)
Dept: GASTROENTEROLOGY | Facility: CLINIC | Age: 39
End: 2019-12-17

## 2019-12-17 VITALS
WEIGHT: 137.81 LBS | BODY MASS INDEX: 25.36 KG/M2 | SYSTOLIC BLOOD PRESSURE: 103 MMHG | TEMPERATURE: 98 F | OXYGEN SATURATION: 99 % | HEIGHT: 62 IN | DIASTOLIC BLOOD PRESSURE: 77 MMHG | HEART RATE: 87 BPM | RESPIRATION RATE: 18 BRPM

## 2019-12-17 RX ORDER — DIPHENHYDRAMINE HYDROCHLORIDE 50 MG/ML
50 INJECTION INTRAMUSCULAR; INTRAVENOUS EVERY 4 HOURS PRN
Status: DISCONTINUED | OUTPATIENT
Start: 2019-12-17 | End: 2019-12-17 | Stop reason: HOSPADM

## 2019-12-17 RX ORDER — HALOPERIDOL 5 MG/ML
5 INJECTION INTRAMUSCULAR EVERY 4 HOURS PRN
Status: DISCONTINUED | OUTPATIENT
Start: 2019-12-17 | End: 2019-12-17 | Stop reason: HOSPADM
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
## 2019-12-17 NOTE — ED NOTES
Report received from MEDARDO Christensen.    Pt resting in bed. No acute distress. RR equal and non-labored, VSS. Bed in low and locked position. Pt's room secured per protocol. Pt's belongings secured and pt placed in grey gown and yellow socks.  Pt being directly monitored by kevon Haney at this time.     Will continue to monitor

## 2019-12-17 NOTE — PROVIDER PROGRESS NOTES - EMERGENCY DEPT.
Encounter Date: 12/16/2019    ED Physician Progress Notes       SCRIBE NOTE: Roque BRIGGS, am scribing for, and in the presence of,  New Ahuja Jr., MD.  Physician Statement: INew Jr., MD, personally performed the services described in this documentation as scribed by Roque Olson in my presence, and it is both accurate and complete.          7:35 AM: Pt will be transferred via [facility].  Accepting Facility: [facility], [place]  Accepting Physician: Dr. Hooks

## 2019-12-17 NOTE — ED NOTES
Began sitting with pt at this time. Pt resting in bed with eyes closed, NAD noted, respirations e/u. Will continue to monitor.
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
## 2019-12-17 NOTE — TELEPHONE ENCOUNTER
----- Message from Gianna Orozco sent at 12/17/2019 12:59 PM CST -----  Contact: Kim ([facility]): [phone]  Calling to speak with nurse re the pt    Please contact Kim [facility]): [phone]

## 2019-12-17 NOTE — ED NOTES
Report from MEDARDO Morillo.    Pt is laying down in bed resting with eyes closed and easily arousable. Equal chest rise and fall noted, ptt is breathing with no difficulties and unlabored. Pt is in [facility] gown. Dilma Fernando, at BS doing 15 minute checks and in direct view of the patient.

## 2019-12-17 NOTE — ED NOTES
Kim with [facility] Emergency Department called to check on patient and update primary nurse on the firing of patient's psychiatrist and concern for safety.
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
## 2019-12-17 NOTE — ED NOTES
MD discussing care with patient after telepsych interview. MD concerned patient is a harm to herself. Patient became angry with MD and ripped her IV out. Patient refusing care after this. MD to PEC to prevent self harm. Patient is hostile to staff at this time.

## 2019-12-17 NOTE — ED NOTES
CPT called at this time-[facility] Stevenson accepting the patient to Dr. Hooks. [facility].

## 2019-12-17 NOTE — ED NOTES
"Pt refusing to change into gown. Pt stating, "I want to be DC right now. Call my  now. I want this sh** off of me.  MD and OC at bedside.   "

## 2019-12-17 NOTE — TELEPHONE ENCOUNTER
Spoke with  and expressed that I am not comfortable making any recomendations bc pt is no longer under my care.
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
## 2019-12-26 ENCOUNTER — PATIENT MESSAGE (OUTPATIENT)
Dept: GASTROENTEROLOGY | Facility: CLINIC | Age: 39
End: 2019-12-26

## 2019-12-30 ENCOUNTER — PATIENT MESSAGE (OUTPATIENT)
Dept: SURGERY | Facility: CLINIC | Age: 39
End: 2019-12-30

## 2020-01-06 ENCOUNTER — PATIENT MESSAGE (OUTPATIENT)
Dept: GASTROENTEROLOGY | Facility: CLINIC | Age: 40
End: 2020-01-06

## 2020-01-06 ENCOUNTER — TELEPHONE (OUTPATIENT)
Dept: GASTROENTEROLOGY | Facility: CLINIC | Age: 40
End: 2020-01-06

## 2020-01-06 NOTE — TELEPHONE ENCOUNTER
----- Message from Pop Schwab MD sent at 12/31/2019  9:44 AM CST -----  Any of the surgeons can see her    ----- Message -----  From: Keysha Mark LPN  Sent: 12/31/2019   8:52 AM CST  To: MD Dr Zaheer Tejada,   Dr Jeansonne is no longer seeing patients.   Do you have someone else you recommend for her to see?      ----- Message -----  From: Celeste Villanueva MA  Sent: 12/30/2019   2:15 PM CST  To: Keysha Mark LPN      ----- Message -----  From: Jacque Bonds LPN  Sent: 12/30/2019   2:11 PM CST  To: Maryan H Solorzano, MA Hello Dr Louis Jeansonne is no longer seeing patients  Thanks  Jacque  ----- Message -----  From: Celeste Villanueva MA  Sent: 12/30/2019  11:42 AM CST  To: Zee West LPN, Derek Wise MA, #    Sent to Dr. Julie Jeansonne in error.     Thank you,     Celeste   ----- Message -----  From: Keysha Mark LPN  Sent: 12/30/2019  11:36 AM CST  To: [facility] Julie Staff    Dr Schwab wants this pt to see Dr Jeansonne for hernia.   Does he see pts for hernias? He has seen her in the past, she said.    Can you help me with this?    Thanks  Keysha

## 2020-04-01 ENCOUNTER — PATIENT MESSAGE (OUTPATIENT)
Dept: CARDIOLOGY | Facility: CLINIC | Age: 40
End: 2020-04-01

## 2020-04-01 ENCOUNTER — TELEPHONE (OUTPATIENT)
Dept: CARDIOLOGY | Facility: CLINIC | Age: 40
End: 2020-04-01

## 2020-04-01 NOTE — TELEPHONE ENCOUNTER
Received email from pt about severe cp episodes overnight.    Please call pt and advise her to go to nearest ER for stat ecg, labs and see if there is any urgent workup that needs to be done or if it is noncardiac as she has a lot of noncardiac issues in past.    Dr Leon

## 2020-04-01 NOTE — TELEPHONE ENCOUNTER
Left message for pt and to  advise her to go to nearest ER for stat ecg, labs and see if there is any urgent workup that needs to be done or if it is noncardiac as she has a lot of noncardiac issues in past.

## 2020-05-28 ENCOUNTER — LAB REQUISITION (OUTPATIENT)
Dept: ADMINISTRATIVE | Age: 40
End: 2020-05-28
Payer: COMMERCIAL

## 2020-05-28 DIAGNOSIS — F39 MOOD DISORDER (HCC): ICD-10-CM

## 2020-05-28 PROCEDURE — 85025 COMPLETE CBC W/AUTO DIFF WBC: CPT | Performed by: OTHER

## 2020-05-28 PROCEDURE — 80164 ASSAY DIPROPYLACETIC ACD TOT: CPT | Performed by: OTHER

## 2020-05-28 PROCEDURE — 81001 URINALYSIS AUTO W/SCOPE: CPT | Performed by: OTHER

## 2020-05-28 PROCEDURE — 82150 ASSAY OF AMYLASE: CPT | Performed by: OTHER

## 2020-05-28 PROCEDURE — 83735 ASSAY OF MAGNESIUM: CPT | Performed by: OTHER

## 2020-05-28 PROCEDURE — 84100 ASSAY OF PHOSPHORUS: CPT | Performed by: OTHER

## 2020-05-28 PROCEDURE — 84443 ASSAY THYROID STIM HORMONE: CPT | Performed by: OTHER

## 2020-05-28 PROCEDURE — 81025 URINE PREGNANCY TEST: CPT | Performed by: OTHER

## 2020-05-28 PROCEDURE — 36415 COLL VENOUS BLD VENIPUNCTURE: CPT | Performed by: OTHER

## 2020-05-28 PROCEDURE — 80053 COMPREHEN METABOLIC PANEL: CPT | Performed by: OTHER

## 2020-05-30 ENCOUNTER — LAB REQUISITION (OUTPATIENT)
Dept: ADMINISTRATIVE | Age: 40
End: 2020-05-30
Payer: COMMERCIAL

## 2020-05-30 DIAGNOSIS — F50.9 EATING DISORDER: ICD-10-CM

## 2020-06-01 PROCEDURE — 80076 HEPATIC FUNCTION PANEL: CPT | Performed by: OTHER

## 2020-06-01 PROCEDURE — 80061 LIPID PANEL: CPT | Performed by: OTHER

## 2020-06-01 PROCEDURE — 36415 COLL VENOUS BLD VENIPUNCTURE: CPT | Performed by: OTHER

## 2020-06-01 PROCEDURE — 82306 VITAMIN D 25 HYDROXY: CPT | Performed by: NURSE PRACTITIONER

## 2020-06-04 ENCOUNTER — LAB REQUISITION (OUTPATIENT)
Dept: ADMINISTRATIVE | Age: 40
End: 2020-06-04
Payer: COMMERCIAL

## 2020-06-04 DIAGNOSIS — E03.9 THYROID ACTIVITY DECREASED: ICD-10-CM

## 2020-06-05 PROCEDURE — 84439 ASSAY OF FREE THYROXINE: CPT | Performed by: NURSE PRACTITIONER

## 2020-06-18 ENCOUNTER — LAB REQUISITION (OUTPATIENT)
Dept: ADMINISTRATIVE | Age: 40
End: 2020-06-18
Payer: COMMERCIAL

## 2020-06-18 DIAGNOSIS — Z79.899 MEDICATION MANAGEMENT: ICD-10-CM

## 2020-06-19 PROCEDURE — 80164 ASSAY DIPROPYLACETIC ACD TOT: CPT | Performed by: OTHER

## 2021-06-04 ENCOUNTER — HOSPITAL ENCOUNTER (EMERGENCY)
Facility: HOSPITAL | Age: 41
Discharge: HOME | End: 2021-06-04
Attending: EMERGENCY MEDICINE
Payer: COMMERCIAL

## 2021-06-04 VITALS
SYSTOLIC BLOOD PRESSURE: 139 MMHG | TEMPERATURE: 99 F | RESPIRATION RATE: 18 BRPM | DIASTOLIC BLOOD PRESSURE: 63 MMHG | OXYGEN SATURATION: 99 % | WEIGHT: 155.88 LBS | HEIGHT: 62 IN | HEART RATE: 76 BPM | BODY MASS INDEX: 28.69 KG/M2

## 2021-06-04 DIAGNOSIS — K31.84 GASTROPARESIS: Primary | ICD-10-CM

## 2021-06-04 DIAGNOSIS — R10.9 ABDOMINAL PAIN: ICD-10-CM

## 2021-06-04 LAB
ALBUMIN SERPL BCP-MCNC: 3.9 G/DL (ref 3.5–5.2)
ALP SERPL-CCNC: 46 U/L (ref 55–135)
ALT SERPL W/O P-5'-P-CCNC: 8 U/L (ref 10–44)
ANION GAP SERPL CALC-SCNC: 10 MMOL/L (ref 8–16)
AST SERPL-CCNC: 12 U/L (ref 10–40)
BASOPHILS # BLD AUTO: 0.04 K/UL (ref 0–0.2)
BASOPHILS NFR BLD: 0.5 % (ref 0–1.9)
BILIRUB SERPL-MCNC: 0.6 MG/DL (ref 0.1–1)
BUN SERPL-MCNC: 14 MG/DL (ref 6–20)
CALCIUM SERPL-MCNC: 9.1 MG/DL (ref 8.7–10.5)
CHLORIDE SERPL-SCNC: 103 MMOL/L (ref 95–110)
CO2 SERPL-SCNC: 27 MMOL/L (ref 23–29)
CREAT SERPL-MCNC: 0.8 MG/DL (ref 0.5–1.4)
DIFFERENTIAL METHOD: NORMAL
EOSINOPHIL # BLD AUTO: 0 K/UL (ref 0–0.5)
EOSINOPHIL NFR BLD: 0.4 % (ref 0–8)
ERYTHROCYTE [DISTWIDTH] IN BLOOD BY AUTOMATED COUNT: 11.5 % (ref 11.5–14.5)
EST. GFR  (AFRICAN AMERICAN): >60 ML/MIN/1.73 M^2
EST. GFR  (NON AFRICAN AMERICAN): >60 ML/MIN/1.73 M^2
GLUCOSE SERPL-MCNC: 76 MG/DL (ref 70–110)
HCT VFR BLD AUTO: 40.6 % (ref 37–48.5)
HCV AB SERPL QL IA: NEGATIVE
HEP C VIRUS HOLD SPECIMEN: NORMAL
HGB BLD-MCNC: 13.5 G/DL (ref 12–16)
HIV 1+2 AB+HIV1 P24 AG SERPL QL IA: NEGATIVE
IMM GRANULOCYTES # BLD AUTO: 0.03 K/UL (ref 0–0.04)
IMM GRANULOCYTES NFR BLD AUTO: 0.4 % (ref 0–0.5)
LIPASE SERPL-CCNC: 13 U/L (ref 4–60)
LYMPHOCYTES # BLD AUTO: 2 K/UL (ref 1–4.8)
LYMPHOCYTES NFR BLD: 25.8 % (ref 18–48)
MCH RBC QN AUTO: 30.7 PG (ref 27–31)
MCHC RBC AUTO-ENTMCNC: 33.3 G/DL (ref 32–36)
MCV RBC AUTO: 92 FL (ref 82–98)
MONOCYTES # BLD AUTO: 0.6 K/UL (ref 0.3–1)
MONOCYTES NFR BLD: 8.1 % (ref 4–15)
NEUTROPHILS # BLD AUTO: 5 K/UL (ref 1.8–7.7)
NEUTROPHILS NFR BLD: 64.8 % (ref 38–73)
NRBC BLD-RTO: 0 /100 WBC
PLATELET # BLD AUTO: 153 K/UL (ref 150–450)
PMV BLD AUTO: 10.9 FL (ref 9.2–12.9)
POTASSIUM SERPL-SCNC: 3.8 MMOL/L (ref 3.5–5.1)
PROT SERPL-MCNC: 6.8 G/DL (ref 6–8.4)
RBC # BLD AUTO: 4.4 M/UL (ref 4–5.4)
SODIUM SERPL-SCNC: 140 MMOL/L (ref 136–145)
WBC # BLD AUTO: 7.64 K/UL (ref 3.9–12.7)

## 2021-06-04 PROCEDURE — 36415 COLL VENOUS BLD VENIPUNCTURE: CPT | Performed by: EMERGENCY MEDICINE

## 2021-06-04 PROCEDURE — 86803 HEPATITIS C AB TEST: CPT | Performed by: EMERGENCY MEDICINE

## 2021-06-04 PROCEDURE — 80053 COMPREHEN METABOLIC PANEL: CPT | Performed by: STUDENT IN AN ORGANIZED HEALTH CARE EDUCATION/TRAINING PROGRAM

## 2021-06-04 PROCEDURE — 63600175 PHARM REV CODE 636 W HCPCS: Performed by: EMERGENCY MEDICINE

## 2021-06-04 PROCEDURE — 96361 HYDRATE IV INFUSION ADD-ON: CPT

## 2021-06-04 PROCEDURE — 83690 ASSAY OF LIPASE: CPT | Performed by: STUDENT IN AN ORGANIZED HEALTH CARE EDUCATION/TRAINING PROGRAM

## 2021-06-04 PROCEDURE — 85025 COMPLETE CBC W/AUTO DIFF WBC: CPT | Performed by: STUDENT IN AN ORGANIZED HEALTH CARE EDUCATION/TRAINING PROGRAM

## 2021-06-04 PROCEDURE — 96375 TX/PRO/DX INJ NEW DRUG ADDON: CPT

## 2021-06-04 PROCEDURE — 86703 HIV-1/HIV-2 1 RESULT ANTBDY: CPT | Performed by: EMERGENCY MEDICINE

## 2021-06-04 PROCEDURE — 25000003 PHARM REV CODE 250: Performed by: EMERGENCY MEDICINE

## 2021-06-04 PROCEDURE — 96374 THER/PROPH/DIAG INJ IV PUSH: CPT

## 2021-06-04 PROCEDURE — 99284 EMERGENCY DEPT VISIT MOD MDM: CPT | Mod: 25

## 2021-06-04 RX ORDER — ACETAMINOPHEN 500 MG
TABLET ORAL DAILY
COMMUNITY

## 2021-06-04 RX ORDER — PRAZOSIN HYDROCHLORIDE 2 MG/1
4 CAPSULE ORAL NIGHTLY
COMMUNITY
Start: 2020-12-23

## 2021-06-04 RX ORDER — MORPHINE SULFATE 4 MG/ML
4 INJECTION, SOLUTION INTRAMUSCULAR; INTRAVENOUS
Status: COMPLETED | OUTPATIENT
Start: 2021-06-04 | End: 2021-06-04

## 2021-06-04 RX ORDER — ONDANSETRON 2 MG/ML
4 INJECTION INTRAMUSCULAR; INTRAVENOUS
Status: COMPLETED | OUTPATIENT
Start: 2021-06-04 | End: 2021-06-04

## 2021-06-04 RX ORDER — HYDROMORPHONE HYDROCHLORIDE 1 MG/ML
1 INJECTION, SOLUTION INTRAMUSCULAR; INTRAVENOUS; SUBCUTANEOUS
Status: COMPLETED | OUTPATIENT
Start: 2021-06-04 | End: 2021-06-04

## 2021-06-04 RX ORDER — DESVENLAFAXINE 100 MG/1
100 TABLET, EXTENDED RELEASE ORAL NIGHTLY
COMMUNITY
Start: 2021-05-11

## 2021-06-04 RX ORDER — CHLORPROMAZINE HYDROCHLORIDE 50 MG/1
50 TABLET, FILM COATED ORAL 3 TIMES DAILY PRN
COMMUNITY
Start: 2020-12-02

## 2021-06-04 RX ORDER — TRAZODONE HYDROCHLORIDE 150 MG/1
TABLET ORAL
COMMUNITY

## 2021-06-04 RX ORDER — DIVALPROEX SODIUM 500 MG/1
1500 TABLET, DELAYED RELEASE ORAL DAILY
COMMUNITY

## 2021-06-04 RX ORDER — HYDROCODONE BITARTRATE AND ACETAMINOPHEN 10; 325 MG/1; MG/1
1 TABLET ORAL EVERY 6 HOURS PRN
Qty: 9 TABLET | Refills: 0 | OUTPATIENT
Start: 2021-06-04 | End: 2022-06-15

## 2021-06-04 RX ORDER — ONDANSETRON 4 MG/1
4 TABLET, FILM COATED ORAL EVERY 6 HOURS PRN
Qty: 12 TABLET | Refills: 0 | Status: SHIPPED | OUTPATIENT
Start: 2021-06-04 | End: 2021-10-13 | Stop reason: SDUPTHER

## 2021-06-04 RX ORDER — PANTOPRAZOLE SODIUM 40 MG/1
40 TABLET, DELAYED RELEASE ORAL 2 TIMES DAILY
COMMUNITY
Start: 2021-03-24

## 2021-06-04 RX ORDER — LEVOTHYROXINE SODIUM 25 UG/1
25 TABLET ORAL EVERY MORNING
COMMUNITY
Start: 2021-04-29

## 2021-06-04 RX ADMIN — ONDANSETRON 4 MG: 2 INJECTION INTRAMUSCULAR; INTRAVENOUS at 05:06

## 2021-06-04 RX ADMIN — MORPHINE SULFATE 4 MG: 4 INJECTION, SOLUTION INTRAMUSCULAR; INTRAVENOUS at 05:06

## 2021-06-04 RX ADMIN — HYDROMORPHONE HYDROCHLORIDE 1 MG: 1 INJECTION, SOLUTION INTRAMUSCULAR; INTRAVENOUS; SUBCUTANEOUS at 06:06

## 2021-06-04 RX ADMIN — SODIUM CHLORIDE 1000 ML: 0.9 INJECTION, SOLUTION INTRAVENOUS at 05:06

## 2021-06-10 NOTE — ED PROVIDER NOTES
SCRIBE #1 NOTE: I, Karmen Mendoza, am scribing for, and in the presence of, Vivian Coyle Do, MD. I have scribed the entire note.      History      Chief Complaint   Patient presents with    peg tube problem      pain surrounding peg tube and reddness noted.    hx peg tube 6 years, cont. tube feedings started 2 weeks ago.  saw GI yesterday and said they wanted to schedule a  scope, pt. states waiting to schedule, [place]        Review of patient's allergies indicates:   Allergen Reactions    Sulfa (sulfonamide antibiotics)         HPI   HPI    2/16/2019, 9:26 PM   History obtained from the patient      History of Present Illness: Lia Avila is a 39 y.o. female patient with a PMHx of gastroparesis who presents to the Emergency Department for abd pain surrounding PEG tube which onset gradually several months ago. Pt states sxs have worsened recently and is not able to feed through the tube due to the pain. Pt's PEG tube was placed 7 years ago, but has only been feeding through the tube for 3 weeks. Pt reports that the PEG tube is functioning well and is able to be flushed. Symptoms are constant and moderate in severity. No mitigating or exacerbating factors reported. No other sxs reported. Pt reports normal BM this week. Patient denies any CP, SOB, fever, chills, constipation, hematochezia, dysuria, hematuria, urinary frequency, n/v/d, extremity weakness/numbness, and all other sxs at this time. Prior Tx includes applying heat and cold, Norco 10, and Advil with no improvement. Pt is followed by Dr. Schwab (GI). No further complaints or concerns at this time.         Arrival mode: Personal vehicle    PCP: Akanksha West MD       Past Medical History:  Past Medical History:   Diagnosis Date    Acid reflux     Aerophagia     Alcoholic     recovering    Anxiety     Depression     Functional gastrointestinal disorder     IBS (irritable bowel syndrome)     Irritable bowel syndrome      Told mom when here with other child   Sleep apnea     Trivial aortic valve anomaly        Past Surgical History:  Past Surgical History:   Procedure Laterality Date    BACK SURGERY      fusion of L4, L5, and S1    CHEST TUBE PLACEMENT Left 1/16/2017    Performed by Louis O. Jeansonne IV, [facility] at [facility] OR    CHOLECYSTECTOMY      COLON SURGERY      COLONOSCOPY      Colonoscopy N/A 4/13/2018    Performed by Apurva Salinas MD at [facility] ENDO (4TH FLR)    ESOPHAGOGASTRODUODENOSCOPY (EGD) N/A 4/13/2018    Performed by Apurva Salinas MD at [facility] ENDO (4TH FLR)    ESOPHAGOGASTRODUODENOSCOPY (EGD) N/A 3/28/2017    Performed by Pop Schwab MD at [facility] ENDO    GASTROSTOMY-JEJEUNOSTOMY TUBE CHANGE/PLACEMENT      LAPAROSCOPY-DIAGNOSTIC N/A 1/3/2017    Performed by [facility]ne IV, [facility] at [facility] OR    LIPQH-PHYLDUKJ-GNGUZKKDIVQA N/A 1/3/2017    Performed by [facility] IV, [facility] at [facility] OR    MANOMETRY, ESOPHAGUS, WITH IMPEDANCE MEASUREMENT N/A 10/8/2018    Performed by Apurva Salinas MD at [facility] ENDO (4TH FLR)    MANOMETRY-ANORECTAL N/A 4/3/2018    Performed by Apurva Salinas MD at [facility] ENDO (4TH FLR)    RESECTION - SMALL BOWEL N/A 1/3/2017    Performed by [facility]ne IV, [facility] at [facility] OR    SMALL INTESTINE SURGERY      TRANSESOPHAGEAL ECHOCARDIOGRAM (KARMEN) N/A 4/27/2018    Performed by Ward Ricks MD at [facility] CATH LAB    UPPER GASTROINTESTINAL ENDOSCOPY           Family History:  Family History   Problem Relation Age of Onset    Hypertension Mother     Cancer Father 49        stomach CA    Stomach cancer Father 49    Liver cancer Father     Rectal cancer Maternal Grandmother         dx in 70s    Celiac disease Neg Hx     Cirrhosis Neg Hx     Colon cancer Neg Hx     Colon polyps Neg Hx     Crohn's disease Neg Hx     Cystic fibrosis Neg Hx     Esophageal cancer Neg Hx     Hemochromatosis Neg Hx     Inflammatory bowel disease Neg Hx     Irritable bowel syndrome Neg Hx     Liver disease Neg Hx     Ulcerative colitis Neg Hx      Dain's disease Neg Hx     Lymphoma Neg Hx     Tuberculosis Neg Hx     Scleroderma Neg Hx     Rheum arthritis Neg Hx     Multiple sclerosis Neg Hx     Melanoma Neg Hx     Lupus Neg Hx     Psoriasis Neg Hx     Skin cancer Neg Hx        Social History:  Social History     Tobacco Use    Smoking status: Smoker, Current Status Unknown     Packs/day: 0.25     Types: Cigarettes     Last attempt to quit: 12/3/2016     Years since quittin.2    Smokeless tobacco: Never Used   Substance and Sexual Activity    Alcohol use: No     Comment: pt states that she is a recoveirng alcoholic, last drink 11    Drug use: No    Sexual activity: Unknown       ROS   Review of Systems   Constitutional: Negative for chills and fever.   HENT: Negative for sore throat.    Respiratory: Negative for shortness of breath.    Cardiovascular: Negative for chest pain.   Gastrointestinal: Positive for abdominal pain. Negative for blood in stool, constipation, diarrhea, nausea and vomiting.   Genitourinary: Negative for dysuria, frequency and hematuria.   Musculoskeletal: Negative for back pain.   Skin: Negative for rash.   Neurological: Negative for weakness and numbness.   Hematological: Does not bruise/bleed easily.   All other systems reviewed and are negative.      Physical Exam      Initial Vitals [19]   BP Pulse Resp Temp SpO2   118/64 82 18 98.4 °F (36.9 °C) 98 %      MAP       --          Physical Exam  Nursing Notes and Vital Signs Reviewed.  Constitutional: Patient is in no acute distress. Well-developed and well-nourished.  Head: Atraumatic. Normocephalic.  Eyes: PERRL. EOM intact. Conjunctivae are not pale. No scleral icterus.  ENT: Mucous membranes are moist. Oropharynx is clear and symmetric.    Neck: Supple. Full ROM. No lymphadenopathy.  Cardiovascular: Regular rate. Regular rhythm. No murmurs, rubs, or gallops. Distal pulses are 2+ and symmetric.  Pulmonary/Chest: No respiratory distress. Clear to  "auscultation bilaterally. No wheezing or rales.  Abdominal: Soft and non-distended. Ventral hernia that is easily reducible. There is no tenderness.  No rebound, guarding, or rigidity. PEG tube is clean, dry, and intact.  Musculoskeletal: Moves all extremities. No obvious deformities. No edema. No calf tenderness.  Skin: Warm and dry.  Neurological:  Alert, awake, and appropriate.  Normal speech.  No acute focal neurological deficits are appreciated.  Psychiatric: Normal affect. Good eye contact. Appropriate in content.    ED Course    Procedures  ED Vital Signs:  Vitals:    02/16/19 2052 02/16/19 2201 02/16/19 2301 02/16/19 2302   BP: 118/64 (!) 101/51     Pulse: 82 79 73    Resp: 18      Temp: 98.4 °F (36.9 °C)      TempSrc: Oral      SpO2: 98% 99%  98%   Weight: 71 kg (156 lb 8.4 oz)      Height: 5' 2" (1.575 m)       02/16/19 2305   BP: 100/68   Pulse:    Resp:    Temp:    TempSrc:    SpO2:    Weight:    Height:        Abnormal Lab Results:  Labs Reviewed   CBC W/ AUTO DIFFERENTIAL - Abnormal; Notable for the following components:       Result Value    RDW 14.6 (*)     All other components within normal limits   COMPREHENSIVE METABOLIC PANEL - Abnormal; Notable for the following components:    Anion Gap 7 (*)     All other components within normal limits   URINALYSIS, REFLEX TO URINE CULTURE - Abnormal; Notable for the following components:    Ketones, UA 1+ (*)     All other components within normal limits    Narrative:     Preferred Collection Type->Urine, Clean Catch   LIPASE        All Lab Results:  Results for orders placed or performed during the hospital encounter of 02/16/19   CBC W/ AUTO DIFFERENTIAL   Result Value Ref Range    WBC 7.00 3.90 - 12.70 K/uL    RBC 4.66 4.00 - 5.40 M/uL    Hemoglobin 13.8 12.0 - 16.0 g/dL    Hematocrit 41.6 37.0 - 48.5 %    MCV 89 82 - 98 fL    MCH 29.6 27.0 - 31.0 pg    MCHC 33.2 32.0 - 36.0 g/dL    RDW 14.6 (H) 11.5 - 14.5 %    Platelets 212 150 - 350 K/uL    MPV 11.0 9.2 " - 12.9 fL    Gran # (ANC) 4.2 1.8 - 7.7 K/uL    Lymph # 2.0 1.0 - 4.8 K/uL    Mono # 0.6 0.3 - 1.0 K/uL    Eos # 0.1 0.0 - 0.5 K/uL    Baso # 0.02 0.00 - 0.20 K/uL    Gran% 60.4 38.0 - 73.0 %    Lymph% 28.7 18.0 - 48.0 %    Mono% 8.9 4.0 - 15.0 %    Eosinophil% 1.7 0.0 - 8.0 %    Basophil% 0.3 0.0 - 1.9 %    Differential Method Automated    Comp. Metabolic Panel   Result Value Ref Range    Sodium 141 136 - 145 mmol/L    Potassium 3.5 3.5 - 5.1 mmol/L    Chloride 107 95 - 110 mmol/L    CO2 27 23 - 29 mmol/L    Glucose 71 70 - 110 mg/dL    BUN, Bld 11 6 - 20 mg/dL    Creatinine 0.8 0.5 - 1.4 mg/dL    Calcium 9.3 8.7 - 10.5 mg/dL    Total Protein 6.6 6.0 - 8.4 g/dL    Albumin 3.9 3.5 - 5.2 g/dL    Total Bilirubin 0.3 0.1 - 1.0 mg/dL    Alkaline Phosphatase 65 55 - 135 U/L    AST 15 10 - 40 U/L    ALT 16 10 - 44 U/L    Anion Gap 7 (L) 8 - 16 mmol/L    eGFR if African American >60 >60 mL/min/1.73 m^2    eGFR if non African American >60 >60 mL/min/1.73 m^2   Lipase   Result Value Ref Range    Lipase 24 4 - 60 U/L   Urinalysis, Reflex to Urine Culture Urine, Clean Catch   Result Value Ref Range    Specimen UA Urine, Clean Catch     Color, UA Yellow Yellow, Straw, [place]    Appearance, UA Clear Clear    pH, UA 6.0 5.0 - 8.0    Specific Gravity, UA 1.025 1.005 - 1.030    Protein, UA Negative Negative    Glucose, UA Negative Negative    Ketones, UA 1+ (A) Negative    Bilirubin (UA) Negative Negative    Occult Blood UA Negative Negative    Nitrite, UA Negative Negative    Urobilinogen, UA 1.0 <2.0 EU/dL    Leukocytes, UA Negative Negative         Imaging Results:  Imaging Results    None                 The Emergency Provider reviewed the vital signs and test results, which are outlined above.    ED Discussion     11:23 PM: Reassessed pt at this time.  Pt is awake, alert, and in NAD at this time. Discussed with pt all pertinent ED information and results. Discussed pt dx and plan of tx. Gave pt all f/u and return to the ED  instructions. All questions and concerns were addressed at this time. Pt expresses understanding of information and instructions, and is comfortable with plan to discharge. Pt is stable for discharge.    I discussed with patient and/or family/caretaker that evaluation in the ED does not suggest any emergent or life threatening medical conditions requiring immediate intervention beyond what was provided in the ED, and I believe patient is safe for discharge.  Regardless, an unremarkable evaluation in the ED does not preclude the development or presence of a serious of life threatening condition. As such, patient was instructed to return immediately for any worsening or change in current symptoms.    Current Discharge Medication List      CONTINUE these medications which have NOT CHANGED    Details   atorvastatin (LIPITOR) 20 MG tablet 20 mg once daily.   Refills: 1      dextroamphetamine-amphetamine (ADDERALL) 20 mg tablet Take 1.5 tabs every morning, 1 tab 4-5 hours after that and another half tab later each day.      diazePAM (VALIUM) 10 MG Tab Take 10 mg by mouth as needed.      duloxetine (CYMBALTA) 60 MG capsule Take 120 mg by mouth once daily.       esomeprazole (NEXIUM) 40 MG capsule Take 1 capsule (40 mg total) by mouth 2 (two) times daily before meals.  Qty: 60 capsule, Refills: 6      HYDROcodone-acetaminophen (NORCO)  mg per tablet Take 1 tablet by mouth every 4 (four) hours as needed for Pain.  Qty: 12 tablet, Refills: 0      LINZESS 290 mcg Cap       metformin (GLUCOPHAGE) 500 MG tablet Take 500 mg by mouth 2 (two) times daily with meals.       methylnaltrexone (RELISTOR) 8 mg/0.4 mL Syrg Inject 8 mg into the skin every 48 hours.      metoclopramide HCl (REGLAN) 10 MG tablet Take 1 tablet (10 mg total) by mouth every 6 (six) hours.  Qty: 30 tablet, Refills: 0      ondansetron (ZOFRAN-ODT) 4 MG TbDL Take 2 tablets (8 mg total) by mouth every 8 (eight) hours as needed.  Qty: 60 tablet, Refills: 6       topiramate (TOPAMAX) 100 MG tablet Take 100 mg by mouth.      traZODone (DESYREL) 150 MG tablet Take 150 mg by mouth.      cholecalciferol, vitamin D3, (VITAMIN D3) 2,000 unit Cap Take 1 capsule by mouth.      ferrous gluconate (FERGON) 324 MG tablet Take 324 mg by mouth.      plecanatide (TRULANCE) 3 mg Tab Take 1 tablet (3 mg) by mouth once daily.  Qty: 30 tablet, Refills: 6         STOP taking these medications       prochlorperazine (COMPAZINE) 10 MG tablet Comments:   Reason for Stopping:         promethazine (PHENERGAN) 25 MG tablet Comments:   Reason for Stopping:                 ED Medication(s):  Medications   hydromorphone (PF) injection 1 mg (not administered)   sodium chloride 0.9% bolus 1,000 mL (1,000 mLs Intravenous New Bag 2/16/19 2145)   ondansetron injection 4 mg (4 mg Intravenous Given 2/16/19 2146)   hydromorphone (PF) injection 1 mg (1 mg Intravenous Given 2/16/19 2147)       Follow-up Information     Akanksha West MD.    Specialty:  Internal Medicine  Contact information:  [address]  [address]  [facility] [id number] [phone]             Pop Schwab MD In 2 days.    Specialty:  Gastroenterology  Contact information:  [address]  [place] [id number] [phone]                     Medical Decision Making    Medical Decision Making:   Clinical Tests:   Lab Tests: Ordered and Reviewed           Scribe Attestation:   Scribe #1: I performed the above scribed service and the documentation accurately describes the services I performed. I attest to the accuracy of the note.    Attending:   Physician Attestation Statement for Scribe #1: I, Vivian Coyle Do, MD, personally performed the services described in this documentation, as scribed by Karmen Mendoza, in my presence, and it is both accurate and complete.          Clinical Impression       ICD-10-CM ICD-9-CM   1. Gastroparesis K31.84 536.3       Disposition:   Disposition: Discharged  Condition: Stable          Vivian Coyle Do, MD  02/16/19 4925     done

## 2021-10-13 ENCOUNTER — OFFICE VISIT (OUTPATIENT)
Dept: GASTROENTEROLOGY | Facility: CLINIC | Age: 41
End: 2021-10-13
Payer: COMMERCIAL

## 2021-10-13 VITALS
HEIGHT: 62 IN | WEIGHT: 148.13 LBS | BODY MASS INDEX: 27.26 KG/M2 | SYSTOLIC BLOOD PRESSURE: 122 MMHG | DIASTOLIC BLOOD PRESSURE: 70 MMHG

## 2021-10-13 DIAGNOSIS — R10.84 GENERALIZED ABDOMINAL PAIN: ICD-10-CM

## 2021-10-13 DIAGNOSIS — M62.89 PELVIC FLOOR DYSFUNCTION: Primary | ICD-10-CM

## 2021-10-13 DIAGNOSIS — K59.09 CHRONIC CONSTIPATION: ICD-10-CM

## 2021-10-13 DIAGNOSIS — K58.1 IRRITABLE BOWEL SYNDROME WITH CONSTIPATION: Chronic | ICD-10-CM

## 2021-10-13 PROCEDURE — 99999 PR PBB SHADOW E&M-EST. PATIENT-LVL IV: CPT | Mod: PBBFAC,,, | Performed by: INTERNAL MEDICINE

## 2021-10-13 PROCEDURE — 1159F PR MEDICATION LIST DOCUMENTED IN MEDICAL RECORD: ICD-10-PCS | Mod: CPTII,S$GLB,, | Performed by: INTERNAL MEDICINE

## 2021-10-13 PROCEDURE — 99214 PR OFFICE/OUTPT VISIT, EST, LEVL IV, 30-39 MIN: ICD-10-PCS | Mod: S$GLB,,, | Performed by: INTERNAL MEDICINE

## 2021-10-13 PROCEDURE — 1159F MED LIST DOCD IN RCRD: CPT | Mod: CPTII,S$GLB,, | Performed by: INTERNAL MEDICINE

## 2021-10-13 PROCEDURE — 3074F PR MOST RECENT SYSTOLIC BLOOD PRESSURE < 130 MM HG: ICD-10-PCS | Mod: CPTII,S$GLB,, | Performed by: INTERNAL MEDICINE

## 2021-10-13 PROCEDURE — 3008F PR BODY MASS INDEX (BMI) DOCUMENTED: ICD-10-PCS | Mod: CPTII,S$GLB,, | Performed by: INTERNAL MEDICINE

## 2021-10-13 PROCEDURE — 3078F DIAST BP <80 MM HG: CPT | Mod: CPTII,S$GLB,, | Performed by: INTERNAL MEDICINE

## 2021-10-13 PROCEDURE — 3074F SYST BP LT 130 MM HG: CPT | Mod: CPTII,S$GLB,, | Performed by: INTERNAL MEDICINE

## 2021-10-13 PROCEDURE — 99999 PR PBB SHADOW E&M-EST. PATIENT-LVL IV: ICD-10-PCS | Mod: PBBFAC,,, | Performed by: INTERNAL MEDICINE

## 2021-10-13 PROCEDURE — 3078F PR MOST RECENT DIASTOLIC BLOOD PRESSURE < 80 MM HG: ICD-10-PCS | Mod: CPTII,S$GLB,, | Performed by: INTERNAL MEDICINE

## 2021-10-13 PROCEDURE — 99214 OFFICE O/P EST MOD 30 MIN: CPT | Mod: S$GLB,,, | Performed by: INTERNAL MEDICINE

## 2021-10-13 PROCEDURE — 3008F BODY MASS INDEX DOCD: CPT | Mod: CPTII,S$GLB,, | Performed by: INTERNAL MEDICINE

## 2021-10-13 RX ORDER — ONDANSETRON 4 MG/1
4 TABLET, FILM COATED ORAL EVERY 6 HOURS PRN
Qty: 42 TABLET | Refills: 2 | Status: SHIPPED | OUTPATIENT
Start: 2021-10-13 | End: 2021-11-12

## 2022-01-05 ENCOUNTER — HOSPITAL ENCOUNTER (EMERGENCY)
Facility: HOSPITAL | Age: 42
Discharge: HOME | End: 2022-01-05
Attending: EMERGENCY MEDICINE
Payer: COMMERCIAL

## 2022-01-05 VITALS
BODY MASS INDEX: 27.52 KG/M2 | HEIGHT: 62 IN | OXYGEN SATURATION: 100 % | WEIGHT: 149.56 LBS | RESPIRATION RATE: 18 BRPM | SYSTOLIC BLOOD PRESSURE: 119 MMHG | TEMPERATURE: 98 F | DIASTOLIC BLOOD PRESSURE: 66 MMHG | HEART RATE: 73 BPM

## 2022-01-05 DIAGNOSIS — R10.9 ABDOMINAL PAIN: ICD-10-CM

## 2022-01-05 DIAGNOSIS — F12.90 CANNABINOID HYPEREMESIS SYNDROME: Primary | ICD-10-CM

## 2022-01-05 DIAGNOSIS — R11.2 CANNABINOID HYPEREMESIS SYNDROME: Primary | ICD-10-CM

## 2022-01-05 DIAGNOSIS — R10.84 GENERALIZED ABDOMINAL PAIN: ICD-10-CM

## 2022-01-05 LAB
ALBUMIN SERPL BCP-MCNC: 4 G/DL (ref 3.5–5.2)
ALP SERPL-CCNC: 39 U/L (ref 55–135)
ALT SERPL W/O P-5'-P-CCNC: 9 U/L (ref 10–44)
AMPHET+METHAMPHET UR QL: ABNORMAL
ANION GAP SERPL CALC-SCNC: 8 MMOL/L (ref 8–16)
AST SERPL-CCNC: 12 U/L (ref 10–40)
B-HCG UR QL: NEGATIVE
BARBITURATES UR QL SCN>200 NG/ML: NEGATIVE
BASOPHILS # BLD AUTO: 0.04 K/UL (ref 0–0.2)
BASOPHILS NFR BLD: 0.6 % (ref 0–1.9)
BENZODIAZ UR QL SCN>200 NG/ML: NEGATIVE
BILIRUB SERPL-MCNC: 0.4 MG/DL (ref 0.1–1)
BILIRUB UR QL STRIP: NEGATIVE
BUN SERPL-MCNC: 9 MG/DL (ref 6–20)
BZE UR QL SCN: NEGATIVE
CALCIUM SERPL-MCNC: 9 MG/DL (ref 8.7–10.5)
CANNABINOIDS UR QL SCN: ABNORMAL
CHLORIDE SERPL-SCNC: 106 MMOL/L (ref 95–110)
CLARITY UR: CLEAR
CO2 SERPL-SCNC: 26 MMOL/L (ref 23–29)
COLOR UR: YELLOW
CREAT SERPL-MCNC: 0.8 MG/DL (ref 0.5–1.4)
CREAT UR-MCNC: 98.9 MG/DL (ref 15–325)
DIFFERENTIAL METHOD: NORMAL
EOSINOPHIL # BLD AUTO: 0.1 K/UL (ref 0–0.5)
EOSINOPHIL NFR BLD: 0.7 % (ref 0–8)
ERYTHROCYTE [DISTWIDTH] IN BLOOD BY AUTOMATED COUNT: 12.5 % (ref 11.5–14.5)
EST. GFR  (AFRICAN AMERICAN): >60 ML/MIN/1.73 M^2
EST. GFR  (NON AFRICAN AMERICAN): >60 ML/MIN/1.73 M^2
GLUCOSE SERPL-MCNC: 84 MG/DL (ref 70–110)
GLUCOSE UR QL STRIP: NEGATIVE
HCT VFR BLD AUTO: 38.9 % (ref 37–48.5)
HGB BLD-MCNC: 12.7 G/DL (ref 12–16)
HGB UR QL STRIP: NEGATIVE
IMM GRANULOCYTES # BLD AUTO: 0.02 K/UL (ref 0–0.04)
IMM GRANULOCYTES NFR BLD AUTO: 0.3 % (ref 0–0.5)
KETONES UR QL STRIP: NEGATIVE
LEUKOCYTE ESTERASE UR QL STRIP: NEGATIVE
LIPASE SERPL-CCNC: 26 U/L (ref 4–60)
LYMPHOCYTES # BLD AUTO: 2.7 K/UL (ref 1–4.8)
LYMPHOCYTES NFR BLD: 39.5 % (ref 18–48)
MCH RBC QN AUTO: 29.4 PG (ref 27–31)
MCHC RBC AUTO-ENTMCNC: 32.6 G/DL (ref 32–36)
MCV RBC AUTO: 90 FL (ref 82–98)
METHADONE UR QL SCN>300 NG/ML: NEGATIVE
MONOCYTES # BLD AUTO: 0.6 K/UL (ref 0.3–1)
MONOCYTES NFR BLD: 9.2 % (ref 4–15)
NEUTROPHILS # BLD AUTO: 3.4 K/UL (ref 1.8–7.7)
NEUTROPHILS NFR BLD: 49.7 % (ref 38–73)
NITRITE UR QL STRIP: NEGATIVE
NRBC BLD-RTO: 0 /100 WBC
OPIATES UR QL SCN: NEGATIVE
PCP UR QL SCN>25 NG/ML: NEGATIVE
PH UR STRIP: 7 [PH] (ref 5–8)
PLATELET # BLD AUTO: 159 K/UL (ref 150–450)
PMV BLD AUTO: 11.3 FL (ref 9.2–12.9)
POTASSIUM SERPL-SCNC: 4.1 MMOL/L (ref 3.5–5.1)
PROT SERPL-MCNC: 6.7 G/DL (ref 6–8.4)
PROT UR QL STRIP: NEGATIVE
RBC # BLD AUTO: 4.32 M/UL (ref 4–5.4)
SODIUM SERPL-SCNC: 140 MMOL/L (ref 136–145)
SP GR UR STRIP: 1.01 (ref 1–1.03)
TOXICOLOGY INFORMATION: ABNORMAL
URN SPEC COLLECT METH UR: NORMAL
UROBILINOGEN UR STRIP-ACNC: NEGATIVE EU/DL
WBC # BLD AUTO: 6.83 K/UL (ref 3.9–12.7)

## 2022-01-05 PROCEDURE — 81003 URINALYSIS AUTO W/O SCOPE: CPT | Performed by: NURSE PRACTITIONER

## 2022-01-05 PROCEDURE — 99285 EMERGENCY DEPT VISIT HI MDM: CPT | Mod: 25

## 2022-01-05 PROCEDURE — 80053 COMPREHEN METABOLIC PANEL: CPT | Performed by: NURSE PRACTITIONER

## 2022-01-05 PROCEDURE — 63600175 PHARM REV CODE 636 W HCPCS: Performed by: EMERGENCY MEDICINE

## 2022-01-05 PROCEDURE — 81025 URINE PREGNANCY TEST: CPT | Performed by: NURSE PRACTITIONER

## 2022-01-05 PROCEDURE — 80307 DRUG TEST PRSMV CHEM ANLYZR: CPT | Performed by: NURSE PRACTITIONER

## 2022-01-05 PROCEDURE — 83690 ASSAY OF LIPASE: CPT | Performed by: NURSE PRACTITIONER

## 2022-01-05 PROCEDURE — 85025 COMPLETE CBC W/AUTO DIFF WBC: CPT | Performed by: NURSE PRACTITIONER

## 2022-01-05 PROCEDURE — 96375 TX/PRO/DX INJ NEW DRUG ADDON: CPT

## 2022-01-05 PROCEDURE — 96374 THER/PROPH/DIAG INJ IV PUSH: CPT

## 2022-01-05 RX ORDER — MORPHINE SULFATE 4 MG/ML
4 INJECTION, SOLUTION INTRAMUSCULAR; INTRAVENOUS
Status: COMPLETED | OUTPATIENT
Start: 2022-01-05 | End: 2022-01-05

## 2022-01-05 RX ORDER — PROMETHAZINE HYDROCHLORIDE 25 MG/1
25 SUPPOSITORY RECTAL EVERY 6 HOURS PRN
Qty: 10 SUPPOSITORY | Refills: 0 | Status: SHIPPED | OUTPATIENT
Start: 2022-01-05

## 2022-01-05 RX ORDER — ONDANSETRON 2 MG/ML
4 INJECTION INTRAMUSCULAR; INTRAVENOUS
Status: COMPLETED | OUTPATIENT
Start: 2022-01-05 | End: 2022-01-05

## 2022-01-05 RX ADMIN — MORPHINE SULFATE 4 MG: 4 INJECTION INTRAVENOUS at 06:01

## 2022-01-05 RX ADMIN — ONDANSETRON 4 MG: 2 INJECTION INTRAMUSCULAR; INTRAVENOUS at 06:01

## 2022-01-05 NOTE — ED PROVIDER NOTES
SCRIBE #1 NOTE: IDominic, am scribing for, and in the presence of, New Ahuja Jr., MD. I have scribed the HPI, ROS, and PEx.    SCRIBE #2 NOTE: I, Amparo Falcon, am scribing for, and in the presence of,  James Myrick MD. I have scribed the remaining portions of the note not scribed by Scribe #1.      History     Chief Complaint   Patient presents with    Abdominal Pain     Pt has GI history with gtube. Gtube removed a year ago. Pt has been reporting abdominal pain and emesis and constipation. Reports only 5 BM in 3 weeks.     Review of patient's allergies indicates:   Allergen Reactions    Sulfa (sulfonamide antibiotics) Rash    Reglan [metoclopramide] Other (See Comments)     Tardive dyskinesia         History of Present Illness     HPI    1/5/2022, 5:26 PM  History obtained from the patient      History of Present Illness: Lia Avila is a 41 y.o. female patient with a PMHx of acid reflux, functional gastrointestinal disorder, and IBS, who presents to the Emergency Department for evaluation of abdominal pain which onset gradually 3 weeks ago. Pt reports that her G tube was removed 1 year ago. She now is experiencing abdominal pain, N/V, and constipation. States that she has only had 5 BM over the last 3 weeks. Symptoms are constant and moderate in severity. No mitigating or exacerbating factors reported. Patient denies any fever, diarrhea, dysuria, dizziness, weakness, numbness, and all other sxs at this time. No prior Tx reported. No further complaints or concerns at this time.       Arrival mode: Personal vehicle    PCP: Akanksha West MD        Past Medical History:  Past Medical History:   Diagnosis Date    Acid reflux     Aerophagia     Alcoholic     recovering    Anxiety     Depression     Functional gastrointestinal disorder     Hyperlipidemia     IBS (irritable bowel syndrome)     Irritable bowel syndrome     Sleep apnea     Trivial aortic valve anomaly        Past Surgical  History:  Past Surgical History:   Procedure Laterality Date    BACK SURGERY      fusion of L4, L5, and S1    CHOLECYSTECTOMY      COLON SURGERY      COLONOSCOPY      COLONOSCOPY N/A 2018    Procedure: Colonoscopy;  Surgeon: Apurva Salinas MD;  [facility]  Service: Endoscopy;  Laterality: N/A;  5 days of full liquids then 24 hours clear liquids    ESOPHAGEAL MANOMETRY WITH MEASUREMENT OF IMPEDANCE N/A 10/8/2018    Procedure: MANOMETRY, ESOPHAGUS, WITH IMPEDANCE MEASUREMENT;  Surgeon: Apurva Salinas MD;  Location: [facility] [employer] ([address] [place] [address]);  Service: Endoscopy;  Laterality: N/A;    GASTROSTOMY-JEJEUNOSTOMY TUBE CHANGE/PLACEMENT      SMALL INTESTINE SURGERY      UPPER GASTROINTESTINAL ENDOSCOPY           Family History:  Family History   Problem Relation Age of Onset    Hypertension Mother     Cancer Father 49        stomach CA    Stomach cancer Father 49    Liver cancer Father     Rectal cancer Maternal Grandmother         dx in 70s    Celiac disease Neg Hx     Cirrhosis Neg Hx     Colon cancer Neg Hx     Colon polyps Neg Hx     Crohn's disease Neg Hx     Cystic fibrosis Neg Hx     Esophageal cancer Neg Hx     Hemochromatosis Neg Hx     Inflammatory bowel disease Neg Hx     Irritable bowel syndrome Neg Hx     Liver disease Neg Hx     Ulcerative colitis Neg Hx     Dain's disease Neg Hx     Lymphoma Neg Hx     Tuberculosis Neg Hx     Scleroderma Neg Hx     Rheum arthritis Neg Hx     Multiple sclerosis Neg Hx     Melanoma Neg Hx     Lupus Neg Hx     Psoriasis Neg Hx     Skin cancer Neg Hx        Social History:  Social History     Tobacco Use    Smoking status: Smoker, Current Status Unknown     Packs/day: 0.25     Types: Cigarettes     Last attempt to quit: 12/3/2016     Years since quittin.0    Smokeless tobacco: Never Used   Substance and Sexual Activity    Alcohol use: No     Comment: pt states that she is a recoveirng alcoholic, last drink 11     Drug use: Not Currently     Types: Marijuana    Sexual activity: Not on file        Review of Systems     Review of Systems   Constitutional: Negative for fever.   HENT: Negative for congestion and sore throat.    Respiratory: Negative for cough and shortness of breath.    Cardiovascular: Negative for chest pain.   Gastrointestinal: Positive for abdominal pain, constipation, nausea and vomiting. Negative for diarrhea.   Genitourinary: Negative for dysuria.   Musculoskeletal: Negative for back pain.   Skin: Negative for rash.   Neurological: Negative for dizziness, weakness and numbness.   Hematological: Does not bruise/bleed easily.   All other systems reviewed and are negative.     Physical Exam     Initial Vitals [01/05/22 1410]   BP Pulse Resp Temp SpO2   (!) 131/56 84 18 98.3 °F (36.8 °C) 97 %      MAP       --          Physical Exam  Nursing Notes and Vital Signs Reviewed.  Constitutional: Patient is in no acute distress. Well-developed and well-nourished.  Head: Atraumatic. Normocephalic.  Eyes: PERRL. EOM intact. Conjunctivae are not pale. No scleral icterus.  ENT: Mucous membranes are moist. Oropharynx is clear and symmetric.    Neck: Supple. Full ROM. No lymphadenopathy.  Cardiovascular: Regular rate. Regular rhythm. No murmurs, rubs, or gallops. Distal pulses are 2+ and symmetric.  Pulmonary/Chest: No respiratory distress. Clear to auscultation bilaterally. No wheezing or rales.  Abdominal: Soft and non-distended.  There is no tenderness.  No rebound, guarding, or rigidity. Good bowel sounds.  Genitourinary: No CVA tenderness  Musculoskeletal: Moves all extremities. No obvious deformities. No edema. No calf tenderness.  Skin: Warm and dry.  Neurological:  Alert, awake, and appropriate.  Normal speech.  No acute focal neurological deficits are appreciated.  Psychiatric: Delusional. Paranoid. Pressured speech.     ED Course   Procedures  ED Vital Signs:  Vitals:    01/05/22 1410 01/05/22 1832 01/05/22  "1834 01/05/22 1835   BP: (!) 131/56  119/66    Pulse: 84   73   Resp: 18 18     Temp: 98.3 °F (36.8 °C)      TempSrc: Oral      SpO2: 97%   100%   Weight: 67.8 kg (149 lb 9.3 oz)      Height: 5' 2" (1.575 m)          Abnormal Lab Results:  Labs Reviewed   COMPREHENSIVE METABOLIC PANEL - Abnormal; Notable for the following components:       Result Value    Alkaline Phosphatase 39 (*)     ALT 9 (*)     All other components within normal limits   DRUG SCREEN PANEL, URINE EMERGENCY - Abnormal; Notable for the following components:    Amphetamine Screen, Ur Presumptive Positive (*)     THC Presumptive Positive (*)     All other components within normal limits    Narrative:     Specimen Source->Urine   CBC W/ AUTO DIFFERENTIAL   LIPASE   URINALYSIS, REFLEX TO URINE CULTURE    Narrative:     Specimen Source->Urine   PREGNANCY TEST, URINE RAPID   PREGNANCY TEST, URINE RAPID    Narrative:     Specimen Source->Urine   DRUG SCREEN PANEL, URINE EMERGENCY        All Lab Results:  Results for orders placed or performed during the hospital encounter of 01/05/22   CBC auto differential   Result Value Ref Range    WBC 6.83 3.90 - 12.70 K/uL    RBC 4.32 4.00 - 5.40 M/uL    Hemoglobin 12.7 12.0 - 16.0 g/dL    Hematocrit 38.9 37.0 - 48.5 %    MCV 90 82 - 98 fL    MCH 29.4 27.0 - 31.0 pg    MCHC 32.6 32.0 - 36.0 g/dL    RDW 12.5 11.5 - 14.5 %    Platelets 159 150 - 450 K/uL    MPV 11.3 9.2 - 12.9 fL    Immature Granulocytes 0.3 0.0 - 0.5 %    Gran # (ANC) 3.4 1.8 - 7.7 K/uL    Immature Grans (Abs) 0.02 0.00 - 0.04 K/uL    Lymph # 2.7 1.0 - 4.8 K/uL    Mono # 0.6 0.3 - 1.0 K/uL    Eos # 0.1 0.0 - 0.5 K/uL    Baso # 0.04 0.00 - 0.20 K/uL    nRBC 0 0 /100 WBC    Gran % 49.7 38.0 - 73.0 %    Lymph % 39.5 18.0 - 48.0 %    Mono % 9.2 4.0 - 15.0 %    Eosinophil % 0.7 0.0 - 8.0 %    Basophil % 0.6 0.0 - 1.9 %    Differential Method Automated    Comprehensive metabolic panel   Result Value Ref Range    Sodium 140 136 - 145 mmol/L    Potassium " 4.1 3.5 - 5.1 mmol/L    Chloride 106 95 - 110 mmol/L    CO2 26 23 - 29 mmol/L    Glucose 84 70 - 110 mg/dL    BUN 9 6 - 20 mg/dL    Creatinine 0.8 0.5 - 1.4 mg/dL    Calcium 9.0 8.7 - 10.5 mg/dL    Total Protein 6.7 6.0 - 8.4 g/dL    Albumin 4.0 3.5 - 5.2 g/dL    Total Bilirubin 0.4 0.1 - 1.0 mg/dL    Alkaline Phosphatase 39 (L) 55 - 135 U/L    AST 12 10 - 40 U/L    ALT 9 (L) 10 - 44 U/L    Anion Gap 8 8 - 16 mmol/L    eGFR if African American >60 >60 mL/min/1.73 m^2    eGFR if non African American >60 >60 mL/min/1.73 m^2   Lipase   Result Value Ref Range    Lipase 26 4 - 60 U/L   Urinalysis, Reflex to Urine Culture Urine, Clean Catch    Specimen: Urine   Result Value Ref Range    Specimen UA Urine, Clean Catch     Color, UA Yellow Yellow, Straw, [place]ola    Appearance, UA Clear Clear    pH, UA 7.0 5.0 - 8.0    Specific Gravity, UA 1.010 1.005 - 1.030    Protein, UA Negative Negative    Glucose, UA Negative Negative    Ketones, UA Negative Negative    Bilirubin (UA) Negative Negative    Occult Blood UA Negative Negative    Nitrite, UA Negative Negative    Urobilinogen, UA Negative <2.0 EU/dL    Leukocytes, UA Negative Negative   Pregnancy, urine rapid   Result Value Ref Range    Preg Test, Ur Negative    Drug screen panel, in-house   Result Value Ref Range    Benzodiazepines Negative Negative    Methadone metabolites Negative Negative    Cocaine (Metab.) Negative Negative    Opiate Scrn, Ur Negative Negative    Barbiturate Screen, Ur Negative Negative    Amphetamine Screen, Ur Presumptive Positive (A) Negative    THC Presumptive Positive (A) Negative    Phencyclidine Negative Negative    Creatinine, Urine 98.9 15.0 - 325.0 mg/dL    Toxicology Information SEE COMMENT      Imaging Results:  Imaging Results          X-Ray Abdomen Flat And Erect (Final result)  Result time 01/05/22 19:57:57    Final result by Cisco Marrufo MD (01/05/22 19:57:57)                 Impression:      No evidence for bowel  obstruction      Electronically signed by: Yaron Peck  Date:    01/05/2022  Time:    19:57             Narrative:    EXAMINATION:  XR ABDOMEN FLAT AND ERECT    CLINICAL HISTORY:  Unspecified abdominal pain    TECHNIQUE:  Flat and erect AP views of the abdomen were performed.    COMPARISON:  None    FINDINGS:  Right upper quadrant surgical clip.  Lumbar post surgical changes.  No air fluid levels or distended loops of bowel.                               CT Abdomen Pelvis  Without Contrast (Final result)  Result time 01/05/22 19:15:52    Final result by Cisco Marrufo MD (01/05/22 19:15:52)                 Impression:      Mild colonic loop containing supraumbilical hernia.  No bowel obstruction.  Status post cholecystectomy    Status post cholecystectomy    8 mm nodule left lung base.    Lumbosacral postoperative changes    A small tract is seen above the stomach in the anterior subcutaneous fat may relate to prior gastrostomy tube placement.    All CT scans   are performed using dose optimization techniques including the following: automated exposure control; adjustment of the mA and/or kV; use of iterative reconstruction technique.  Dose modulation was employed for ALARA by means of: Automated exposure control; adjustment of the mA and/or kV according to patient size (this includes techniques or standardized protocols for targeted exams where dose is matched to indication/reason for exam; i.e. extremities or head); and/or use of iterative reconstructive technique.      Electronically signed by: Yaron Peck  Date:    01/05/2022  Time:    19:15             Narrative:    EXAMINATION:  CT ABDOMEN PELVIS WITHOUT CONTRAST    CLINICAL HISTORY:  Bowel obstruction suspected;    TECHNIQUE:  Low dose axial images, sagittal and coronal reformations were obtained from the lung bases to the pubic symphysis, 30 mL of oral Omnipaque 350 was administered..    COMPARISON:  None    FINDINGS:  Heart: Normal in size as far as  seen.  No pericardial effusion as far seen.    Lung Bases: 8 mm nodule left lung base.    Liver: Normal in size and attenuation, with no focal hepatic lesions.    Gallbladder: Status post cholecystectomy    Bile Ducts: No evidence of dilated ducts.    Pancreas: No mass or peripancreatic fat stranding.    Spleen: Unremarkable.    Adrenals: Unremarkable.    Kidneys/ Ureters: Unremarkable.    Bladder: No evidence of wall thickening.    Reproductive organs: Unremarkable.    GI Tract/Mesentery: No evidence of bowel obstruction or inflammation. No secondary signs of appendicitis.  Postsurgical changes in the pelvis.  The appendix was not definitively identified    Peritoneal Space: No ascites. No free air.    Retroperitoneum: No significant adenopathy.    Abdominal wall: Colonic loop of bowel herniates into the umbilicus with the superior inferior mouth which measures 6.4 cm just above the level of the umbilicus.  A tract is seen above the stomach into the anterior subcutaneous fat may relate to prior gastrostomy tube placement.    Vasculature: No aneurysm.    Bones: No acute fracture.  Lumbosacral spinal fixation with residual spondylolisthesis at L5-S1.                                      The Emergency Provider reviewed the vital signs and test results, which are outlined above.     ED Discussion     8:00 PM: Dr. Ahuja transfers care of patient to Dr. Myrick pending results.    10:00 PM: Reassessed pt at this time. Discussed with pt all pertinent ED information and results. Discussed pt dx and plan of tx. Gave pt all f/u and return to the ED instructions. All questions and concerns were addressed at this time. Pt expresses understanding of information and instructions, and is comfortable with plan to discharge. Pt is stable for discharge.    I discussed with patient and/or family/caretaker that evaluation in the ED does not suggest any emergent or life threatening medical conditions requiring immediate intervention  beyond what was provided in the ED, and I believe patient is safe for discharge.  Regardless, an unremarkable evaluation in the ED does not preclude the development or presence of a serious of life threatening condition. As such, patient was instructed to return immediately for any worsening or change in current symptoms.       Medical Decision Making:   Clinical Tests:   Lab Tests: Ordered and Reviewed  Radiological Study: Ordered and Reviewed           ED Medication(s):  Medications   ondansetron injection 4 mg (4 mg Intravenous Given 1/5/22 1832)   morphine injection 4 mg (4 mg Intravenous Given 1/5/22 1832)       Discharge Medication List as of 1/5/2022  9:48 PM      START taking these medications    Details   promethazine (PHENERGAN) 25 MG suppository Place 1 suppository (25 mg total) rectally every 6 (six) hours as needed for Nausea., Starting Wed 1/5/2022, Print              Follow-up Information     Akanksha West MD In 1 day.    Specialty: Internal Medicine  Contact information:  [address] [address]  [address]  [facility] [address] [phone]             [facility] - Emergency Dept..    Specialty: Emergency Medicine  Why: As needed, If symptoms worsen  Contact information:  [address]  [facility] [address] [phone]                           Scribe Attestation:   Scribe #1: I performed the above scribed service and the documentation accurately describes the services I performed. I attest to the accuracy of the note.     Attending:   Physician Attestation Statement for Scribe #1: I, New Ahuja Jr., MD, personally performed the services described in this documentation, as scribed by Dominic Spring, in my presence, and it is both accurate and complete.       Scribe Attestation:   Scribe #2: I performed the above scribed service and the documentation accurately describes the services I performed. I attest to the accuracy of the note.    Attending Attestation:            Physician Attestation for Scribe:    Physician Attestation Statement for Scribe #2: I, James Myrick MD, reviewed documentation, as scribed by Amparo Falcon in my presence, and it is both accurate and complete. I also acknowledge and confirm the content of the note done by Santosibe #1.           Clinical Impression       ICD-10-CM ICD-9-CM   1. Cannabinoid hyperemesis syndrome  R11.2 536.2    F12.90 305.20   2. Abdominal pain  R10.9 789.00       Disposition:   Disposition: Discharged  Condition: Stable         James Myrick MD  01/07/22 0049

## 2022-01-05 NOTE — FIRST PROVIDER EVALUATION
Medical screening exam completed.  I have conducted a focused provider triage encounter, findings are as follows:    Brief history of present illness:  Abdominal pain, emesis and constipation     There were no vitals filed for this visit.      Preliminary workup initiated; this workup will be continued and followed by the physician or advanced practice provider that is assigned to the patient when roomed.

## 2022-01-19 ENCOUNTER — PATIENT MESSAGE (OUTPATIENT)
Dept: GASTROENTEROLOGY | Facility: CLINIC | Age: 42
End: 2022-01-19
Payer: COMMERCIAL

## 2022-01-19 DIAGNOSIS — R10.9 ABDOMINAL PAIN, UNSPECIFIED ABDOMINAL LOCATION: Primary | ICD-10-CM

## 2022-02-17 ENCOUNTER — PATIENT MESSAGE (OUTPATIENT)
Dept: GASTROENTEROLOGY | Facility: CLINIC | Age: 42
End: 2022-02-17
Payer: COMMERCIAL

## 2022-06-15 ENCOUNTER — HOSPITAL ENCOUNTER (EMERGENCY)
Facility: HOSPITAL | Age: 42
Discharge: HOME | End: 2022-06-15
Attending: EMERGENCY MEDICINE
Payer: COMMERCIAL

## 2022-06-15 VITALS
DIASTOLIC BLOOD PRESSURE: 56 MMHG | SYSTOLIC BLOOD PRESSURE: 104 MMHG | OXYGEN SATURATION: 98 % | HEART RATE: 80 BPM | TEMPERATURE: 99 F | RESPIRATION RATE: 20 BRPM

## 2022-06-15 DIAGNOSIS — R55 NEAR SYNCOPE: ICD-10-CM

## 2022-06-15 DIAGNOSIS — S82.401A UNSPECIFIED FRACTURE OF SHAFT OF RIGHT FIBULA, INITIAL ENCOUNTER FOR CLOSED FRACTURE: Primary | ICD-10-CM

## 2022-06-15 LAB
ALBUMIN SERPL BCP-MCNC: 3.6 G/DL (ref 3.5–5.2)
ALP SERPL-CCNC: 45 U/L (ref 55–135)
ALT SERPL W/O P-5'-P-CCNC: 8 U/L (ref 10–44)
ANION GAP SERPL CALC-SCNC: 9 MMOL/L (ref 8–16)
AST SERPL-CCNC: 12 U/L (ref 10–40)
BASOPHILS # BLD AUTO: 0.03 K/UL (ref 0–0.2)
BASOPHILS NFR BLD: 0.4 % (ref 0–1.9)
BILIRUB SERPL-MCNC: 0.3 MG/DL (ref 0.1–1)
BUN SERPL-MCNC: 17 MG/DL (ref 6–20)
CALCIUM SERPL-MCNC: 9 MG/DL (ref 8.7–10.5)
CHLORIDE SERPL-SCNC: 102 MMOL/L (ref 95–110)
CK SERPL-CCNC: 98 U/L (ref 20–180)
CO2 SERPL-SCNC: 29 MMOL/L (ref 23–29)
CREAT SERPL-MCNC: 0.8 MG/DL (ref 0.5–1.4)
DIFFERENTIAL METHOD: ABNORMAL
EOSINOPHIL # BLD AUTO: 0.1 K/UL (ref 0–0.5)
EOSINOPHIL NFR BLD: 1 % (ref 0–8)
ERYTHROCYTE [DISTWIDTH] IN BLOOD BY AUTOMATED COUNT: 12.6 % (ref 11.5–14.5)
EST. GFR  (AFRICAN AMERICAN): >60 ML/MIN/1.73 M^2
EST. GFR  (NON AFRICAN AMERICAN): >60 ML/MIN/1.73 M^2
GLUCOSE SERPL-MCNC: 77 MG/DL (ref 70–110)
HCT VFR BLD AUTO: 39.8 % (ref 37–48.5)
HCV AB SERPL QL IA: NEGATIVE
HEP C VIRUS HOLD SPECIMEN: NORMAL
HGB BLD-MCNC: 13.2 G/DL (ref 12–16)
HIV 1+2 AB+HIV1 P24 AG SERPL QL IA: NEGATIVE
IMM GRANULOCYTES # BLD AUTO: 0.02 K/UL (ref 0–0.04)
IMM GRANULOCYTES NFR BLD AUTO: 0.3 % (ref 0–0.5)
LYMPHOCYTES # BLD AUTO: 2.1 K/UL (ref 1–4.8)
LYMPHOCYTES NFR BLD: 28.5 % (ref 18–48)
MCH RBC QN AUTO: 29.5 PG (ref 27–31)
MCHC RBC AUTO-ENTMCNC: 33.2 G/DL (ref 32–36)
MCV RBC AUTO: 89 FL (ref 82–98)
MONOCYTES # BLD AUTO: 0.8 K/UL (ref 0.3–1)
MONOCYTES NFR BLD: 11.4 % (ref 4–15)
NEUTROPHILS # BLD AUTO: 4.2 K/UL (ref 1.8–7.7)
NEUTROPHILS NFR BLD: 58.4 % (ref 38–73)
NRBC BLD-RTO: 0 /100 WBC
PLATELET # BLD AUTO: 148 K/UL (ref 150–450)
PMV BLD AUTO: 12 FL (ref 9.2–12.9)
POTASSIUM SERPL-SCNC: 3.5 MMOL/L (ref 3.5–5.1)
PROT SERPL-MCNC: 6.4 G/DL (ref 6–8.4)
RBC # BLD AUTO: 4.48 M/UL (ref 4–5.4)
SODIUM SERPL-SCNC: 140 MMOL/L (ref 136–145)
TROPONIN I SERPL DL<=0.01 NG/ML-MCNC: <0.006 NG/ML (ref 0–0.03)
WBC # BLD AUTO: 7.22 K/UL (ref 3.9–12.7)

## 2022-06-15 PROCEDURE — 85025 COMPLETE CBC W/AUTO DIFF WBC: CPT | Performed by: NURSE PRACTITIONER

## 2022-06-15 PROCEDURE — 25000003 PHARM REV CODE 250: Performed by: NURSE PRACTITIONER

## 2022-06-15 PROCEDURE — 96374 THER/PROPH/DIAG INJ IV PUSH: CPT

## 2022-06-15 PROCEDURE — 96361 HYDRATE IV INFUSION ADD-ON: CPT

## 2022-06-15 PROCEDURE — 93010 EKG 12-LEAD: ICD-10-PCS | Mod: ,,, | Performed by: INTERNAL MEDICINE

## 2022-06-15 PROCEDURE — 93010 ELECTROCARDIOGRAM REPORT: CPT | Mod: ,,, | Performed by: INTERNAL MEDICINE

## 2022-06-15 PROCEDURE — 99284 EMERGENCY DEPT VISIT MOD MDM: CPT | Mod: 25

## 2022-06-15 PROCEDURE — 63600175 PHARM REV CODE 636 W HCPCS: Performed by: EMERGENCY MEDICINE

## 2022-06-15 PROCEDURE — 96375 TX/PRO/DX INJ NEW DRUG ADDON: CPT

## 2022-06-15 PROCEDURE — 84484 ASSAY OF TROPONIN QUANT: CPT | Performed by: NURSE PRACTITIONER

## 2022-06-15 PROCEDURE — 86803 HEPATITIS C AB TEST: CPT | Performed by: EMERGENCY MEDICINE

## 2022-06-15 PROCEDURE — 80053 COMPREHEN METABOLIC PANEL: CPT | Performed by: NURSE PRACTITIONER

## 2022-06-15 PROCEDURE — 82550 ASSAY OF CK (CPK): CPT | Performed by: NURSE PRACTITIONER

## 2022-06-15 PROCEDURE — 93005 ELECTROCARDIOGRAM TRACING: CPT

## 2022-06-15 PROCEDURE — 87389 HIV-1 AG W/HIV-1&-2 AB AG IA: CPT | Performed by: EMERGENCY MEDICINE

## 2022-06-15 RX ORDER — ONDANSETRON 2 MG/ML
4 INJECTION INTRAMUSCULAR; INTRAVENOUS
Status: COMPLETED | OUTPATIENT
Start: 2022-06-15 | End: 2022-06-15

## 2022-06-15 RX ORDER — HYDROCODONE BITARTRATE AND ACETAMINOPHEN 5; 325 MG/1; MG/1
1 TABLET ORAL EVERY 4 HOURS PRN
Qty: 11 TABLET | Refills: 0 | Status: SHIPPED | OUTPATIENT
Start: 2022-06-15

## 2022-06-15 RX ORDER — KETOROLAC TROMETHAMINE 30 MG/ML
15 INJECTION, SOLUTION INTRAMUSCULAR; INTRAVENOUS
Status: COMPLETED | OUTPATIENT
Start: 2022-06-15 | End: 2022-06-15

## 2022-06-15 RX ORDER — SODIUM CHLORIDE 9 MG/ML
1000 INJECTION, SOLUTION INTRAVENOUS
Status: COMPLETED | OUTPATIENT
Start: 2022-06-15 | End: 2022-06-15

## 2022-06-15 RX ORDER — MORPHINE SULFATE 4 MG/ML
4 INJECTION, SOLUTION INTRAMUSCULAR; INTRAVENOUS
Status: COMPLETED | OUTPATIENT
Start: 2022-06-15 | End: 2022-06-15

## 2022-06-15 RX ADMIN — SODIUM CHLORIDE, SODIUM LACTATE, POTASSIUM CHLORIDE, AND CALCIUM CHLORIDE 500 ML: .6; .31; .03; .02 INJECTION, SOLUTION INTRAVENOUS at 01:06

## 2022-06-15 RX ADMIN — SODIUM CHLORIDE 1000 ML: 0.9 INJECTION, SOLUTION INTRAVENOUS at 11:06

## 2022-06-15 RX ADMIN — KETOROLAC TROMETHAMINE 15 MG: 30 INJECTION, SOLUTION INTRAMUSCULAR at 01:06

## 2022-06-15 RX ADMIN — ONDANSETRON 4 MG: 2 INJECTION INTRAMUSCULAR; INTRAVENOUS at 12:06

## 2022-06-15 RX ADMIN — MORPHINE SULFATE 4 MG: 4 INJECTION INTRAVENOUS at 12:06

## 2022-06-15 NOTE — FIRST PROVIDER EVALUATION
Medical screening exam completed.  I have conducted a focused provider triage encounter, findings are as follows:    Brief history of present illness:  42-year-old female with complaint of near syncopal episode on 2 occasions since last night.  Patient fell last night and fractured right ankle.  Patient evaluated at urgent care.  Patient sent to ER for further evaluation.    There were no vitals filed for this visit.    Pertinent physical exam:  AAOX3, BBSCTA

## 2022-06-15 NOTE — ED PROVIDER NOTES
SCRIBE #1 NOTE: I, Napoleon Talbot, am scribing for, and in the presence of, Kody Eaton MD. I have scribed the entire note.       History      Chief Complaint   Patient presents with    Near Syncope     Pt had near syncopal episode after standing last night and [place] [facility] [place] said pt broke R. Fibula and placed a boot on pt. Pt sent to ED to assess BP       Review of patient's allergies indicates:   Allergen Reactions    Sulfa (sulfonamide antibiotics) Rash    Reglan [metoclopramide] Other (See Comments)     Tardive dyskinesia        HPI   HPI    6/15/2022, 11:44 AM   History obtained from the patient      History of Present Illness: Lia Avila is a 42 y.o. female patient with a PMHX of gastroparesis who presents to the Emergency Department for evaluation following a near-syncopal episode last night. Pt states that she had a near-syncopal episode after using the bathroom last night, causing her to fall to the ground. Pt presented to [facility] earlier this morning for R ankle pain following the fall, and X-ray there showed R fibula fracture. Pt also notes that her BP at home was 80/60. Symptoms are constant and moderate in severity. No mitigating or exacerbating factors reported. Associated sxs include R ankle pain. Pt also reports chronic nausea from her gastroparesis. Patient denies any fever, chills, SOB, CP, weakness, numbness, dizziness, headache, and all other sxs at this time. Pt notes regular marijuana use. No further complaints or concerns at this time.     Arrival mode: Personal vehicle    PCP: Akanskha West MD       Past Medical History:  Past Medical History:   Diagnosis Date    Acid reflux     Aerophagia     Alcoholic     recovering    Anxiety     Depression     Functional gastrointestinal disorder     Hyperlipidemia     IBS (irritable bowel syndrome)     Irritable bowel syndrome     Sleep apnea     Trivial aortic valve anomaly        Past Surgical History:  Past  Surgical History:   Procedure Laterality Date    BACK SURGERY      fusion of L4, L5, and S1    CHOLECYSTECTOMY      COLON SURGERY      COLONOSCOPY      COLONOSCOPY N/A 2018    Procedure: Colonoscopy;  Surgeon: Apurva Salinas MD;  [facility]  Service: Endoscopy;  Laterality: N/A;  5 days of full liquids then 24 hours clear liquids    ESOPHAGEAL MANOMETRY WITH MEASUREMENT OF IMPEDANCE N/A 10/8/2018    Procedure: MANOMETRY, ESOPHAGUS, WITH IMPEDANCE MEASUREMENT;  Surgeon: Apurva Salinas MD;  Location: [facility] ([address] [place] [address]);  Service: Endoscopy;  Laterality: N/A;    GASTROSTOMY-JEJEUNOSTOMY TUBE CHANGE/PLACEMENT      SMALL INTESTINE SURGERY      UPPER GASTROINTESTINAL ENDOSCOPY           Family History:  Family History   Problem Relation Age of Onset    Hypertension Mother     Cancer Father 49        stomach CA    Stomach cancer Father 49    Liver cancer Father     Rectal cancer Maternal Grandmother         dx in 70s    Celiac disease Neg Hx     Cirrhosis Neg Hx     Colon cancer Neg Hx     Colon polyps Neg Hx     Crohn's disease Neg Hx     Cystic fibrosis Neg Hx     Esophageal cancer Neg Hx     Hemochromatosis Neg Hx     Inflammatory bowel disease Neg Hx     Irritable bowel syndrome Neg Hx     Liver disease Neg Hx     Ulcerative colitis Neg Hx     Dain's disease Neg Hx     Lymphoma Neg Hx     Tuberculosis Neg Hx     Scleroderma Neg Hx     Rheum arthritis Neg Hx     Multiple sclerosis Neg Hx     Melanoma Neg Hx     Lupus Neg Hx     Psoriasis Neg Hx     Skin cancer Neg Hx        Social History:  Social History     Tobacco Use    Smoking status: Smoker, Current Status Unknown     Packs/day: 0.25     Types: Cigarettes     Last attempt to quit: 12/3/2016     Years since quittin.5    Smokeless tobacco: Never Used   Substance and Sexual Activity    Alcohol use: No     Comment: pt states that she is a recoveirng alcoholic, last drink 11    Drug use:  Not Currently     Types: Marijuana    Sexual activity: Not on file       ROS   Review of Systems   Constitutional: Negative for chills and fever.   HENT: Negative for sore throat.    Respiratory: Negative for shortness of breath.    Cardiovascular: Negative for chest pain.   Gastrointestinal: Positive for nausea (chronic due to gastroparesis). Negative for diarrhea.   Genitourinary: Negative for dysuria.   Musculoskeletal: Positive for arthralgias (R ankle). Negative for back pain.   Skin: Negative for rash.   Neurological: Negative for dizziness, weakness, numbness and headaches.        (+) near-syncope   Hematological: Does not bruise/bleed easily.   All other systems reviewed and are negative.    Physical Exam      Initial Vitals [06/15/22 0944]   BP Pulse Resp Temp SpO2   117/60 87 18 98.8 °F (37.1 °C) 98 %      MAP       --          Physical Exam  Nursing Notes and Vital Signs Reviewed.  Constitutional: Patient is in no acute distress. Well-developed and well-nourished.  Head: Atraumatic. Normocephalic.  Eyes: PERRL. EOM intact. Conjunctivae are not pale. No scleral icterus.  ENT: Mucous membranes are moist. Oropharynx is clear and symmetric.    Neck: Supple. Full ROM.   Cardiovascular: Regular rate. Regular rhythm. No murmurs, rubs, or gallops. Distal pulses are 2+ and symmetric.  Pulmonary/Chest: No respiratory distress. Clear to auscultation bilaterally. No wheezing or rales.  Abdominal: Soft and non-distended.  There is no tenderness.  No rebound, guarding, or rigidity.   Musculoskeletal: Moves all extremities. No obvious deformities.  Right Ankle:  No obvious deformity. Bruising to the R lateral lower leg. Edema to the R malleolar region. Intact sensation to light touch. Distal capillary refill takes less than 2 seconds.  PT and DP pulses are 2+ bilaterally.  Left Ankle:  No obvious deformity. There is no swelling.  There is no tenderness. Ankle dorsiflexion and ankle plantar flexion are intact.  Intact  sensation to light touch. Distal capillary refill takes less than 2 seconds.  PT and DP pulses are 2+ bilaterally.  Skin: Warm and dry.  Neurological:  Alert, awake, and appropriate.  Normal speech.  No acute focal neurological deficits are appreciated.  Psychiatric: Normal affect. Good eye contact. Appropriate in content.    ED Course    Procedures  ED Vital Signs:  Vitals:    06/15/22 0944 06/15/22 1120 06/15/22 1127 06/15/22 1128   BP: 117/60  (!) 107/53 (!) 106/53   Pulse: 87 70 67 72   Resp: 18      Temp: 98.8 °F (37.1 °C)      TempSrc: Oral      SpO2: 98%  99% 100%    06/15/22 1129 06/15/22 1203 06/15/22 1415   BP: (!) 100/51  (!) 104/56   Pulse: 82  80   Resp:  18 20   Temp:      TempSrc:      SpO2: 100%  98%       Abnormal Lab Results:  Labs Reviewed   CBC W/ AUTO DIFFERENTIAL - Abnormal; Notable for the following components:       Result Value    Platelets 148 (*)     All other components within normal limits    Narrative:     Release to patient->Immediate   COMPREHENSIVE METABOLIC PANEL - Abnormal; Notable for the following components:    Alkaline Phosphatase 45 (*)     ALT 8 (*)     All other components within normal limits    Narrative:     Release to patient->Immediate   HIV 1 / 2 ANTIBODY    Narrative:     Release to patient->Immediate   HEPATITIS C ANTIBODY    Narrative:     Release to patient->Immediate   HEP C VIRUS HOLD SPECIMEN    Narrative:     Release to patient->Immediate   CK    Narrative:     Release to patient->Immediate   TROPONIN I    Narrative:     Release to patient->Immediate        All Lab Results:  Results for orders placed or performed during the hospital encounter of 06/15/22   HIV 1/2 Ag/Ab (4th Gen)   Result Value Ref Range    HIV 1/2 Ag/Ab Negative Negative   Hepatitis C Antibody   Result Value Ref Range    Hepatitis C Ab Negative Negative   HCV Virus Hold Specimen   Result Value Ref Range    HEP C Virus Hold Specimen Hold for HCV sendout    CBC auto differential   Result Value  Ref Range    WBC 7.22 3.90 - 12.70 K/uL    RBC 4.48 4.00 - 5.40 M/uL    Hemoglobin 13.2 12.0 - 16.0 g/dL    Hematocrit 39.8 37.0 - 48.5 %    MCV 89 82 - 98 fL    MCH 29.5 27.0 - 31.0 pg    MCHC 33.2 32.0 - 36.0 g/dL    RDW 12.6 11.5 - 14.5 %    Platelets 148 (L) 150 - 450 K/uL    MPV 12.0 9.2 - 12.9 fL    Immature Granulocytes 0.3 0.0 - 0.5 %    Gran # (ANC) 4.2 1.8 - 7.7 K/uL    Immature Grans (Abs) 0.02 0.00 - 0.04 K/uL    Lymph # 2.1 1.0 - 4.8 K/uL    Mono # 0.8 0.3 - 1.0 K/uL    Eos # 0.1 0.0 - 0.5 K/uL    Baso # 0.03 0.00 - 0.20 K/uL    nRBC 0 0 /100 WBC    Gran % 58.4 38.0 - 73.0 %    Lymph % 28.5 18.0 - 48.0 %    Mono % 11.4 4.0 - 15.0 %    Eosinophil % 1.0 0.0 - 8.0 %    Basophil % 0.4 0.0 - 1.9 %    Differential Method Automated    Comprehensive metabolic panel   Result Value Ref Range    Sodium 140 136 - 145 mmol/L    Potassium 3.5 3.5 - 5.1 mmol/L    Chloride 102 95 - 110 mmol/L    CO2 29 23 - 29 mmol/L    Glucose 77 70 - 110 mg/dL    BUN 17 6 - 20 mg/dL    Creatinine 0.8 0.5 - 1.4 mg/dL    Calcium 9.0 8.7 - 10.5 mg/dL    Total Protein 6.4 6.0 - 8.4 g/dL    Albumin 3.6 3.5 - 5.2 g/dL    Total Bilirubin 0.3 0.1 - 1.0 mg/dL    Alkaline Phosphatase 45 (L) 55 - 135 U/L    AST 12 10 - 40 U/L    ALT 8 (L) 10 - 44 U/L    Anion Gap 9 8 - 16 mmol/L    eGFR if African American >60 >60 mL/min/1.73 m^2    eGFR if non African American >60 >60 mL/min/1.73 m^2   CPK   Result Value Ref Range    CPK 98 20 - 180 U/L   Troponin I   Result Value Ref Range    Troponin I <0.006 0.000 - 0.026 ng/mL     Imaging Results:  Imaging Results    None        The EKG was ordered, reviewed, and independently interpreted by the ED provider.  Interpretation time: 09:38  Rate: 83 BPM  Rhythm: normal sinus rhythm  Interpretation: No acute ST changes. No STEMI.           The Emergency Provider reviewed the vital signs and test results, which are outlined above.    ED Discussion     1:39 PM: Discussed pt's case with Dr. Schreiber (Orthopedic  Surgery) who recommends having the pt keep her walking boot on, bear weight as tolerated, and outpatient follow up with Ortho trauma clinic next week.    2:09 PM: Reassessed pt at this time. Discussed with pt all pertinent ED information and results. Discussed pt dx and plan of tx. Gave pt all f/u and return to the ED instructions. All questions and concerns were addressed at this time. Pt expresses understanding of information and instructions, and is comfortable with plan to discharge. Pt is stable for discharge.    Patient presents with a syncopal or near-syncopal episode. I have no suspicion that the event is secondary to an arrhythmia such as WPW, prolonged QT syndrome, or ventricular tachycardia. I have no suspicion for a seizure episode, intracranial hemorrhage, pulmonary embolus, myocardial infarction, sepsis, ectopic pregnancy, hemorrhagic shock, or hypoglycemia. Based on my evaluation, there is no emergent medical condition. Syncope precautions were discussed with the patient and/or caretaker, specifically not to swim or bathe unattended, not to operate motor vehicles or other machinery, and to avoid heights or other areas where falls may occur until cleared by primary care physician. Patient is safe for discharge.    I discussed with patient and/or family/caretaker that evaluation in the ED does not suggest any emergent or life threatening medical conditions requiring immediate intervention beyond what was provided in the ED, and I believe patient is safe for discharge.  Regardless, an unremarkable evaluation in the ED does not preclude the development or presence of a serious of life threatening condition. As such, patient was instructed to return immediately for any worsening or change in current symptoms.      ED Course as of 06/15/22 1755   Wed Shawn 15, 2022   1316 Reviewed XR from urgent care. No reduction necessary.  [BA]   1428 Declined urine, says she is on her period. Would like to go home, does  not want pregnancy test.  [BA]      ED Course User Index  [BA] Kody Eaton MD       ED Medication(s):  Medications   0.9%  NaCl infusion (0 mLs Intravenous Stopped 6/15/22 1327)   morphine injection 4 mg (4 mg Intravenous Given 6/15/22 1203)   ondansetron injection 4 mg (4 mg Intravenous Given 6/15/22 1203)   lactated ringers bolus 500 mL (0 mLs Intravenous Stopped 6/15/22 1428)   ketorolac injection 15 mg (15 mg Intravenous Given 6/15/22 1323)        Follow-up Information     [facility] Schedule an appointment as soon as possible for a visit in 2 days.    Why: For re-evaluation and further treatment  Contact information:  [id number] [place] [address] [address]  [facility] [address] [phone]             [facility].    Specialty: Emergency Medicine  Why: If symptoms worsen, For re-evaluation and further treatment, As needed  Contact information:  [address]  [facility] [address] [phone]           Akanksha West MD. Schedule an appointment as soon as possible for a visit in 2 days.    Specialty: Internal Medicine  Why: For re-evaluation and further treatment  Contact information:  [address]  [address]  [facility] [address] [phone]                           Medical Decision Making    Medical Decision Making:   Clinical Tests:   Lab Tests: Ordered and Reviewed  Radiological Study: Reviewed  Medical Tests: Ordered and Reviewed           Scribe Attestation:   Scribe #1: I performed the above scribed service and the documentation accurately describes the services I performed. I attest to the accuracy of the note.    Attending:   Physician Attestation Statement for Scribe #1: I, Kody Eaton MD, personally performed the services described in this documentation, as scribed by Napoleon Talbot, in my presence, and it is both accurate and complete.          Clinical Impression       ICD-10-CM ICD-9-CM   1. Unspecified fracture of shaft of  right fibula, initial encounter for closed fracture  S82.401A 823.21   2. Near syncope  R55 780.2       Disposition:   Disposition: Discharged  Condition: Stable         Kody Eaton MD  06/15/22 9171

## 2022-07-27 NOTE — TELEPHONE ENCOUNTER
Reason for Disposition   History of prior 'blood clot' in leg or lungs (i.e., deep vein thrombosis, pulmonary embolism)    Protocols used: ST CHEST PAIN-A-OH    Patient is c/o chest pain and pressure.  Patient rates pain 5/10.  Patient has a history of blood clot in neck and ar. And is on blood thinner.  Advised patient to go to ER for further evaluation based on her prior history.   Advancement-Rotation Flap Text: The defect edges were debeveled with a #15 scalpel blade.  Given the location of the defect, shape of the defect and the proximity to free margins an advancement-rotation flap was deemed most appropriate.  Using a sterile surgical marker, an appropriate flap was drawn incorporating the defect and placing the expected incisions within the relaxed skin tension lines where possible. The area thus outlined was incised deep to adipose tissue with a #15 scalpel blade.  The skin margins were undermined to an appropriate distance in all directions utilizing iris scissors.

## 2024-02-05 NOTE — MR AVS SNAPSHOT
[facility] - Pulmonary Services  [address] [facility] [place] [address]  Phone: [phone]  Fax: [phone]                  Lia Avila   2/3/2017 2:40 PM   Office Visit    Description:  Female : 1980   Provider:  Thony Ivy MD   [facility]  [facility] - Pulmonary Services           Reason for Visit     hospital f/u           Diagnoses this Visit        Comments    Pleuritic chest pain    -  Primary     Parapneumonic effusion         Thrombosis of left subclavian vein         Other irritable bowel syndrome         Malnutrition of moderate degree         S/P small bowel resection         Restrictive ventilatory defect                To Do List           Future Appointments        Provider Department Dept Phone    2017 10:40 AM Louis O. Jeansonne IV, MD [facility] - General Surgery [phone]      Goals (5 Years of Data)     None      Follow-Up and Disposition     Return in about 6 weeks (around 3/17/2017) for prevnar and flu vac, cxr, pft.      [facility]     [facility] On Call Nurse Care Line -  Assistance  Registered nurses in the [facility] On Call Center provide clinical advisement, health education, appointment booking, and other advisory services.  Call for this free service at [phone].             Medications           Message regarding Medications     Verify the changes and/or additions to your medication regime listed below are the same as discussed with your clinician today.  If any of these changes or additions are incorrect, please notify your healthcare provider.        STOP taking these medications     DEXTROAMPHETAMINE/AMPHETAMINE (ADDERALL ORAL) Take 20 mg by mouth 2 (two) times daily.     imipramine (TOFRANIL) 50 MG tablet Take 100 mg by mouth every evening.     oxycodone (ROXICODONE) 10 mg Tab immediate release tablet Take 1 tablet (10 mg total) by mouth every 6 (six) hours as needed for Pain.    promethazine (PHENERGAN) 25 MG tablet Take 1 tablet (25 mg  "total) by mouth every 6 (six) hours as needed for Nausea.           Verify that the below list of medications is an accurate representation of the medications you are currently taking.  If none reported, the list may be blank. If incorrect, please contact your healthcare provider. Carry this list with you in case of emergency.           Current Medications     apixaban (ELIQUIS) 5 mg Tab Take 5 mg by mouth 2 (two) times daily.    apixaban 5 mg Tab Take 1 tablet (5 mg total) by mouth 2 (two) times daily.    atorvastatin (LIPITOR) 20 MG tablet     duloxetine (CYMBALTA) 60 MG capsule Take 90 mg by mouth once daily.    lorazepam (ATIVAN) 0.5 MG tablet Take 0.5 mg by mouth every 6 (six) hours as needed.    multivitamin (THERAGRAN) tablet Take 1 tablet by mouth once daily.    omeprazole (PRILOSEC) 20 MG capsule Take 20 mg by mouth once daily.    ondansetron (ZOFRAN) 4 MG tablet Take 8 mg by mouth every 8 (eight) hours.    quetiapine (SEROQUEL) 100 MG Tab Take 300 mg by mouth every evening.     trazodone (DESYREL) 150 MG tablet Take 150 mg by mouth every evening.           Clinical Reference Information           Your Vitals Were     BP Pulse Resp Height Weight Last Period    118/78 116 18 5' 2" (1.575 m) 62.6 kg (138 lb) 12/22/2016 (Approximate)    SpO2 BMI             98% 25.24 kg/m2         Blood Pressure          Most Recent Value    BP  118/78      Allergies as of 2/3/2017     Sulfa (Sulfonamide Antibiotics)      Immunizations Administered on Date of Encounter - 2/3/2017     Name Date Dose VIS Date Route    Pneumococcal Conjugate - 13 Valent  Incomplete 0.5 mL 11/5/2015 Intramuscular      Orders Placed During Today's Visit      Normal Orders This Visit    Pneumococcal conjugate vaccine 13-valent less than 6yo IM     Future Labs/Procedures Expected by Expires    X-Ray Chest PA And Lateral  2/3/2017 2/3/2018    Complete PFT w/ bronchodilator  As directed 2/3/2018      [facility] Sign-Up     Activating your [facility] " account is as easy as 1-2-3!     1) Visit [email or web address], select Sign Up Now, enter this activation code and your date of birth, then select Next.  [email or web address]  Expires: 3/8/2017  1:56 PM      2) Create a username and password to use when you visit [facility] in the future and select a security question in case you lose your password and select Next.    3) Enter your e-mail address and click Sign Up!    Additional Information  If you have questions, please e-mail [email or web address] or call [phone] to talk to our [facility] staff. Remember, [facility] is NOT to be used for urgent needs. For medical emergencies, dial 911.         Instructions      Duration of anticoagulation -- In patients with primary upper extremity deep vein thrombosis, anticoagulation should be continued for a minimum of three months following the initial thrombotic event, with a longer duration of therapy indicated for those who have had a recurrent event [63,110]. We maintain anticoagulation regardless of whether intervention (thrombolysis, thoracic outlet decompression) was performed             Language Assistance Services     ATTENTION: Language assistance services are available, free of charge. Please call [phone].      ATENCIÓN: Si habla tyrell, tiene a joseph disposición servicios gratuitos de asistencia lingüística. Llame al [phone].     [facility] Ý: N?u b?n nói Ti?ng Vi?t, có các d?ch v? h? tr? ngôn ng? mi?n phí dành cho b?n. G?i s? [phone].         [facility] - [facility]monary Services complies with applicable Federal civil rights laws and does not discriminate on the basis of race, color, national origin, age, disability, or sex.         Speaking Coherently

## 2024-05-23 ENCOUNTER — TELEPHONE (OUTPATIENT)
Dept: PSYCHIATRY | Facility: CLINIC | Age: 44
End: 2024-05-23
Payer: COMMERCIAL

## 2024-05-23 NOTE — TELEPHONE ENCOUNTER
----- Message from Dorota Daley sent at 5/23/2024  7:58 AM CDT -----  Contact: Vianey/KRISSY Winters is calling to check on the status of a referral that was sent on 5/15/24, please call her at [phone] ext [phone] she stated that the patient needs to be seen as soon as possible.    Thanks  Td

## 2024-06-24 ENCOUNTER — TELEPHONE (OUTPATIENT)
Dept: PSYCHIATRY | Facility: CLINIC | Age: 44
End: 2024-06-24
Payer: COMMERCIAL

## 2024-06-24 NOTE — TELEPHONE ENCOUNTER
----- Message from Jeanette Hannon sent at 6/24/2024 10:16 AM CDT -----  States she would like to schedule an New Patient Appt. Please call pt [phone]. Thank you   [facility]: Preventive Care And Screening: Influenza Immunization: Influenza Immunization previously received during influenza season Detail Level: Detailed